# Patient Record
Sex: FEMALE | Race: WHITE | Employment: OTHER | URBAN - METROPOLITAN AREA
[De-identification: names, ages, dates, MRNs, and addresses within clinical notes are randomized per-mention and may not be internally consistent; named-entity substitution may affect disease eponyms.]

---

## 2017-01-23 ENCOUNTER — OFFICE VISIT (OUTPATIENT)
Dept: OTOLARYNGOLOGY | Facility: CLINIC | Age: 77
End: 2017-01-23

## 2017-01-23 VITALS
BODY MASS INDEX: 36.88 KG/M2 | SYSTOLIC BLOOD PRESSURE: 130 MMHG | TEMPERATURE: 98 F | WEIGHT: 235 LBS | HEIGHT: 67 IN | DIASTOLIC BLOOD PRESSURE: 80 MMHG

## 2017-01-23 DIAGNOSIS — K11.7 XEROSTOMIA: Primary | ICD-10-CM

## 2017-01-23 PROCEDURE — G0463 HOSPITAL OUTPT CLINIC VISIT: HCPCS | Performed by: OTOLARYNGOLOGY

## 2017-01-23 PROCEDURE — 99203 OFFICE O/P NEW LOW 30 MIN: CPT | Performed by: OTOLARYNGOLOGY

## 2017-01-23 RX ORDER — LEVOTHYROXINE SODIUM 88 UG/1
TABLET ORAL
Refills: 3 | COMMUNITY
Start: 2016-12-16 | End: 2017-04-03 | Stop reason: DRUGHIGH

## 2017-01-23 RX ORDER — HYDROCODONE BITARTRATE AND ACETAMINOPHEN 10; 325 MG/1; MG/1
TABLET ORAL
Refills: 0 | Status: ON HOLD | COMMUNITY
Start: 2017-01-17 | End: 2018-01-24

## 2017-01-24 NOTE — PROGRESS NOTES
Dawood Price is a 68year old female. Patient presents with:  Mouth/Lip Problem: c/o dry mouth for 3 months    HPI:   She's been experiencing problems with very dry mouth for the last several months.  She has no pain and reports no change in her medicat 0.0 oz/week       0 Standard drinks or equivalent per week       Comment: occ         REVIEW OF SYSTEMS:   GENERAL HEALTH: feels well otherwise  GENERAL : denies fever, chills, sweats, weight loss, weight gain  SKIN: denies any unusual skin lesions directly at this point.  - SJOGREN'S AB, ANTI-SS-A/-SS-B [7832] [Q]      The patient indicates understanding of these issues and agrees to the plan. Vitaly Sandhu MD  1/24/2017  8:15 AM

## 2017-02-03 ENCOUNTER — PRIOR ORIGINAL RECORDS (OUTPATIENT)
Dept: OTHER | Age: 77
End: 2017-02-03

## 2017-03-06 ENCOUNTER — PRIOR ORIGINAL RECORDS (OUTPATIENT)
Dept: OTHER | Age: 77
End: 2017-03-06

## 2017-03-06 ENCOUNTER — LAB ENCOUNTER (OUTPATIENT)
Dept: LAB | Age: 77
End: 2017-03-06
Attending: INTERNAL MEDICINE
Payer: MEDICARE

## 2017-03-06 DIAGNOSIS — F01.50 VASCULAR DEMENTIA WITH DEPRESSED MOOD (HCC): ICD-10-CM

## 2017-03-06 DIAGNOSIS — E83.52 HYPERCALCEMIA: ICD-10-CM

## 2017-03-06 DIAGNOSIS — E78.5 HYPERLIPEMIA: ICD-10-CM

## 2017-03-06 DIAGNOSIS — F32.A VASCULAR DEMENTIA WITH DEPRESSED MOOD (HCC): ICD-10-CM

## 2017-03-06 DIAGNOSIS — E78.00 PURE HYPERCHOLESTEROLEMIA: ICD-10-CM

## 2017-03-06 DIAGNOSIS — N18.30 CHRONIC KIDNEY DISEASE, STAGE III (MODERATE) (HCC): Primary | ICD-10-CM

## 2017-03-06 DIAGNOSIS — K11.7 DISTURBANCE OF SALIVARY SECRETION: ICD-10-CM

## 2017-03-06 LAB
ALBUMIN SERPL-MCNC: 3.7 G/DL (ref 3.5–4.8)
ALT SERPL-CCNC: 11 U/L (ref 14–54)
AST SERPL-CCNC: 13 U/L (ref 15–41)
BUN BLD-MCNC: 20 MG/DL (ref 8–20)
CALCIUM BLD-MCNC: 10.3 MG/DL (ref 8.3–10.3)
CHLORIDE: 109 MMOL/L (ref 101–111)
CHOLEST SMN-MCNC: 152 MG/DL (ref ?–200)
CO2: 27 MMOL/L (ref 22–32)
CREAT BLD-MCNC: 1.96 MG/DL (ref 0.55–1.02)
GLUCOSE BLD-MCNC: 83 MG/DL (ref 70–99)
HDLC SERPL-MCNC: 77 MG/DL (ref 45–?)
HDLC SERPL: 1.97 {RATIO} (ref ?–4.44)
LDLC SERPL CALC-MCNC: 52 MG/DL (ref ?–130)
NONHDLC SERPL-MCNC: 75 MG/DL (ref ?–130)
PHOSPHATE SERPL-MCNC: 3.6 MG/DL (ref 2.5–4.9)
POTASSIUM SERPL-SCNC: 5 MMOL/L (ref 3.6–5.1)
SODIUM SERPL-SCNC: 143 MMOL/L (ref 136–144)
TRIGLYCERIDES: 117 MG/DL (ref ?–150)
VLDL: 23 MG/DL (ref 5–40)

## 2017-03-06 PROCEDURE — 84450 TRANSFERASE (AST) (SGOT): CPT

## 2017-03-06 PROCEDURE — 86235 NUCLEAR ANTIGEN ANTIBODY: CPT

## 2017-03-06 PROCEDURE — 80069 RENAL FUNCTION PANEL: CPT

## 2017-03-06 PROCEDURE — 80061 LIPID PANEL: CPT

## 2017-03-06 PROCEDURE — 84460 ALANINE AMINO (ALT) (SGPT): CPT

## 2017-03-07 LAB
SSA AUTOAB: 216 AU/ML (ref ?–100)
SSB AUTOAB: <100 AU/ML (ref ?–100)

## 2017-04-11 ENCOUNTER — OFFICE VISIT (OUTPATIENT)
Dept: OTOLARYNGOLOGY | Facility: CLINIC | Age: 77
End: 2017-04-11

## 2017-04-11 VITALS — DIASTOLIC BLOOD PRESSURE: 78 MMHG | HEART RATE: 72 BPM | TEMPERATURE: 99 F | SYSTOLIC BLOOD PRESSURE: 138 MMHG

## 2017-04-11 DIAGNOSIS — K11.7 XEROSTOMIA: Primary | ICD-10-CM

## 2017-04-11 PROCEDURE — 99213 OFFICE O/P EST LOW 20 MIN: CPT | Performed by: OTOLARYNGOLOGY

## 2017-04-11 PROCEDURE — G0463 HOSPITAL OUTPT CLINIC VISIT: HCPCS | Performed by: OTOLARYNGOLOGY

## 2017-04-13 NOTE — PROGRESS NOTES
Theodore Cain is a 68year old female. Patient presents with:  Mouth/Lip Problem: f/u dry mouth,  per pt had labs completed at [address]     HPI:   She continues to have a very dry .  She had some serologic testing performed which demonstrates a pos 1/2013   • UTI (lower urinary tract infection)    • CKD (chronic kidney disease), stage 4 (severe) ([facility])      sees nephrology      Social History:    Smoking Status: Never Smoker                      Alcohol Use: Yes           0.0 oz/week       0 Standard indicates understanding of these issues and agrees to the plan. Vitaly Sanchez MD  4/13/2017  5:43 AM

## 2017-04-25 ENCOUNTER — LAB ENCOUNTER (OUTPATIENT)
Dept: LAB | Facility: HOSPITAL | Age: 77
End: 2017-04-25
Attending: Other
Payer: MEDICARE

## 2017-04-25 DIAGNOSIS — Z51.81 ENCOUNTER FOR THERAPEUTIC DRUG MONITORING: Primary | ICD-10-CM

## 2017-04-25 DIAGNOSIS — R79.89 DECREASED THYROID STIMULATING HORMONE (TSH) LEVEL: ICD-10-CM

## 2017-04-25 PROCEDURE — 84439 ASSAY OF FREE THYROXINE: CPT

## 2017-04-25 PROCEDURE — 80178 ASSAY OF LITHIUM: CPT

## 2017-04-25 PROCEDURE — 84443 ASSAY THYROID STIM HORMONE: CPT

## 2017-04-25 PROCEDURE — 84481 FREE ASSAY (FT-3): CPT

## 2017-05-12 LAB
CHOLESTEROL, TOTAL: 152 MG/DL
HDL CHOLESTEROL: 77 MG/DL
LDL CHOLESTEROL: 52 MG/DL
NON-HDL CHOLESTEROL: 75 MG/DL
TRIGLYCERIDES: 117 MG/DL

## 2017-06-04 ENCOUNTER — HOSPITAL (OUTPATIENT)
Dept: OTHER | Age: 77
End: 2017-06-04
Attending: EMERGENCY MEDICINE

## 2017-06-08 ENCOUNTER — LAB ENCOUNTER (OUTPATIENT)
Dept: LAB | Age: 77
End: 2017-06-08
Attending: INTERNAL MEDICINE
Payer: MEDICARE

## 2017-06-08 DIAGNOSIS — N18.30 CHRONIC KIDNEY DISEASE, STAGE III (MODERATE) (HCC): Primary | ICD-10-CM

## 2017-06-08 DIAGNOSIS — E78.5 HYPERLIPEMIA: ICD-10-CM

## 2017-06-08 DIAGNOSIS — E83.52 HYPERCALCEMIA: ICD-10-CM

## 2017-06-08 PROCEDURE — 80069 RENAL FUNCTION PANEL: CPT

## 2017-06-08 PROCEDURE — 85014 HEMATOCRIT: CPT

## 2017-06-08 PROCEDURE — 83970 ASSAY OF PARATHORMONE: CPT

## 2017-06-08 PROCEDURE — 85018 HEMOGLOBIN: CPT

## 2017-06-08 PROCEDURE — 84550 ASSAY OF BLOOD/URIC ACID: CPT

## 2017-06-13 ENCOUNTER — LAB ENCOUNTER (OUTPATIENT)
Dept: LAB | Age: 77
End: 2017-06-13
Attending: Other
Payer: MEDICARE

## 2017-06-13 DIAGNOSIS — Z51.81 ENCOUNTER FOR THERAPEUTIC DRUG MONITORING: Primary | ICD-10-CM

## 2017-06-13 PROCEDURE — 80178 ASSAY OF LITHIUM: CPT

## 2017-06-13 PROCEDURE — 84520 ASSAY OF UREA NITROGEN: CPT

## 2017-06-13 PROCEDURE — 82565 ASSAY OF CREATININE: CPT

## 2017-11-09 ENCOUNTER — OFFICE VISIT (OUTPATIENT)
Dept: OTOLARYNGOLOGY | Facility: CLINIC | Age: 77
End: 2017-11-09

## 2017-11-09 VITALS
BODY MASS INDEX: 36.1 KG/M2 | WEIGHT: 230 LBS | HEIGHT: 67 IN | DIASTOLIC BLOOD PRESSURE: 66 MMHG | TEMPERATURE: 99 F | SYSTOLIC BLOOD PRESSURE: 148 MMHG

## 2017-11-09 DIAGNOSIS — K11.7 XEROSTOMIA: Primary | ICD-10-CM

## 2017-11-09 PROCEDURE — 99213 OFFICE O/P EST LOW 20 MIN: CPT | Performed by: OTOLARYNGOLOGY

## 2017-11-09 PROCEDURE — G0463 HOSPITAL OUTPT CLINIC VISIT: HCPCS | Performed by: OTOLARYNGOLOGY

## 2017-11-09 RX ORDER — LITHIUM CARBONATE 300 MG/1
TABLET, FILM COATED, EXTENDED RELEASE ORAL
Status: ON HOLD | COMMUNITY
Start: 2017-10-19 | End: 2018-01-24

## 2017-11-10 NOTE — PROGRESS NOTES
Vicky Valderrama is a 68year old female. Patient presents with:  Mouth/Lip Problem: pt states she is producing large amounts of saliva for a few weeks     HPI:   She feels thick slime in her mouth all the time for the last few weeks. No pain.      Current bipolar   • OTHER DISEASES     renal insufficiency   • TIA (transient ischemic attack) 1/2013   • UTI (lower urinary tract infection)       Social History:  Smoking status: Never Smoker                                                              Smokeless her mouth and making her uncomfortable. We discussed things she can do to help. RTC prn      The patient indicates understanding of these issues and agrees to the plan. Vitaly Hooper MD  11/9/2017  8:12 PM

## 2017-11-29 ENCOUNTER — LAB ENCOUNTER (OUTPATIENT)
Dept: LAB | Age: 77
End: 2017-11-29
Attending: INTERNAL MEDICINE
Payer: MEDICARE

## 2017-11-29 DIAGNOSIS — F31.70 MIXED BIPOLAR I DISORDER IN REMISSION (HCC): ICD-10-CM

## 2017-11-29 DIAGNOSIS — D63.1 ANEMIA IN CHRONIC KIDNEY DISEASE: ICD-10-CM

## 2017-11-29 DIAGNOSIS — N18.9 ANEMIA IN CHRONIC KIDNEY DISEASE: ICD-10-CM

## 2017-11-29 DIAGNOSIS — N25.81 SECONDARY HYPERPARATHYROIDISM OF RENAL ORIGIN (HCC): ICD-10-CM

## 2017-11-29 DIAGNOSIS — Z51.81 ENCOUNTER FOR THERAPEUTIC DRUG MONITORING: ICD-10-CM

## 2017-11-29 DIAGNOSIS — N18.4 CHRONIC KIDNEY DISEASE, STAGE IV (SEVERE) (HCC): Primary | ICD-10-CM

## 2017-11-29 DIAGNOSIS — E83.52 HYPERCALCEMIA: ICD-10-CM

## 2017-11-29 DIAGNOSIS — F32.9 MAJOR DEPRESSIVE DISORDER WITH SINGLE EPISODE: ICD-10-CM

## 2017-11-29 DIAGNOSIS — I10 ESSENTIAL HYPERTENSION: ICD-10-CM

## 2017-11-29 PROCEDURE — 80335 ANTIDEPRESSANT TRICYCLIC 1/2: CPT

## 2017-11-29 PROCEDURE — 85014 HEMATOCRIT: CPT

## 2017-11-29 PROCEDURE — 83970 ASSAY OF PARATHORMONE: CPT

## 2017-11-29 PROCEDURE — 85018 HEMOGLOBIN: CPT

## 2017-11-29 PROCEDURE — 80069 RENAL FUNCTION PANEL: CPT

## 2017-12-29 PROBLEM — M17.11 PRIMARY OSTEOARTHRITIS OF RIGHT KNEE: Status: ACTIVE | Noted: 2017-12-29

## 2018-01-08 PROBLEM — M25.561 CHRONIC PAIN OF RIGHT KNEE: Status: ACTIVE | Noted: 2018-01-08

## 2018-01-08 PROBLEM — G89.29 CHRONIC PAIN OF RIGHT KNEE: Status: ACTIVE | Noted: 2018-01-08

## 2018-01-21 ENCOUNTER — HOSPITAL (OUTPATIENT)
Dept: OTHER | Age: 78
End: 2018-01-21
Attending: EMERGENCY MEDICINE

## 2018-01-21 LAB
ANALYZER ANC (IANC): ABNORMAL
ANION GAP SERPL CALC-SCNC: 10 MMOL/L (ref 10–20)
BASOPHILS # BLD: 0 THOUSAND/MCL (ref 0–0.3)
BASOPHILS NFR BLD: 0 %
BUN SERPL-MCNC: 20 MG/DL (ref 6–20)
BUN/CREAT SERPL: 11 (ref 7–25)
CALCIUM SERPL-MCNC: 9.2 MG/DL (ref 8.4–10.2)
CHLORIDE: 112 MMOL/L (ref 98–107)
CO2 SERPL-SCNC: 24 MMOL/L (ref 21–32)
CREAT SERPL-MCNC: 1.84 MG/DL (ref 0.51–0.95)
DIFFERENTIAL METHOD BLD: ABNORMAL
EOSINOPHIL # BLD: 0.6 THOUSAND/MCL (ref 0.1–0.5)
EOSINOPHIL NFR BLD: 9 %
ERYTHROCYTE [DISTWIDTH] IN BLOOD: 13 % (ref 11–15)
GLUCOSE SERPL-MCNC: 98 MG/DL (ref 65–99)
HEMATOCRIT: 34.6 % (ref 36–46.5)
HGB BLD-MCNC: 10.5 GM/DL (ref 12–15.5)
LITHIUM SERPL-SCNC: 0.8 MMOL/L (ref 0.6–1.2)
LYMPHOCYTES # BLD: 1.5 THOUSAND/MCL (ref 1–4)
LYMPHOCYTES NFR BLD: 23 %
MCH RBC QN AUTO: 31.3 PG (ref 26–34)
MCHC RBC AUTO-ENTMCNC: 30.3 GM/DL (ref 32–36.5)
MCV RBC AUTO: 103.3 FL (ref 78–100)
MONOCYTES # BLD: 0.4 THOUSAND/MCL (ref 0.3–0.9)
MONOCYTES NFR BLD: 6 %
NEUTROPHILS # BLD: 4 THOUSAND/MCL (ref 1.8–7.7)
NEUTROPHILS NFR BLD: 62 %
NEUTS SEG NFR BLD: ABNORMAL %
PERCENT NRBC: ABNORMAL
PLATELET # BLD: 211 THOUSAND/MCL (ref 140–450)
POTASSIUM SERPL-SCNC: 4.7 MMOL/L (ref 3.4–5.1)
RBC # BLD: 3.35 MILLION/MCL (ref 4–5.2)
SODIUM SERPL-SCNC: 141 MMOL/L (ref 135–145)
WBC # BLD: 6.5 THOUSAND/MCL (ref 4.2–11)

## 2018-01-22 PROBLEM — Z79.891 CHRONIC PRESCRIPTION OPIATE USE: Status: ACTIVE | Noted: 2018-01-22

## 2018-01-23 ENCOUNTER — OFFICE VISIT (OUTPATIENT)
Dept: OTOLARYNGOLOGY | Facility: CLINIC | Age: 78
End: 2018-01-23

## 2018-01-23 VITALS — DIASTOLIC BLOOD PRESSURE: 90 MMHG | SYSTOLIC BLOOD PRESSURE: 140 MMHG | TEMPERATURE: 99 F

## 2018-01-23 DIAGNOSIS — K11.7 XEROSTOMIA: Primary | ICD-10-CM

## 2018-01-23 PROCEDURE — G0463 HOSPITAL OUTPT CLINIC VISIT: HCPCS | Performed by: OTOLARYNGOLOGY

## 2018-01-23 PROCEDURE — 99213 OFFICE O/P EST LOW 20 MIN: CPT | Performed by: OTOLARYNGOLOGY

## 2018-01-23 RX ORDER — CEVIMELINE HYDROCHLORIDE 30 MG/1
30 CAPSULE ORAL 3 TIMES DAILY
Qty: 60 CAPSULE | Refills: 1 | Status: SHIPPED | OUTPATIENT
Start: 2018-01-23 | End: 2018-02-28

## 2018-01-23 NOTE — PROGRESS NOTES
Alison Santillan is a 68year old female. Patient presents with: Follow - Up: 2 month follow up- xerostomia- per pt no changes/improvement of ysmptoms    HPI:   She continues to have problems with thick saliva and accretions all the time.   She feels that • B12 deficiency 2013   • CKD (chronic kidney disease), stage 4 (severe)     sees nephrology   • HOSPITALIZATIONS 2006    lithium toxicity   • HOSPITALIZATIONS 2008    cardiac cath, stenting of OM   • HYPERLIPIDEMIA    • HYPERTENSION    • HYPOTHYROIDISM ASSESSMENT AND PLAN:   1. Xerostomia  No clinical abnormalities noted on examination. I do believe that her problem is still likely secondary to thick secretions and constant clearing of her throat.   I recommended that she increase her fluid intake ev

## 2018-01-24 ENCOUNTER — APPOINTMENT (OUTPATIENT)
Dept: GENERAL RADIOLOGY | Facility: HOSPITAL | Age: 78
DRG: 194 | End: 2018-01-24
Attending: EMERGENCY MEDICINE
Payer: MEDICARE

## 2018-01-24 ENCOUNTER — HOSPITAL ENCOUNTER (INPATIENT)
Facility: HOSPITAL | Age: 78
LOS: 5 days | Discharge: NURSING, REHAB OR LONG-TERM CARE | DRG: 194 | End: 2018-01-29
Attending: EMERGENCY MEDICINE | Admitting: HOSPITALIST
Payer: MEDICARE

## 2018-01-24 DIAGNOSIS — K92.2 GASTROINTESTINAL HEMORRHAGE, UNSPECIFIED GASTROINTESTINAL HEMORRHAGE TYPE: ICD-10-CM

## 2018-01-24 DIAGNOSIS — J18.9 PNEUMONIA OF RIGHT LUNG DUE TO INFECTIOUS ORGANISM, UNSPECIFIED PART OF LUNG: Primary | ICD-10-CM

## 2018-01-24 LAB
ADENOVIRUS PCR:: NEGATIVE
ANION GAP SERPL CALC-SCNC: 6 MMOL/L (ref 0–18)
B PERT DNA SPEC QL NAA+PROBE: NEGATIVE
BASOPHILS # BLD: 0 K/UL (ref 0–0.2)
BASOPHILS NFR BLD: 1 %
BILIRUB UR QL: NEGATIVE
BUN SERPL-MCNC: 39 MG/DL (ref 8–20)
BUN/CREAT SERPL: 18.1 (ref 10–20)
C PNEUM DNA SPEC QL NAA+PROBE: NEGATIVE
CALCIUM SERPL-MCNC: 9.4 MG/DL (ref 8.5–10.5)
CHLORIDE SERPL-SCNC: 113 MMOL/L (ref 95–110)
CLARITY UR: CLEAR
CO2 SERPL-SCNC: 22 MMOL/L (ref 22–32)
COLOR UR: YELLOW
CORONAVIRUS 229E PCR:: NEGATIVE
CORONAVIRUS HKU1 PCR:: NEGATIVE
CORONAVIRUS NL63 PCR:: NEGATIVE
CORONAVIRUS OC43 PCR:: NEGATIVE
CREAT SERPL-MCNC: 2.16 MG/DL (ref 0.5–1.5)
EOSINOPHIL # BLD: 0.5 K/UL (ref 0–0.7)
EOSINOPHIL NFR BLD: 8 %
ERYTHROCYTE [DISTWIDTH] IN BLOOD BY AUTOMATED COUNT: 13.8 % (ref 11–15)
FLUAV RNA SPEC QL NAA+PROBE: NEGATIVE
FLUBV RNA SPEC QL NAA+PROBE: NEGATIVE
GLUCOSE SERPL-MCNC: 111 MG/DL (ref 70–99)
GLUCOSE UR-MCNC: NEGATIVE MG/DL
HCT VFR BLD AUTO: 25.9 % (ref 35–48)
HCT VFR BLD AUTO: 28.5 % (ref 35–48)
HGB BLD-MCNC: 8.4 G/DL (ref 12–16)
HGB BLD-MCNC: 9.2 G/DL (ref 12–16)
HGB UR QL STRIP.AUTO: NEGATIVE
KETONES UR-MCNC: NEGATIVE MG/DL
LYMPHOCYTES # BLD: 2.1 K/UL (ref 1–4)
LYMPHOCYTES NFR BLD: 29 %
MCH RBC QN AUTO: 32.1 PG (ref 27–32)
MCHC RBC AUTO-ENTMCNC: 32.3 G/DL (ref 32–37)
MCV RBC AUTO: 99.4 FL (ref 80–100)
METAPNEUMOVIRUS PCR:: NEGATIVE
MONOCYTES # BLD: 0.4 K/UL (ref 0–1)
MONOCYTES NFR BLD: 5 %
MYCOPLASMA PNEUMONIA PCR:: NEGATIVE
NEUTROPHILS # BLD AUTO: 4.3 K/UL (ref 1.8–7.7)
NEUTROPHILS NFR BLD: 58 %
NITRITE UR QL STRIP.AUTO: NEGATIVE
OSMOLALITY UR CALC.SUM OF ELEC: 302 MOSM/KG (ref 275–295)
PARAINFLUENZA 1 PCR:: NEGATIVE
PARAINFLUENZA 2 PCR:: NEGATIVE
PARAINFLUENZA 3 PCR:: NEGATIVE
PARAINFLUENZA 4 PCR:: NEGATIVE
PH UR: 5 [PH] (ref 5–8)
PLATELET # BLD AUTO: 235 K/UL (ref 140–400)
PMV BLD AUTO: 9.6 FL (ref 7.4–10.3)
POTASSIUM SERPL-SCNC: 4.4 MMOL/L (ref 3.3–5.1)
PROT UR-MCNC: NEGATIVE MG/DL
RBC # BLD AUTO: 2.87 M/UL (ref 3.7–5.4)
RBC #/AREA URNS AUTO: 1 /HPF
RHINOVIRUS/ENTERO PCR:: NEGATIVE
RSV RNA SPEC QL NAA+PROBE: NEGATIVE
SODIUM SERPL-SCNC: 141 MMOL/L (ref 136–144)
SP GR UR STRIP: 1.01 (ref 1–1.03)
UROBILINOGEN UR STRIP-ACNC: <2
VIT C UR-MCNC: NEGATIVE MG/DL
WBC # BLD AUTO: 7.4 K/UL (ref 4–11)
WBC #/AREA URNS AUTO: 4 /HPF

## 2018-01-24 PROCEDURE — 71045 X-RAY EXAM CHEST 1 VIEW: CPT | Performed by: EMERGENCY MEDICINE

## 2018-01-24 PROCEDURE — 99222 1ST HOSP IP/OBS MODERATE 55: CPT | Performed by: INTERNAL MEDICINE

## 2018-01-24 RX ORDER — SODIUM CHLORIDE 9 MG/ML
INJECTION, SOLUTION INTRAVENOUS CONTINUOUS
Status: DISCONTINUED | OUTPATIENT
Start: 2018-01-24 | End: 2018-01-25

## 2018-01-24 RX ORDER — MOMETASONE 50 UG/1
1 SPRAY, METERED NASAL
Status: DISCONTINUED | OUTPATIENT
Start: 2018-01-24 | End: 2018-01-29

## 2018-01-24 RX ORDER — SODIUM CHLORIDE 0.9 % (FLUSH) 0.9 %
3 SYRINGE (ML) INJECTION AS NEEDED
Status: DISCONTINUED | OUTPATIENT
Start: 2018-01-24 | End: 2018-01-29

## 2018-01-24 RX ORDER — LITHIUM CARBONATE 300 MG/1
300 TABLET, FILM COATED, EXTENDED RELEASE ORAL DAILY
Status: DISCONTINUED | OUTPATIENT
Start: 2018-01-25 | End: 2018-01-29

## 2018-01-24 RX ORDER — AMITRIPTYLINE HYDROCHLORIDE 50 MG/1
50 TABLET, FILM COATED ORAL NIGHTLY
Status: DISCONTINUED | OUTPATIENT
Start: 2018-01-24 | End: 2018-01-29

## 2018-01-24 RX ORDER — LEVOTHYROXINE SODIUM 0.07 MG/1
75 TABLET ORAL
Status: DISCONTINUED | OUTPATIENT
Start: 2018-01-25 | End: 2018-01-29

## 2018-01-24 RX ORDER — ACETAMINOPHEN 325 MG/1
650 TABLET ORAL EVERY 6 HOURS PRN
Status: DISCONTINUED | OUTPATIENT
Start: 2018-01-24 | End: 2018-01-29

## 2018-01-24 RX ORDER — CETIRIZINE HYDROCHLORIDE 5 MG/1
5 TABLET ORAL DAILY
Status: DISCONTINUED | OUTPATIENT
Start: 2018-01-24 | End: 2018-01-29

## 2018-01-24 RX ORDER — CEVIMELINE HYDROCHLORIDE 30 MG/1
30 CAPSULE ORAL 3 TIMES DAILY
Status: DISCONTINUED | OUTPATIENT
Start: 2018-01-24 | End: 2018-01-26 | Stop reason: RX

## 2018-01-24 RX ORDER — ONDANSETRON 2 MG/ML
4 INJECTION INTRAMUSCULAR; INTRAVENOUS EVERY 6 HOURS PRN
Status: DISCONTINUED | OUTPATIENT
Start: 2018-01-24 | End: 2018-01-29

## 2018-01-24 RX ORDER — ATORVASTATIN CALCIUM 10 MG/1
10 TABLET, FILM COATED ORAL
Status: DISCONTINUED | OUTPATIENT
Start: 2018-01-24 | End: 2018-01-29

## 2018-01-24 RX ORDER — HYDROCODONE BITARTRATE AND ACETAMINOPHEN 10; 325 MG/1; MG/1
1 TABLET ORAL EVERY 4 HOURS PRN
Status: DISCONTINUED | OUTPATIENT
Start: 2018-01-24 | End: 2018-01-29

## 2018-01-24 RX ORDER — AZITHROMYCIN 250 MG/1
250 TABLET, FILM COATED ORAL DAILY
Status: COMPLETED | OUTPATIENT
Start: 2018-01-25 | End: 2018-01-28

## 2018-01-24 RX ORDER — ALPRAZOLAM 0.5 MG/1
0.5 TABLET ORAL NIGHTLY PRN
Status: DISCONTINUED | OUTPATIENT
Start: 2018-01-24 | End: 2018-01-29

## 2018-01-24 NOTE — ED NOTES
Pt to ER with brother with c/o bilateral leg weakness that started last night. Pt ambulates with walker at home. Pt denies fall or injury. Pt denies numbness or tingling to bilateral lower extremities. Pt is alert and oriented x4.  +bilateral pedal pulses p

## 2018-01-24 NOTE — ED INITIAL ASSESSMENT (HPI)
Pt brought in for increased confusion and weakness.  Pt's brother states she has hx of UTI's and has presented the same way in the past.

## 2018-01-24 NOTE — H&P
[facility] Hospitalist H&P       CC: Patient presents with:  Altered Mental Status (neurologic)       PCP: Alicia Richards MD    History of Present Illness: Patient is a 68year old female with PMH sig for Bipolar DO, HTN, HLD, Hypothyroidism, CKD stage 3, TIA, b1 Problem Relation Age of Onset   • Breast Cancer Mother 61   • Breast Cancer Maternal Aunt        Review of Systems  Comprehensive ROS reviewed and negative except for what's stated above.      OBJECTIVE:  /66   Pulse 66   Temp 98 °F (36.7 °C) (Tympa generalized weakness  - infiltrate noted on chest xray, + congestion, occasional cough, no fevers, normal WBC  - poss PNA, but hs vague  - started on cef/azithro will continue  - bc pending  - will check RVP  - PT/OT/SW eval  - gentle IVF    GI bleed  - re

## 2018-01-24 NOTE — ED PROVIDER NOTES
Patient Seen in: [facility] AND [facility] Emergency Department    History   Patient presents with:  Altered Mental Status (neurologic)    Stated Complaint:     HPI    51-year-old female with history of chronic kidney disease, hypertension, hyperlipidemia, hypot Smokeless tobacco: Never Used                      Alcohol use: Yes           0.0 oz/week     Comment: occ        Review of Systems    Positive for stated complaint:   Other systems are as noted in HPI.   Constitutional an Osmolality 302 (*)     GFR, Non- 22 (*)     GFR, -American 27 (*)     All other components within normal limits   CBC W/ DIFFERENTIAL - Abnormal; Notable for the following:     RBC 2.87 (*)     HGB 9.2 (*)     HCT 28.5 (*)     [facility] schedule follow up care with a primary care provider within the next three months to obtain basic health screening including reassessment of your blood pressure.     Medications Prescribed:  Current Discharge Medication List        Present on Admission  Eddy

## 2018-01-25 LAB
ALBUMIN SERPL BCP-MCNC: 3 G/DL (ref 3.5–4.8)
ALBUMIN/GLOB SERPL: 1.3 {RATIO} (ref 1–2)
ALP SERPL-CCNC: 66 U/L (ref 32–100)
ALT SERPL-CCNC: 9 U/L (ref 14–54)
ANION GAP SERPL CALC-SCNC: 7 MMOL/L (ref 0–18)
AST SERPL-CCNC: 17 U/L (ref 15–41)
BASOPHILS # BLD: 0 K/UL (ref 0–0.2)
BASOPHILS NFR BLD: 0 %
BILIRUB SERPL-MCNC: 0.5 MG/DL (ref 0.3–1.2)
BUN SERPL-MCNC: 27 MG/DL (ref 8–20)
BUN/CREAT SERPL: 14.5 (ref 10–20)
CALCIUM SERPL-MCNC: 9 MG/DL (ref 8.5–10.5)
CHLORIDE SERPL-SCNC: 115 MMOL/L (ref 95–110)
CO2 SERPL-SCNC: 20 MMOL/L (ref 22–32)
CREAT SERPL-MCNC: 1.86 MG/DL (ref 0.5–1.5)
EOSINOPHIL # BLD: 0.5 K/UL (ref 0–0.7)
EOSINOPHIL NFR BLD: 6 %
ERYTHROCYTE [DISTWIDTH] IN BLOOD BY AUTOMATED COUNT: 14.2 % (ref 11–15)
FERRITIN SERPL IA-MCNC: 24 NG/ML (ref 11–307)
FOLATE SERPL-MCNC: 10.2 NG/ML
GLOBULIN PLAS-MCNC: 2.3 G/DL (ref 2.5–3.7)
GLUCOSE SERPL-MCNC: 95 MG/DL (ref 70–99)
HCT VFR BLD AUTO: 27.1 % (ref 35–48)
HGB BLD-MCNC: 8.6 G/DL (ref 12–16)
IRON SATN MFR SERPL: 18 % (ref 15–50)
IRON SERPL-MCNC: 48 MCG/DL (ref 28–170)
LYMPHOCYTES # BLD: 2.3 K/UL (ref 1–4)
LYMPHOCYTES NFR BLD: 30 %
MAGNESIUM SERPL-MCNC: 2.3 MG/DL (ref 1.8–2.5)
MCH RBC QN AUTO: 32 PG (ref 27–32)
MCHC RBC AUTO-ENTMCNC: 31.6 G/DL (ref 32–37)
MCV RBC AUTO: 101.1 FL (ref 80–100)
MONOCYTES # BLD: 0.4 K/UL (ref 0–1)
MONOCYTES NFR BLD: 5 %
NEUTROPHILS # BLD AUTO: 4.5 K/UL (ref 1.8–7.7)
NEUTROPHILS NFR BLD: 58 %
OSMOLALITY UR CALC.SUM OF ELEC: 299 MOSM/KG (ref 275–295)
PLATELET # BLD AUTO: 197 K/UL (ref 140–400)
PMV BLD AUTO: 9.2 FL (ref 7.4–10.3)
POTASSIUM SERPL-SCNC: 4.2 MMOL/L (ref 3.3–5.1)
PROCALCITONIN SERPL-MCNC: 0.09 NG/ML (ref ?–0.11)
PROT SERPL-MCNC: 5.3 G/DL (ref 5.9–8.4)
RBC # BLD AUTO: 2.68 M/UL (ref 3.7–5.4)
SODIUM SERPL-SCNC: 142 MMOL/L (ref 136–144)
TIBC SERPL-MCNC: 271 MCG/DL (ref 228–428)
TRANSFERRIN SERPL-MCNC: 205 MG/DL (ref 192–382)
VIT B12 SERPL-MCNC: 131 PG/ML (ref 181–914)
WBC # BLD AUTO: 7.7 K/UL (ref 4–11)

## 2018-01-25 PROCEDURE — 99231 SBSQ HOSP IP/OBS SF/LOW 25: CPT | Performed by: INTERNAL MEDICINE

## 2018-01-25 RX ORDER — CYANOCOBALAMIN 1000 UG/ML
1000 INJECTION INTRAMUSCULAR; SUBCUTANEOUS
Status: DISCONTINUED | OUTPATIENT
Start: 2018-01-25 | End: 2018-01-29

## 2018-01-25 RX ORDER — MELATONIN
325
Status: DISCONTINUED | OUTPATIENT
Start: 2018-01-25 | End: 2018-01-29

## 2018-01-25 RX ORDER — DOCUSATE SODIUM 100 MG/1
100 CAPSULE, LIQUID FILLED ORAL 2 TIMES DAILY
Status: DISCONTINUED | OUTPATIENT
Start: 2018-01-25 | End: 2018-01-29

## 2018-01-25 RX ORDER — POLYETHYLENE GLYCOL 3350 17 G/17G
17 POWDER, FOR SOLUTION ORAL DAILY PRN
Status: DISCONTINUED | OUTPATIENT
Start: 2018-01-25 | End: 2018-01-29

## 2018-01-25 NOTE — PLAN OF CARE
Problem: Patient/Family Goals  Goal: Patient/Family Long Term Goal  Patient's Long Term Goal: Go home     Interventions:  - antibiotics  - administer medications as ordered  - monitor labs/vitals   - See additional Care Plan goals for specific intervention appropriate  Outcome: Progressing      Problem: SAFETY ADULT - FALL  Goal: Free from fall injury  INTERVENTIONS:  - Assess pt frequently for physical needs  - Identify cognitive and physical deficits and behaviors that affect risk of falls.   - [place] fa

## 2018-01-25 NOTE — PROGRESS NOTES
[facility] Hospitalist Progress Note     CC: Hospital Follow up    PCP: Malina Lane MD       Assessment/Plan:     Principal Problem:    Pneumonia of right lung due to infectious organism, unspecified part of lung  Active Problems:    Pneumonia of right lung due course    Questions/concerns were discussed with patient and/or family by bedside.       Thank Juancarlos Perrin MD    [address] [place] [address] Hospitalist     Subjective:     Feeling slight better, no fevers, breathing stable, no bleeding     OBJECTIVE:    Blood pressure 133/66, ALT  9*   AST  17   ALB  3.0*         Imaging:  Xr Chest Ap Portable  (cpt=71045)    Result Date: 1/24/2018  CONCLUSION: Right hilar and right lower lobe airspace opacities are suggestive of pneumonia and new since 8/31/14.  Short-term followup suggested

## 2018-01-25 NOTE — PROGRESS NOTES
[facility]     Gastroenterology Progress Note    Abbie Manzano Patient Status:  Inpatient    1940 MRN [id number]   Location [facility] 5SW/SE Attending German Kenney MD   Hosp Day # 1 PCP Alicia Richards MD       Subject Interpretation  -------------------------- Sinus Rhythm WITHIN NORMAL LIMITS When compared with ECG of 08/24/2015 11:24:15 No significant changes have occurred Electronically signed on 01/24/2018 at 15:43 by Nena Enriquez MD        Assessment and Plan:   Mar

## 2018-01-25 NOTE — CM/SW NOTE
SW met w/ pt and pt's brother/Kolby to discuss discharge planning. Pt lives at home alone in a senior independent apartment. Pt reported having a walker, rollator, wheelchair, and shower chair.  Pt reported that she is independent w/ ADL's, but has not bee

## 2018-01-25 NOTE — CONSULTS
[facility]   Gastroenterology Consultation Note     Tess Jha  Patient Status:    Inpatient  Date of Admission:         1/24/2018, Hospital day #0  Attending:   Velasquez Matamoros MD  PCP:     Acacia Snider MD    Reason for Consultation: Onset   • Breast Cancer Mother 61   • Breast Cancer Maternal Aunt       reports that she has never smoked. She has never used smokeless tobacco. She reports that she drinks alcohol. She reports that she does not use drugs.     Allergies:    Allegra-D non-distended  Ext: no lower extremity swelling  Neuro: Alert, Oriented X 3  Skin: no rashes, bruises  Psych: normal affect      Laboratory Data:    Lab Results  Component Value Date   WBC 7.4 01/24/2018   HGB 9.2 01/24/2018   HCT 28.5 01/24/2018    Gastroenterology  1/24/2018  6:31 PM

## 2018-01-25 NOTE — PHYSICAL THERAPY NOTE
PHYSICAL THERAPY EVALUATION - INPATIENT     Room Number: 554/554-A  Evaluation Date: 1/25/2018  Type of Evaluation: Initial  Physician Order: PT Eval and Treat    Presenting Problem:  (Pneumonia Rt lung)  Reason for Therapy: Mobility Dysfunction and Alphonso Crest tolerance. Pt understands well.     Patient's current functional deficits include Rt knee chronic pain, decrease RLE strength, anxiety and nervousness, shakyness at times, decreased generalized strength, decreased endurance/activity tolerance, impaired tato Problems:    Pneumonia of right lung due to infectious organism    Gastrointestinal hemorrhage, unspecified gastrointestinal hemorrhage type    Anemia      Past Medical History  Past Medical History:   Diagnosis Date   • B12 deficiency 2013   • CKD (chroni Status:  WFL - within functional limits    RANGE OF MOTION AND STRENGTH ASSESSMENT  Upper extremity ROM and strength are within functional limits     Lower extremity ROM is within functional limits except Rt knee limited for end range of motion    Lower ex chair;Needs met;Call light within reach;RN aware of session/findings; All patient questions and concerns addressed; Alarm set    CURRENT GOALS    Goals to be met by: 2/2/2018  Patient Goal Patient's self-stated goal is: to be able to walk   Goal #1 Patient i

## 2018-01-25 NOTE — OCCUPATIONAL THERAPY NOTE
OCCUPATIONAL THERAPY EVALUATION - INPATIENT     Room Number: 554/554-A  Evaluation Date: 1/25/2018  Type of Evaluation: Initial  Presenting Problem:  (PNA, GI bleed)    Physician Order: IP Consult to Occupational Therapy  Reason for Therapy: ADL/IADL Dysfu disease), stage 4 (severe)     sees nephrology   • HOSPITALIZATIONS 2006    lithium toxicity   • HOSPITALIZATIONS 2008    cardiac cath, stenting of OM   • HYPERLIPIDEMIA    • HYPERTENSION    • HYPOTHYROIDISM    • OSTEOPENIA    • OTHER DISEASES     bipolar session    RANGE OF MOTION   Upper extremity ROM is within functional limits    STRENGTH ASSESSMENT  Upper extremity strength is within functional limits       ACTIVITIES OF DAILY LIVING ASSESSMENT  AM-PAC ‘6-Clicks’ Inpatient Daily Activity Short Form  Ho

## 2018-01-26 LAB
ANION GAP SERPL CALC-SCNC: 6 MMOL/L (ref 0–18)
BASOPHILS # BLD: 0 K/UL (ref 0–0.2)
BASOPHILS NFR BLD: 0 %
BUN SERPL-MCNC: 16 MG/DL (ref 8–20)
BUN/CREAT SERPL: 8.8 (ref 10–20)
CALCIUM SERPL-MCNC: 9.1 MG/DL (ref 8.5–10.5)
CHLORIDE SERPL-SCNC: 118 MMOL/L (ref 95–110)
CO2 SERPL-SCNC: 21 MMOL/L (ref 22–32)
CREAT SERPL-MCNC: 1.81 MG/DL (ref 0.5–1.5)
EOSINOPHIL # BLD: 0.5 K/UL (ref 0–0.7)
EOSINOPHIL NFR BLD: 8 %
ERYTHROCYTE [DISTWIDTH] IN BLOOD BY AUTOMATED COUNT: 13.8 % (ref 11–15)
GLUCOSE SERPL-MCNC: 101 MG/DL (ref 70–99)
HCT VFR BLD AUTO: 24.9 % (ref 35–48)
HGB BLD-MCNC: 8 G/DL (ref 12–16)
LYMPHOCYTES # BLD: 2.3 K/UL (ref 1–4)
LYMPHOCYTES NFR BLD: 35 %
MAGNESIUM SERPL-MCNC: 2.3 MG/DL (ref 1.8–2.5)
MCH RBC QN AUTO: 32.3 PG (ref 27–32)
MCHC RBC AUTO-ENTMCNC: 32.1 G/DL (ref 32–37)
MCV RBC AUTO: 100.4 FL (ref 80–100)
MONOCYTES # BLD: 0.3 K/UL (ref 0–1)
MONOCYTES NFR BLD: 5 %
NEUTROPHILS # BLD AUTO: 3.3 K/UL (ref 1.8–7.7)
NEUTROPHILS NFR BLD: 52 %
OSMOLALITY UR CALC.SUM OF ELEC: 301 MOSM/KG (ref 275–295)
PLATELET # BLD AUTO: 189 K/UL (ref 140–400)
PMV BLD AUTO: 9 FL (ref 7.4–10.3)
POTASSIUM SERPL-SCNC: 4 MMOL/L (ref 3.3–5.1)
RBC # BLD AUTO: 2.48 M/UL (ref 3.7–5.4)
SODIUM SERPL-SCNC: 145 MMOL/L (ref 136–144)
WBC # BLD AUTO: 6.4 K/UL (ref 4–11)

## 2018-01-26 PROCEDURE — 90792 PSYCH DIAG EVAL W/MED SRVCS: CPT | Performed by: OTHER

## 2018-01-26 NOTE — PROGRESS NOTES
[facility] Hospitalist Progress Note     CC: Hospital Follow up    PCP: Cyril Donis MD       Assessment/Plan:     Principal Problem:    Pneumonia of right lung due to infectious organism, unspecified part of lung  Active Problems:    Pneumonia of right lung due course    Questions/concerns were discussed with patient and/or family by bedside.       Thank Fiorella Dorantes MD    [facility] Hospitalist     Subjective:     Feeling a lot better, no fevers, breathing stable, no bleeding     OBJECTIVE:    Blood pressure 117/63, CA  9.4  9.0  9.1   NA  141  142  145*   K  4.4  4.2  4.0   CL  113*  115*  118*   CO2  22  20*  21*       Recent Labs   Lab  01/25/18   0639   ALT  9*   AST  17   ALB  3.0*         Imaging:  Xr Chest Ap Portable  (cpt=71045)    Result Date: 1/24/2018  C

## 2018-01-26 NOTE — PLAN OF CARE
Problem: Patient/Family Goals  Goal: Patient/Family Long Term Goal  Patient's Long Term Goal: Go home     Interventions:  - antibiotics  - administer medications as ordered  - monitor labs/vitals   - See additional Care Plan goals for specific intervention Outcome: Progressing      Problem: SAFETY ADULT - FALL  Goal: Free from fall injury  INTERVENTIONS:  - Assess pt frequently for physical needs  - Identify cognitive and physical deficits and behaviors that affect risk of falls.   - [place] fall precauti

## 2018-01-26 NOTE — PLAN OF CARE
Problem: Patient/Family Goals  Goal: Patient/Family Long Term Goal  Patient's Long Term Goal: Go home     Interventions:  - antibiotics  - administer medications as ordered  - monitor labs/vitals   - See additional Care Plan goals for specific intervention interventions unsuccessful or patient reports new pain  - Anticipate increased pain with activity and pre-medicate as appropriate   Outcome: Progressing  Right leg pain. PRN Norco given as ordered with observed effectiveness.     Problem: SAFETY ADULT - FAL

## 2018-01-26 NOTE — CM/SW NOTE
SW was notified that pt has hx of Bipolar Disorder, which would require PAS screen from [facility] Dept. SW requested psych consult to initiate PAS screen.     Alondra Catalan, [address]

## 2018-01-26 NOTE — PLAN OF CARE
Problem: Patient/Family Goals  Goal: Patient/Family Long Term Goal  Patient's Long Term Goal: Go home     Interventions:  - antibiotics  - administer medications as ordered  - monitor labs/vitals   - See additional Care Plan goals for specific intervention Outcome: Progressing      Problem: SAFETY ADULT - FALL  Goal: Free from fall injury  INTERVENTIONS:  - Assess pt frequently for physical needs  - Identify cognitive and physical deficits and behaviors that affect risk of falls.   - [place] fall precauti

## 2018-01-26 NOTE — PHYSICAL THERAPY NOTE
PHYSICAL THERAPY TREATMENT NOTE - INPATIENT    Room Number: 554/554-A       Presenting Problem:  (Pneumonia Rt lung)    Problem List  Principal Problem:    Pneumonia of right lung due to infectious organism, unspecified part of lung  Active Problems:    P baseline and will continue to benefit from skilled PT while in hospital so pt can maximize functional potential and achieve higher level of function.   Recommend continuation of skilled PT in a rehab setting so pt can maximize functional potential and achie Standardized Score (AM-PAC Scale): 42.13   CMS Modifier (G-Code): CK    FUNCTIONAL ABILITY STATUS  Gait Assessment   Gait Assistance:  (Min Ax1 and SBAx1 since pt is anxious and for chair follow)  Distance (ft): 15ft, 20ft  Assistive Device: Rolling walk

## 2018-01-26 NOTE — OCCUPATIONAL THERAPY NOTE
OCCUPATIONAL THERAPY TREATMENT NOTE - INPATIENT     Room Number: 554/554-A          Presenting Problem: pneumonia    Problem List  Principal Problem:    Pneumonia of right lung due to infectious organism, unspecified part of lung  Active Problems:    Pneum meds)     ACTIVITY TOLERANCE  O2 Saturation: 100%  O2 Saturation with activity 94%  Room air    ACTIVITIES OF DAILY LIVING ASSESSMENT  AM-PAC ‘6-Clicks’ Inpatient Daily Activity Short Form  How much help from another person does the patient currently need…       Goals  on: 18  Frequency: 3-5x/week

## 2018-01-27 LAB
ANION GAP SERPL CALC-SCNC: 6 MMOL/L (ref 0–18)
BASOPHILS # BLD: 0 K/UL (ref 0–0.2)
BASOPHILS NFR BLD: 0 %
BUN SERPL-MCNC: 12 MG/DL (ref 8–20)
BUN/CREAT SERPL: 7.3 (ref 10–20)
CALCIUM SERPL-MCNC: 9.5 MG/DL (ref 8.5–10.5)
CHLORIDE SERPL-SCNC: 117 MMOL/L (ref 95–110)
CO2 SERPL-SCNC: 21 MMOL/L (ref 22–32)
CREAT SERPL-MCNC: 1.65 MG/DL (ref 0.5–1.5)
EOSINOPHIL # BLD: 0.6 K/UL (ref 0–0.7)
EOSINOPHIL NFR BLD: 9 %
ERYTHROCYTE [DISTWIDTH] IN BLOOD BY AUTOMATED COUNT: 14.3 % (ref 11–15)
GLUCOSE SERPL-MCNC: 97 MG/DL (ref 70–99)
HCT VFR BLD AUTO: 30.3 % (ref 35–48)
HGB BLD-MCNC: 9.6 G/DL (ref 12–16)
LYMPHOCYTES # BLD: 1.9 K/UL (ref 1–4)
LYMPHOCYTES NFR BLD: 30 %
MAGNESIUM SERPL-MCNC: 2.4 MG/DL (ref 1.8–2.5)
MCH RBC QN AUTO: 31.8 PG (ref 27–32)
MCHC RBC AUTO-ENTMCNC: 31.8 G/DL (ref 32–37)
MCV RBC AUTO: 100.2 FL (ref 80–100)
MONOCYTES # BLD: 0.4 K/UL (ref 0–1)
MONOCYTES NFR BLD: 6 %
NEUTROPHILS # BLD AUTO: 3.7 K/UL (ref 1.8–7.7)
NEUTROPHILS NFR BLD: 56 %
OSMOLALITY UR CALC.SUM OF ELEC: 298 MOSM/KG (ref 275–295)
PLATELET # BLD AUTO: 215 K/UL (ref 140–400)
PMV BLD AUTO: 9.1 FL (ref 7.4–10.3)
POTASSIUM SERPL-SCNC: 4.3 MMOL/L (ref 3.3–5.1)
RBC # BLD AUTO: 3.03 M/UL (ref 3.7–5.4)
SODIUM SERPL-SCNC: 144 MMOL/L (ref 136–144)
WBC # BLD AUTO: 6.6 K/UL (ref 4–11)

## 2018-01-27 NOTE — CM/SW NOTE
Psych consult in. MARBELLA faxed PAS packet to [facility]. Dr. Denise Ward told MARBELLA that pt is cleared to dc once PAS screen is completed. MARBELLA left voicemail for [facility] regarding fax.      [facility] phone:[phone]  Fax: [phone]    **PAS screeners have

## 2018-01-27 NOTE — PLAN OF CARE
Problem: Patient/Family Goals  Goal: Patient/Family Long Term Goal  Patient's Long Term Goal: Go home     Interventions:  - antibiotics  - administer medications as ordered  - monitor labs/vitals   - See additional Care Plan goals for specific intervention Outcome: Progressing      Problem: SAFETY ADULT - FALL  Goal: Free from fall injury  INTERVENTIONS:  - Assess pt frequently for physical needs  - Identify cognitive and physical deficits and behaviors that affect risk of falls.   - [place] fall precauti

## 2018-01-27 NOTE — PROGRESS NOTES
[facility] Hospitalist Progress Note     CC: Hospital Follow up    PCP: Bridget Mai MD       Assessment/Plan:     Principal Problem:    Pneumonia of right lung due to infectious organism, unspecified part of lung  Active Problems:    Pneumonia of right lung due PIV     Dispo: pending clinical course    Questions/concerns were discussed with patient and/or family by bedside.       Thank Michael Martin MD    [facility] Hospitalist     Subjective:     Feeling a lot better, no fevers, breathing stable, no bleeding     OBJEC 4.0  4.3   CL  115*  118*  117*   CO2  20*  21*  21*       Recent Labs   Lab  01/25/18   0639   ALT  9*   AST  17   ALB  3.0*         Imaging:          Meds:     • cyanocobalamin  1,000 mcg Intramuscular Q30 Days   • ferrous sulfate  325 mg Oral Daily wit

## 2018-01-27 NOTE — CONSULTS
[facility]    Report of Consultation    Nate Gaona Patient Status:  Inpatient    1940 MRN [id number]   Location [facility] 5SW/SE Attending Tyree Duneas MD   [facility] Day [facility] PCP Kushal Mendez MD     Date of Admission:   (chronic kidney disease), stage 4 (severe)      Comment: sees nephrology  2006: HOSPITALIZATIONS      Comment: lithium toxicity  2008: HOSPITALIZATIONS      Comment: cardiac cath, stenting of OM  No date: HYPERLIPIDEMIA  No date: HYPERTENSION  No date: HYP (ZYRTEC) 5 MG tab 5 mg 5 mg Oral Daily   ALPRAZolam (XANAX) tab 0.5 mg 0.5 mg Oral Nightly PRN   atorvastatin (LIPITOR) tab 10 mg 10 mg Oral Daily   HYDROcodone-acetaminophen (NORCO)  MG per tab 1 tablet 1 tablet Oral Q4H PRN   Levothyroxine Sodium ( 01/26/2018    (H) 01/26/2018   CO2 21 (L) 01/26/2018    (H) 01/26/2018   CA 9.1 01/26/2018   ALB 3.0 (L) 01/25/2018   ALKPHO 66 01/25/2018   TP 5.3 (L) 01/25/2018   AST 17 01/25/2018   ALT 9 (L) 01/25/2018   T4F 0.9 04/25/2017   TSH 1.728 11/2 Platelet      Procalcitonin      Hemoglobin & Hematocrit      Iron And Tibc      Ferritin      Transferrin      Vitamin T24      Folic Acid Serum (Folate)      Basic Metabolic Panel (8)      CBC With Differential With Platelet      Magnesium      Basic Met

## 2018-01-28 LAB
ANION GAP SERPL CALC-SCNC: 4 MMOL/L (ref 0–18)
BASOPHILS # BLD: 0 K/UL (ref 0–0.2)
BASOPHILS NFR BLD: 0 %
BUN SERPL-MCNC: 10 MG/DL (ref 8–20)
BUN/CREAT SERPL: 5.9 (ref 10–20)
CALCIUM SERPL-MCNC: 9.2 MG/DL (ref 8.5–10.5)
CHLORIDE SERPL-SCNC: 118 MMOL/L (ref 95–110)
CO2 SERPL-SCNC: 22 MMOL/L (ref 22–32)
CREAT SERPL-MCNC: 1.7 MG/DL (ref 0.5–1.5)
EOSINOPHIL # BLD: 0.6 K/UL (ref 0–0.7)
EOSINOPHIL NFR BLD: 8 %
ERYTHROCYTE [DISTWIDTH] IN BLOOD BY AUTOMATED COUNT: 14.3 % (ref 11–15)
GLUCOSE SERPL-MCNC: 93 MG/DL (ref 70–99)
HCT VFR BLD AUTO: 28.1 % (ref 35–48)
HGB BLD-MCNC: 8.9 G/DL (ref 12–16)
LYMPHOCYTES # BLD: 2.1 K/UL (ref 1–4)
LYMPHOCYTES NFR BLD: 31 %
MAGNESIUM SERPL-MCNC: 2.3 MG/DL (ref 1.8–2.5)
MCH RBC QN AUTO: 31.8 PG (ref 27–32)
MCHC RBC AUTO-ENTMCNC: 31.9 G/DL (ref 32–37)
MCV RBC AUTO: 99.9 FL (ref 80–100)
MONOCYTES # BLD: 0.4 K/UL (ref 0–1)
MONOCYTES NFR BLD: 6 %
NEUTROPHILS # BLD AUTO: 3.7 K/UL (ref 1.8–7.7)
NEUTROPHILS NFR BLD: 54 %
OSMOLALITY UR CALC.SUM OF ELEC: 297 MOSM/KG (ref 275–295)
PLATELET # BLD AUTO: 223 K/UL (ref 140–400)
PMV BLD AUTO: 8.6 FL (ref 7.4–10.3)
POTASSIUM SERPL-SCNC: 4.1 MMOL/L (ref 3.3–5.1)
RBC # BLD AUTO: 2.81 M/UL (ref 3.7–5.4)
SODIUM SERPL-SCNC: 144 MMOL/L (ref 136–144)
WBC # BLD AUTO: 6.8 K/UL (ref 4–11)

## 2018-01-28 RX ORDER — HEPARIN SODIUM 5000 [USP'U]/ML
5000 INJECTION, SOLUTION INTRAVENOUS; SUBCUTANEOUS EVERY 8 HOURS
Status: DISCONTINUED | OUTPATIENT
Start: 2018-01-28 | End: 2018-01-29

## 2018-01-28 NOTE — PROGRESS NOTES
[facility] Hospitalist Progress Note     CC: Hospital Follow up    PCP: Severiano Villegas MD       Assessment/Plan:     Principal Problem:    Pneumonia of right lung due to infectious organism, unspecified part of lung  Active Problems:    Pneumonia of right lung due nasonex as outaptient  - continue nasonex, add zyrtec  - RVP negative  - patient has LARGE anxiety component with this, gave reassurance and some stress reducing techniques      HTN  - continue home meds     Hypothyroidism  - continue home meds     CKD  - 01/28/18   0717   RBC  2.48*  3.03*  2.81*   HGB  8.0*  9.6*  8.9*   HCT  24.9*  30.3*  28.1*   MCV  100.4*  100.2*  99.9   MCH  32.3*  31.8  31.8   MCHC  32.1  31.8*  31.9*   RDW  13.8  14.3  14.3   WBC  6.4  6.6  6.8   PLT  189  215  223         Recent L

## 2018-01-28 NOTE — PLAN OF CARE
Problem: Patient/Family Goals  Goal: Patient/Family Long Term Goal  Patient's Long Term Goal: Go home     Interventions:  - antibiotics  - administer medications as ordered  - monitor labs/vitals   - See additional Care Plan goals for specific intervention Outcome: Progressing      Problem: SAFETY ADULT - FALL  Goal: Free from fall injury  INTERVENTIONS:  - Assess pt frequently for physical needs  - Identify cognitive and physical deficits and behaviors that affect risk of falls.   - [place] fall precauti

## 2018-01-29 VITALS
DIASTOLIC BLOOD PRESSURE: 57 MMHG | BODY MASS INDEX: 32.67 KG/M2 | RESPIRATION RATE: 18 BRPM | OXYGEN SATURATION: 98 % | TEMPERATURE: 98 F | WEIGHT: 208.19 LBS | SYSTOLIC BLOOD PRESSURE: 131 MMHG | HEIGHT: 67 IN | HEART RATE: 73 BPM

## 2018-01-29 LAB
ANION GAP SERPL CALC-SCNC: 4 MMOL/L (ref 0–18)
BASOPHILS # BLD: 0 K/UL (ref 0–0.2)
BASOPHILS NFR BLD: 0 %
BUN SERPL-MCNC: 13 MG/DL (ref 8–20)
BUN/CREAT SERPL: 6.7 (ref 10–20)
CALCIUM SERPL-MCNC: 9.3 MG/DL (ref 8.5–10.5)
CHLORIDE SERPL-SCNC: 116 MMOL/L (ref 95–110)
CO2 SERPL-SCNC: 24 MMOL/L (ref 22–32)
CREAT SERPL-MCNC: 1.93 MG/DL (ref 0.5–1.5)
EOSINOPHIL # BLD: 0.6 K/UL (ref 0–0.7)
EOSINOPHIL NFR BLD: 9 %
ERYTHROCYTE [DISTWIDTH] IN BLOOD BY AUTOMATED COUNT: 14.8 % (ref 11–15)
GLUCOSE SERPL-MCNC: 98 MG/DL (ref 70–99)
HCT VFR BLD AUTO: 29.3 % (ref 35–48)
HGB BLD-MCNC: 9.3 G/DL (ref 12–16)
LYMPHOCYTES # BLD: 2.3 K/UL (ref 1–4)
LYMPHOCYTES NFR BLD: 34 %
MAGNESIUM SERPL-MCNC: 2.4 MG/DL (ref 1.8–2.5)
MCH RBC QN AUTO: 32.2 PG (ref 27–32)
MCHC RBC AUTO-ENTMCNC: 31.9 G/DL (ref 32–37)
MCV RBC AUTO: 100.9 FL (ref 80–100)
MONOCYTES # BLD: 0.4 K/UL (ref 0–1)
MONOCYTES NFR BLD: 7 %
NEUTROPHILS # BLD AUTO: 3.4 K/UL (ref 1.8–7.7)
NEUTROPHILS NFR BLD: 50 %
OSMOLALITY UR CALC.SUM OF ELEC: 298 MOSM/KG (ref 275–295)
PLATELET # BLD AUTO: 225 K/UL (ref 140–400)
PMV BLD AUTO: 8.8 FL (ref 7.4–10.3)
POTASSIUM SERPL-SCNC: 4.3 MMOL/L (ref 3.3–5.1)
RBC # BLD AUTO: 2.9 M/UL (ref 3.7–5.4)
SODIUM SERPL-SCNC: 144 MMOL/L (ref 136–144)
WBC # BLD AUTO: 6.7 K/UL (ref 4–11)

## 2018-01-29 RX ORDER — ACETAMINOPHEN 325 MG/1
650 TABLET ORAL EVERY 6 HOURS PRN
Qty: 30 TABLET | Refills: 0 | Status: SHIPPED | OUTPATIENT
Start: 2018-01-29 | End: 2018-04-10 | Stop reason: ALTCHOICE

## 2018-01-29 RX ORDER — AMITRIPTYLINE HYDROCHLORIDE 50 MG/1
50 TABLET, FILM COATED ORAL NIGHTLY
Qty: 30 TABLET | Refills: 0 | Status: SHIPPED | OUTPATIENT
Start: 2018-01-29

## 2018-01-29 RX ORDER — PSEUDOEPHEDRINE HCL 30 MG
100 TABLET ORAL 2 TIMES DAILY PRN
Qty: 60 CAPSULE | Refills: 0 | Status: SHIPPED | OUTPATIENT
Start: 2018-01-29

## 2018-01-29 RX ORDER — CETIRIZINE HYDROCHLORIDE 5 MG/1
5 TABLET ORAL DAILY
Qty: 30 TABLET | Refills: 0 | Status: SHIPPED | OUTPATIENT
Start: 2018-01-30 | End: 2018-04-10 | Stop reason: ALTCHOICE

## 2018-01-29 RX ORDER — ALPRAZOLAM 0.5 MG/1
0.5 TABLET ORAL NIGHTLY PRN
Qty: 12 TABLET | Refills: 0 | Status: ON HOLD | OUTPATIENT
Start: 2018-01-29 | End: 2021-02-14

## 2018-01-29 RX ORDER — CHOLECALCIFEROL (VITAMIN D3) 25 MCG
1 TABLET,CHEWABLE ORAL DAILY
Qty: 30 CAPSULE | Refills: 0 | Status: SHIPPED | OUTPATIENT
Start: 2018-01-29 | End: 2018-04-10 | Stop reason: ALTCHOICE

## 2018-01-29 RX ORDER — LITHIUM CARBONATE 300 MG/1
300 TABLET, FILM COATED, EXTENDED RELEASE ORAL DAILY
Qty: 30 TABLET | Refills: 0 | Status: ON HOLD | OUTPATIENT
Start: 2018-01-30 | End: 2021-02-14

## 2018-01-29 RX ORDER — CEFUROXIME AXETIL 500 MG/1
500 TABLET ORAL DAILY
Qty: 2 TABLET | Refills: 0 | Status: SHIPPED | OUTPATIENT
Start: 2018-01-29 | End: 2018-01-31

## 2018-01-29 RX ORDER — MELATONIN
325
Qty: 30 TABLET | Refills: 0 | Status: SHIPPED | OUTPATIENT
Start: 2018-01-30 | End: 2018-04-10 | Stop reason: ALTCHOICE

## 2018-01-29 RX ORDER — HYDROCODONE BITARTRATE AND ACETAMINOPHEN 10; 325 MG/1; MG/1
TABLET ORAL
Qty: 12 TABLET | Refills: 0 | Status: ON HOLD | OUTPATIENT
Start: 2018-01-29 | End: 2021-02-14

## 2018-01-29 NOTE — OCCUPATIONAL THERAPY NOTE
OCCUPATIONAL THERAPY TREATMENT NOTE - INPATIENT     Room Number: 554/554-A          Presenting Problem: pneumonia    Problem List  Principal Problem:    Pneumonia of right lung due to infectious organism, unspecified part of lung  Active Problems:    Pneum training;Neuromuscluar reeducation; Compensatory technique education      SUBJECTIVE  \"I did my best yesterday \"     OBJECTIVE  Precautions:  (anxious )    WEIGHT BEARING RESTRICTION  Weight Bearing Restriction: None                PAIN ASSESSMENT  Rating in sitting - standing CGA     Patient will tolerate standing for 5-7 minutes in prep for adls with SBA  Comment: progressing, stood 3  w/o shakiness    Patient will complete item retrieval with CGA  Comment: NT               Goals  on: 18  Wendy Blunt

## 2018-01-29 NOTE — PHYSICAL THERAPY NOTE
PHYSICAL THERAPY TREATMENT NOTE - INPATIENT    Room Number: 554/554-A       Presenting Problem:  (Pneumonia Rt lung)    Problem List  Principal Problem:    Pneumonia of right lung due to infectious organism, unspecified part of lung  Active Problems:    P in hospital so pt can maximize functional potential and achieve higher level of function. Recommend continuation of skilled PT in a rehab setting so pt can maximize functional potential and achieve PLF.       DISCHARGE RECOMMENDATIONS  PT Discharge Recomme 43.63   CMS Modifier (G-Code): CK    FUNCTIONAL ABILITY STATUS  Gait Assessment   Gait Assistance:  (CGAx1)  Distance (ft): 15ft, 25ft  Assistive Device: Rolling walker  Pattern:  (Rt knock knee, smaller step length)  Stoop/Curb Assistance: Not tested

## 2018-01-29 NOTE — PROGRESS NOTES
[facility] Hospitalist Progress Note     CC: Hospital Follow up    PCP: Malina Lane MD       Assessment/Plan:   Patient is a 68year old female with PMH sig for Bipolar DO, HTN, HLD, Hypothyroidism, CKD stage 3, TIA, b12 def, right knee OA, who presents with gen techniques      HTN  - continue home meds     Hypothyroidism  - continue home meds     CKD  - cre 2.16 prior 1.9- 2.0  - cre improved stop fluids  - monitor closely     Bipolar DO  - continue home meds     FN:  - IVF: stop IVF  - Diet: adv diet     DVT Pro 31.9*   RDW  14.3  14.3  14.8   WBC  6.6  6.8  6.7   PLT  215  223  225         Recent Labs   Lab  01/27/18   0618  01/28/18   0717  01/29/18   0543   GLU  97  93  98   BUN  12  10  13   CREATSERUM  1.65*  1.70*  1.93*   GFRAA  37*  35*  30*   GFRNAA  30*

## 2018-01-29 NOTE — PLAN OF CARE
Problem: Patient/Family Goals  Goal: Patient/Family Long Term Goal  Patient's Long Term Goal: Go home     Interventions:  - antibiotics  - administer medications as ordered  - monitor labs/vitals   - See additional Care Plan goals for specific intervention

## 2018-01-29 NOTE — DISCHARGE SUMMARY
[facility] Hospitalist Discharge Summary   Patient ID:  Emily Fitzgerald  [id number]  78 year old  2/16/1940    Admit date: 1/24/2018  Discharge date: 1/29/2018  Primary Care Physician: Sapphire Velarde MD   Attending Physician: Coreen Khoury DO   Consults:   Fito Ray oral supplementations  - no evidence of acute GI bleeding  - GI consulted, recommended outpatient fu  - adv diet without difficulty     Acute blood loss anemia  - hgb 9.2, prior 11  - no evidence of JOSE, low normal iron  - B12 level low  - IM B12 ordered Miconazole Nitrate 2 % Powd  Bid to abdominal skin fold        CHANGE how you take these medications    ALPRAZolam 0.5 MG Tabs  Commonly known as:  XANAX  Take 1 tablet (0.5 mg total) by mouth nightly as needed.   What changed:  how to take this     HYDRO Caps  · Cefuroxime Axetil 500 MG Tabs  · Cetirizine HCl 5 MG Tabs  · docusate sodium 100 MG Caps  · HYDROcodone-acetaminophen  MG Tabs  · Lithium Carbonate  MG Tbcr         Important follow up:   Follow-up Information     Pravin Chand MD In 1

## 2018-01-29 NOTE — CM/SW NOTE
SW contacted [facility] Dept; PAS screen has been completed as desk review.     MARBELLA notified [facility]- [facility]; agreeable w/ 5p d/c time  RN agreeable w/ 5p d/c time  MARBELLA met w/ pt/brother; agreeable w/ d/c time and Vikash Boo 82 contacted [facility]

## 2018-02-05 ENCOUNTER — OFFICE VISIT (OUTPATIENT)
Dept: CARDIOLOGY CLINIC | Facility: HOSPITAL | Age: 78
End: 2018-02-05
Attending: INTERNAL MEDICINE
Payer: MEDICARE

## 2018-02-05 VITALS — DIASTOLIC BLOOD PRESSURE: 72 MMHG | OXYGEN SATURATION: 100 % | SYSTOLIC BLOOD PRESSURE: 153 MMHG | HEART RATE: 72 BPM

## 2018-02-05 DIAGNOSIS — J18.9 PNEUMONIA OF RIGHT LUNG DUE TO INFECTIOUS ORGANISM, UNSPECIFIED PART OF LUNG: ICD-10-CM

## 2018-02-05 DIAGNOSIS — N18.4 STAGE 4 CHRONIC KIDNEY DISEASE (HCC): ICD-10-CM

## 2018-02-05 DIAGNOSIS — I10 ESSENTIAL HYPERTENSION, BENIGN: ICD-10-CM

## 2018-02-05 DIAGNOSIS — J18.9 COMMUNITY ACQUIRED PNEUMONIA OF RIGHT LOWER LOBE OF LUNG: Primary | ICD-10-CM

## 2018-02-05 DIAGNOSIS — R60.0 BILATERAL LEG EDEMA: ICD-10-CM

## 2018-02-05 PROCEDURE — 99214 OFFICE O/P EST MOD 30 MIN: CPT | Performed by: NURSE PRACTITIONER

## 2018-02-05 PROCEDURE — 99212 OFFICE O/P EST SF 10 MIN: CPT | Performed by: NURSE PRACTITIONER

## 2018-02-05 NOTE — PATIENT INSTRUCTIONS
Use incentive spirometer 4 sets of 10 breaths daily and the accapella blowing device 4 sets of 10 breaths daily     Continue all your same medications    Call if having any dizziness, lightheadedness, heart racing, palpitations, chest pain, shortness of br

## 2018-02-05 NOTE — PROGRESS NOTES
[address] [facility] Patient Status:  Outpatient    1940 MRN [id number]   Location [facility] Cyril Donis MD         Rosana Daly is a 68year old female who presents to clinic for hospital fol 17 01/25/2018 06:39 AM   ALT 9 (L) 01/25/2018 06:39 AM   T4F 0.9 04/25/2017 12:00 PM   TSH 1.728 11/29/2017 03:50 PM   DDIMER 0.67 (H) 07/25/2017 04:46 PM   MG 2.4 01/29/2018 05:43 AM   PHOS 4.1 11/29/2017 04:00 PM   B12 131 (L) 01/25/2018 06:39 AM    Cbc Pneumonia resolving. Denies cough, sob, wheezing. Increased activity tolerance walking 100-200 feet. Denies any sign of bleeding. Wbc normal 2-2-18, hgb down to 7.9 from 9.3. Renal function is stable, creatinine 1.81.  No JVD, lungs clear, diminished JAYASHREE , www.healthyeating. nhlbi.nih.gov      Exercise daily as tolerated, with goal of doing moderate aerobic exercise like walking for about 30 minutes 5 days per week. Start by walking at a slow to moderate pace for 5-10 minutes 2-3 times a day.  Pace your ac

## 2018-02-12 ENCOUNTER — LAB REQUISITION (OUTPATIENT)
Dept: LAB | Facility: HOSPITAL | Age: 78
End: 2018-02-12
Payer: MEDICARE

## 2018-02-12 DIAGNOSIS — N39.0 URINARY TRACT INFECTION: ICD-10-CM

## 2018-02-12 DIAGNOSIS — J18.1 LOBAR PNEUMONIA (HCC): ICD-10-CM

## 2018-02-12 DIAGNOSIS — I12.9 HYPERTENSIVE CHRONIC KIDNEY DISEASE WITH STAGE 1 THROUGH STAGE 4 CHRONIC KIDNEY DISEASE, OR UNSPECIFIED CHRONIC KIDNEY DISEASE: ICD-10-CM

## 2018-02-12 LAB
ALBUMIN SERPL BCP-MCNC: 3.3 G/DL (ref 3.5–4.8)
ALBUMIN/GLOB SERPL: 1.3 {RATIO} (ref 1–2)
ALP SERPL-CCNC: 77 U/L (ref 32–100)
ALT SERPL-CCNC: 12 U/L (ref 14–54)
ANION GAP SERPL CALC-SCNC: 4 MMOL/L (ref 0–18)
AST SERPL-CCNC: 22 U/L (ref 15–41)
BASOPHILS # BLD: 0 K/UL (ref 0–0.2)
BASOPHILS NFR BLD: 0 %
BILIRUB SERPL-MCNC: 0.5 MG/DL (ref 0.3–1.2)
BUN SERPL-MCNC: 10 MG/DL (ref 8–20)
BUN/CREAT SERPL: 5.5 (ref 10–20)
CALCIUM SERPL-MCNC: 9.2 MG/DL (ref 8.5–10.5)
CHLORIDE SERPL-SCNC: 112 MMOL/L (ref 95–110)
CO2 SERPL-SCNC: 22 MMOL/L (ref 22–32)
CREAT SERPL-MCNC: 1.81 MG/DL (ref 0.5–1.5)
EOSINOPHIL # BLD: 0.5 K/UL (ref 0–0.7)
EOSINOPHIL NFR BLD: 9 %
ERYTHROCYTE [DISTWIDTH] IN BLOOD BY AUTOMATED COUNT: 14.4 % (ref 11–15)
GLOBULIN PLAS-MCNC: 2.6 G/DL (ref 2.5–3.7)
GLUCOSE SERPL-MCNC: 103 MG/DL (ref 70–99)
HCT VFR BLD AUTO: 28.5 % (ref 35–48)
HGB BLD-MCNC: 8.9 G/DL (ref 12–16)
LYMPHOCYTES # BLD: 1.5 K/UL (ref 1–4)
LYMPHOCYTES NFR BLD: 29 %
MCH RBC QN AUTO: 31.7 PG (ref 27–32)
MCHC RBC AUTO-ENTMCNC: 31.3 G/DL (ref 32–37)
MCV RBC AUTO: 101.2 FL (ref 80–100)
MONOCYTES # BLD: 0.3 K/UL (ref 0–1)
MONOCYTES NFR BLD: 6 %
NEUTROPHILS # BLD AUTO: 2.9 K/UL (ref 1.8–7.7)
NEUTROPHILS NFR BLD: 56 %
OSMOLALITY UR CALC.SUM OF ELEC: 285 MOSM/KG (ref 275–295)
PLATELET # BLD AUTO: 230 K/UL (ref 140–400)
PMV BLD AUTO: 9.4 FL (ref 7.4–10.3)
POTASSIUM SERPL-SCNC: 4.5 MMOL/L (ref 3.3–5.1)
PROT SERPL-MCNC: 5.9 G/DL (ref 5.9–8.4)
RBC # BLD AUTO: 2.81 M/UL (ref 3.7–5.4)
SODIUM SERPL-SCNC: 138 MMOL/L (ref 136–144)
WBC # BLD AUTO: 5.3 K/UL (ref 4–11)

## 2018-02-12 PROCEDURE — 80053 COMPREHEN METABOLIC PANEL: CPT | Performed by: FAMILY MEDICINE

## 2018-02-12 PROCEDURE — 85025 COMPLETE CBC W/AUTO DIFF WBC: CPT | Performed by: FAMILY MEDICINE

## 2018-02-12 NOTE — PROGRESS NOTES
Pt is anemic and B12 deficient. Cont taking iron daily and Vit B12 otc 1000mcg daily. Needs cbc, B12 and folate levels in 3mos.

## 2018-02-14 ENCOUNTER — HOSPITAL ENCOUNTER (OUTPATIENT)
Dept: GENERAL RADIOLOGY | Facility: HOSPITAL | Age: 78
Discharge: HOME | End: 2018-02-14
Attending: NURSE PRACTITIONER
Payer: MEDICARE

## 2018-02-14 DIAGNOSIS — J18.9 COMMUNITY ACQUIRED PNEUMONIA OF RIGHT LOWER LOBE OF LUNG: ICD-10-CM

## 2018-02-14 PROCEDURE — 71046 X-RAY EXAM CHEST 2 VIEWS: CPT | Performed by: NURSE PRACTITIONER

## 2018-02-20 PROBLEM — J18.9 PNEUMONIA OF RIGHT LUNG DUE TO INFECTIOUS ORGANISM: Status: RESOLVED | Noted: 2018-01-24 | Resolved: 2018-02-20

## 2018-03-08 ENCOUNTER — HOSPITAL ENCOUNTER (OUTPATIENT)
Dept: ULTRASOUND IMAGING | Facility: HOSPITAL | Age: 78
Discharge: HOME | End: 2018-03-08
Attending: ANESTHESIOLOGY
Payer: MEDICARE

## 2018-03-08 ENCOUNTER — HOSPITAL ENCOUNTER (OUTPATIENT)
Dept: GENERAL RADIOLOGY | Facility: HOSPITAL | Age: 78
Discharge: HOME | End: 2018-03-08
Attending: ANESTHESIOLOGY
Payer: MEDICARE

## 2018-03-08 DIAGNOSIS — I83.813 VARICOSE VEINS OF LOWER EXTREMITY WITH PAIN, BILATERAL: ICD-10-CM

## 2018-03-08 DIAGNOSIS — R52 PAIN: ICD-10-CM

## 2018-03-08 PROCEDURE — 73560 X-RAY EXAM OF KNEE 1 OR 2: CPT | Performed by: ANESTHESIOLOGY

## 2018-03-10 ENCOUNTER — PRIOR ORIGINAL RECORDS (OUTPATIENT)
Dept: OTHER | Age: 78
End: 2018-03-10

## 2018-03-10 DIAGNOSIS — I10 ESSENTIAL HYPERTENSION, MALIGNANT: ICD-10-CM

## 2018-03-10 DIAGNOSIS — E78.00 PURE HYPERCHOLESTEROLEMIA: ICD-10-CM

## 2018-03-10 DIAGNOSIS — R74.8 ELEVATED VITAMIN B12 LEVEL: Primary | ICD-10-CM

## 2018-03-10 LAB
ALANINE AMINOTRANSFERASE: 15 U/L (ref 14–54)
ASPARTATE AMINOTRANSFERASE: 16 U/L (ref 15–41)
BLOOD UREA NITROGEN: 14 MG/DL (ref 8–20)
CALCIUM: 9.1 MG/DL (ref 8.3–10.3)
CARBON DIOXIDE: 23.7 MMOL/L (ref 22–32)
CHLORIDE: 110 MMOL/L (ref 101–111)
CHOLESTEROL: 157 MG/DL (ref ?–200)
CREATININE: 1.91 MG/DL (ref 0.55–1.02)
DIRECT HDL: 62 MG/DL (ref 45–?)
GLUCOSE BLD-MCNC: 93 MG/DL (ref 70–99)
LDLC SERPL CALC-MCNC: 78 MG/DL (ref ?–130)
Lab: 2.5 (ref ?–5)
POTASSIUM: 5 MMOL/L (ref 3.6–5.1)
SODIUM: 142 MMOL/L (ref 136–144)
TRIGLYCERIDES: 85 MG/DL (ref ?–150)

## 2018-03-14 ENCOUNTER — TELEPHONE (OUTPATIENT)
Dept: OTOLARYNGOLOGY | Facility: CLINIC | Age: 78
End: 2018-03-14

## 2018-03-14 NOTE — TELEPHONE ENCOUNTER
RX Cevimeline HCL 30 mg capsule    Prior Authorization received from [employer]    Please specify why the covered formulary alternative is inappropriate for your patient: Pilocarpine Tablet    Please fax [phone] or call [phone] to advise

## 2018-03-15 ENCOUNTER — PRIOR ORIGINAL RECORDS (OUTPATIENT)
Dept: OTHER | Age: 78
End: 2018-03-15

## 2018-03-15 LAB
ALT (SGPT): 15 U/L
AST (SGOT): 16 U/L
CHOLESTEROL, TOTAL: 157 MG/DL
HDL CHOLESTEROL: 62 MG/DL
LDL CHOLESTEROL: 78 MG/DL
TRIGLYCERIDES: 85 MG/DL

## 2018-03-21 ENCOUNTER — HOSPITAL ENCOUNTER (OUTPATIENT)
Dept: ULTRASOUND IMAGING | Facility: HOSPITAL | Age: 78
Discharge: HOME | End: 2018-03-21
Attending: ANESTHESIOLOGY
Payer: MEDICARE

## 2018-03-21 DIAGNOSIS — I83.813 VARICOSE VEINS OF BOTH LOWER EXTREMITIES WITH PAIN: ICD-10-CM

## 2018-03-21 DIAGNOSIS — I83.93 VARICOSE VEINS OF BOTH LOWER EXTREMITIES: ICD-10-CM

## 2018-03-21 PROCEDURE — 93970 EXTREMITY STUDY: CPT | Performed by: ANESTHESIOLOGY

## 2018-03-22 ENCOUNTER — TELEPHONE (OUTPATIENT)
Dept: OTOLARYNGOLOGY | Facility: CLINIC | Age: 78
End: 2018-03-22

## 2018-03-22 ENCOUNTER — LAB REQUISITION (OUTPATIENT)
Dept: LAB | Age: 78
End: 2018-03-22
Payer: MEDICARE

## 2018-03-22 DIAGNOSIS — N18.30 CHRONIC KIDNEY DISEASE, STAGE III (MODERATE) (HCC): ICD-10-CM

## 2018-03-22 DIAGNOSIS — I12.9 HYPERTENSIVE CHRONIC KIDNEY DISEASE WITH STAGE 1 THROUGH STAGE 4 CHRONIC KIDNEY DISEASE, OR UNSPECIFIED CHRONIC KIDNEY DISEASE: ICD-10-CM

## 2018-03-22 LAB
BASOPHILS # BLD AUTO: 0.03 X10(3) UL (ref 0–0.1)
BASOPHILS NFR BLD AUTO: 0.4 %
DEPRECATED HBV CORE AB SER IA-ACNC: 42 NG/ML (ref 10–291)
EOSINOPHIL # BLD AUTO: 0.61 X10(3) UL (ref 0–0.3)
EOSINOPHIL NFR BLD AUTO: 8.7 %
ERYTHROCYTE [DISTWIDTH] IN BLOOD BY AUTOMATED COUNT: 12.1 % (ref 11.5–16)
FOLATE (FOLIC ACID), SERUM: 3.1 NG/ML (ref 8.7–24)
HAV AB SERPL IA-ACNC: >2000 PG/ML (ref 193–986)
HCT VFR BLD AUTO: 36.7 % (ref 34–50)
HGB BLD-MCNC: 11 G/DL (ref 12–16)
IMMATURE GRANULOCYTE COUNT: 0.01 X10(3) UL (ref 0–1)
IMMATURE GRANULOCYTE RATIO %: 0.1 %
LYMPHOCYTES # BLD AUTO: 2.03 X10(3) UL (ref 0.9–4)
LYMPHOCYTES NFR BLD AUTO: 28.8 %
MCH RBC QN AUTO: 30.8 PG (ref 27–33.2)
MCHC RBC AUTO-ENTMCNC: 30 G/DL (ref 31–37)
MCV RBC AUTO: 102.8 FL (ref 81–100)
MONOCYTES # BLD AUTO: 0.41 X10(3) UL (ref 0.1–1)
MONOCYTES NFR BLD AUTO: 5.8 %
NEUTROPHIL ABS PRELIM: 3.95 X10 (3) UL (ref 1.3–6.7)
NEUTROPHILS # BLD AUTO: 3.95 X10(3) UL (ref 1.3–6.7)
NEUTROPHILS NFR BLD AUTO: 56.2 %
PLATELET # BLD AUTO: 249 10(3)UL (ref 150–450)
RBC # BLD AUTO: 3.57 X10(6)UL (ref 3.8–5.1)
RED CELL DISTRIBUTION WIDTH-SD: 45.9 FL (ref 35.1–46.3)
WBC # BLD AUTO: 7 X10(3) UL (ref 4–13)

## 2018-03-22 PROCEDURE — 82607 VITAMIN B-12: CPT | Performed by: FAMILY MEDICINE

## 2018-03-22 PROCEDURE — 82746 ASSAY OF FOLIC ACID SERUM: CPT | Performed by: FAMILY MEDICINE

## 2018-03-22 PROCEDURE — 85025 COMPLETE CBC W/AUTO DIFF WBC: CPT | Performed by: FAMILY MEDICINE

## 2018-03-22 PROCEDURE — 82728 ASSAY OF FERRITIN: CPT | Performed by: FAMILY MEDICINE

## 2018-03-22 NOTE — TELEPHONE ENCOUNTER
, per pharmacy. Dose and strength  for pilocarpine for dry mouth  should be pilocarpine 5 mg  ( 1 tablet up to 4 times daily) . Ok send order? since cevimeline 30 mg is not covered, also please advise on refills.

## 2018-03-22 NOTE — TELEPHONE ENCOUNTER
I would recommend that we try only 1 tablet daily but she can work her way up to 4 daily over a month if it is not helping

## 2018-03-23 RX ORDER — PILOCARPINE HYDROCHLORIDE 5 MG/1
5 TABLET, FILM COATED ORAL 4 TIMES DAILY
Qty: 120 TABLET | Refills: 0 | Status: SHIPPED | OUTPATIENT
Start: 2018-03-23 | End: 2018-05-01

## 2018-03-23 NOTE — TELEPHONE ENCOUNTER
Rx for pilocarpine 5 mg, 1 tab 4 times daily sent to pharmacy with note to pharmacy per , recommend pt try 1 tablet daily but may work her way up to 4 tabs daily over one month if not helping.

## 2018-03-23 NOTE — PROGRESS NOTES
Hb better now. B12 too high. Can stop taking otc B12. Recheck B12 and folate in 3mos. Folic acid a little low. Will recheck in 3mos.

## 2018-03-28 ENCOUNTER — HOSPITAL ENCOUNTER (OUTPATIENT)
Dept: ULTRASOUND IMAGING | Facility: HOSPITAL | Age: 78
Discharge: HOME | End: 2018-03-28
Attending: ANESTHESIOLOGY
Payer: MEDICARE

## 2018-03-28 DIAGNOSIS — I73.9 PVD (PERIPHERAL VASCULAR DISEASE) (HCC): ICD-10-CM

## 2018-03-28 DIAGNOSIS — I77.9 ARTERIAL DISEASE (HCC): ICD-10-CM

## 2018-03-28 PROCEDURE — 93923 UPR/LXTR ART STDY 3+ LVLS: CPT | Performed by: ANESTHESIOLOGY

## 2018-04-10 PROCEDURE — 86235 NUCLEAR ANTIGEN ANTIBODY: CPT | Performed by: INTERNAL MEDICINE

## 2018-04-10 PROCEDURE — 86038 ANTINUCLEAR ANTIBODIES: CPT | Performed by: INTERNAL MEDICINE

## 2018-04-12 ENCOUNTER — HOSPITAL ENCOUNTER (OUTPATIENT)
Dept: GENERAL RADIOLOGY | Facility: HOSPITAL | Age: 78
Discharge: HOME | End: 2018-04-12
Attending: ANESTHESIOLOGY
Payer: MEDICARE

## 2018-04-12 DIAGNOSIS — R52 PAIN: ICD-10-CM

## 2018-04-12 PROCEDURE — 73560 X-RAY EXAM OF KNEE 1 OR 2: CPT | Performed by: ANESTHESIOLOGY

## 2018-04-20 ENCOUNTER — MYAURORA ACCOUNT LINK (OUTPATIENT)
Dept: OTHER | Age: 78
End: 2018-04-20

## 2018-04-23 ENCOUNTER — PRIOR ORIGINAL RECORDS (OUTPATIENT)
Dept: OTHER | Age: 78
End: 2018-04-23

## 2018-05-16 ENCOUNTER — LAB REQUISITION (OUTPATIENT)
Dept: LAB | Facility: HOSPITAL | Age: 78
End: 2018-05-16
Payer: MEDICARE

## 2018-05-16 DIAGNOSIS — D63.1 ANEMIA IN CHRONIC KIDNEY DISEASE (CODE): ICD-10-CM

## 2018-05-16 DIAGNOSIS — Z12.9 ENCOUNTER FOR SCREENING FOR MALIGNANT NEOPLASM: ICD-10-CM

## 2018-05-16 DIAGNOSIS — Z87.440 PERSONAL HISTORY OF URINARY INFECTION: ICD-10-CM

## 2018-05-16 PROCEDURE — 87088 URINE BACTERIA CULTURE: CPT | Performed by: FAMILY MEDICINE

## 2018-05-16 PROCEDURE — 81001 URINALYSIS AUTO W/SCOPE: CPT | Performed by: FAMILY MEDICINE

## 2018-05-16 PROCEDURE — 87186 SC STD MICRODIL/AGAR DIL: CPT | Performed by: FAMILY MEDICINE

## 2018-05-16 PROCEDURE — 87086 URINE CULTURE/COLONY COUNT: CPT | Performed by: FAMILY MEDICINE

## 2018-06-13 PROCEDURE — 82947 ASSAY GLUCOSE BLOOD QUANT: CPT | Performed by: INTERNAL MEDICINE

## 2018-06-13 PROCEDURE — 82607 VITAMIN B-12: CPT | Performed by: FAMILY MEDICINE

## 2018-06-13 PROCEDURE — 82746 ASSAY OF FOLIC ACID SERUM: CPT | Performed by: FAMILY MEDICINE

## 2018-06-13 PROCEDURE — 83970 ASSAY OF PARATHORMONE: CPT | Performed by: INTERNAL MEDICINE

## 2018-06-15 ENCOUNTER — HOSPITAL ENCOUNTER (EMERGENCY)
Facility: HOSPITAL | Age: 78
Discharge: HOME | End: 2018-06-15
Attending: EMERGENCY MEDICINE
Payer: MEDICARE

## 2018-06-15 ENCOUNTER — APPOINTMENT (OUTPATIENT)
Dept: ULTRASOUND IMAGING | Facility: HOSPITAL | Age: 78
End: 2018-06-15
Attending: EMERGENCY MEDICINE
Payer: MEDICARE

## 2018-06-15 VITALS
DIASTOLIC BLOOD PRESSURE: 73 MMHG | WEIGHT: 211 LBS | BODY MASS INDEX: 33 KG/M2 | TEMPERATURE: 99 F | RESPIRATION RATE: 16 BRPM | SYSTOLIC BLOOD PRESSURE: 149 MMHG | OXYGEN SATURATION: 94 % | HEART RATE: 72 BPM

## 2018-06-15 DIAGNOSIS — L03.115 CELLULITIS OF RIGHT LOWER EXTREMITY: Primary | ICD-10-CM

## 2018-06-15 PROCEDURE — 93971 EXTREMITY STUDY: CPT | Performed by: EMERGENCY MEDICINE

## 2018-06-15 PROCEDURE — 99284 EMERGENCY DEPT VISIT MOD MDM: CPT

## 2018-06-15 RX ORDER — CEFADROXIL 500 MG/1
500 CAPSULE ORAL 2 TIMES DAILY
Qty: 14 CAPSULE | Refills: 0 | Status: SHIPPED | OUTPATIENT
Start: 2018-06-15 | End: 2018-06-22

## 2018-06-15 NOTE — ED PROVIDER NOTES
Patient Seen in: [facility] AND [facility] Emergency Department    History   Patient presents with:  Deep Vein Thrombosis (cardiovascular)    Stated Complaint: rule out DVT    HPI    Pt is 65 yo F with ? Sjogrens who p/w right leg swelling, pain and redness x 1 w n/a  SpO2: 94 %  O2 Device: n/a    Current:/73   Pulse 72   Temp 99.1 °F (37.3 °C)   Resp 16   Wt 95.7 kg   SpO2 94%   BMI 33.05 kg/m²         Physical Exam    GENERAL: No acute distress, awake and alert  HEENT: MMM, EOMI, PERRL  Neck: supple, non te

## 2018-06-22 ENCOUNTER — HOSPITAL (OUTPATIENT)
Dept: OTHER | Age: 78
End: 2018-06-22
Attending: EMERGENCY MEDICINE

## 2018-06-22 LAB
ANALYZER ANC (IANC): ABNORMAL
ANION GAP SERPL CALC-SCNC: 13 MMOL/L (ref 10–20)
BASOPHILS # BLD: 0 THOUSAND/MCL (ref 0–0.3)
BASOPHILS NFR BLD: 0 %
BUN SERPL-MCNC: 23 MG/DL (ref 6–20)
BUN/CREAT SERPL: 11 (ref 7–25)
CALCIUM SERPL-MCNC: 9.6 MG/DL (ref 8.4–10.2)
CHLORIDE: 107 MMOL/L (ref 98–107)
CO2 SERPL-SCNC: 23 MMOL/L (ref 21–32)
CREAT SERPL-MCNC: 2.08 MG/DL (ref 0.51–0.95)
DIFFERENTIAL METHOD BLD: ABNORMAL
EOSINOPHIL # BLD: 0.7 THOUSAND/MCL (ref 0.1–0.5)
EOSINOPHIL NFR BLD: 8 %
ERYTHROCYTE [DISTWIDTH] IN BLOOD: 12.7 % (ref 11–15)
GLUCOSE SERPL-MCNC: 100 MG/DL (ref 65–99)
HEMATOCRIT: 37.4 % (ref 36–46.5)
HGB BLD-MCNC: 11.6 GM/DL (ref 12–15.5)
LITHIUM SERPL-SCNC: 0.7 MMOL/L (ref 0.6–1.2)
LYMPHOCYTES # BLD: 1.9 THOUSAND/MCL (ref 1–4)
LYMPHOCYTES NFR BLD: 25 %
MCH RBC QN AUTO: 31.6 PG (ref 26–34)
MCHC RBC AUTO-ENTMCNC: 31 GM/DL (ref 32–36.5)
MCV RBC AUTO: 101.9 FL (ref 78–100)
MONOCYTES # BLD: 0.4 THOUSAND/MCL (ref 0.3–0.9)
MONOCYTES NFR BLD: 5 %
NEUTROPHILS # BLD: 4.9 THOUSAND/MCL (ref 1.8–7.7)
NEUTROPHILS NFR BLD: 62 %
NEUTS SEG NFR BLD: ABNORMAL %
NRBC (NRBCRE): ABNORMAL
PLATELET # BLD: 250 THOUSAND/MCL (ref 140–450)
POTASSIUM SERPL-SCNC: 4.7 MMOL/L (ref 3.4–5.1)
RBC # BLD: 3.67 MILLION/MCL (ref 4–5.2)
SODIUM SERPL-SCNC: 138 MMOL/L (ref 135–145)
WBC # BLD: 7.8 THOUSAND/MCL (ref 4.2–11)

## 2018-07-09 ENCOUNTER — PRIOR ORIGINAL RECORDS (OUTPATIENT)
Dept: OTHER | Age: 78
End: 2018-07-09

## 2018-09-30 ENCOUNTER — HOSPITAL ENCOUNTER (EMERGENCY)
Facility: HOSPITAL | Age: 78
Discharge: HOME | End: 2018-09-30
Attending: EMERGENCY MEDICINE
Payer: MEDICARE

## 2018-09-30 VITALS
WEIGHT: 211 LBS | RESPIRATION RATE: 19 BRPM | DIASTOLIC BLOOD PRESSURE: 81 MMHG | SYSTOLIC BLOOD PRESSURE: 157 MMHG | HEIGHT: 67 IN | OXYGEN SATURATION: 97 % | HEART RATE: 73 BPM | TEMPERATURE: 98 F | BODY MASS INDEX: 33.12 KG/M2

## 2018-09-30 DIAGNOSIS — F41.9 ANXIETY: ICD-10-CM

## 2018-09-30 DIAGNOSIS — K11.7 HYPERSALIVATION: Primary | ICD-10-CM

## 2018-09-30 PROCEDURE — 99283 EMERGENCY DEPT VISIT LOW MDM: CPT

## 2018-09-30 RX ORDER — HYDROXYZINE HYDROCHLORIDE 25 MG/1
50 TABLET, FILM COATED ORAL EVERY 6 HOURS PRN
Qty: 20 TABLET | Refills: 0 | Status: SHIPPED | OUTPATIENT
Start: 2018-09-30 | End: 2018-10-08

## 2018-09-30 NOTE — ED PROVIDER NOTES
Patient Seen in: [facility] Emergency Department    History   Patient presents with:  Swallowing Problem (gastrointestinal)    Stated Complaint: \"excessive saliva production\"    HPI    66year old female with chronic hypersalivation, status post r Oral Cap,  Take 100 mg by mouth 2 (two) times daily as needed for constipation. ALPRAZolam 0.5 MG Oral Tab,  Take 1 tablet (0.5 mg total) by mouth nightly as needed. Amitriptyline HCl 50 MG Oral Tab,  Take 1 tablet (50 mg total) by mouth nightly.    HYD appearance. She does not appear ill. No distress. HENT:   Head: Normocephalic and atraumatic. Head is without raccoon's eyes, without right periorbital erythema and without left periorbital erythema.    Nose: Nose normal.   Mouth/Throat: Mucous membranes anticholinergic medications, patient understands, willing to try a new one, will try hydroxyzine given patient also has anxiety. Supportive care. Any diagnostic tests performed here were reviewed and questions answered.  Diagnosis, care plan, treatment opt

## 2018-10-08 PROBLEM — G24.01 TARDIVE DYSKINESIA: Status: ACTIVE | Noted: 2018-10-08

## 2018-10-09 PROBLEM — E55.9 VITAMIN D DEFICIENCY: Status: ACTIVE | Noted: 2018-10-09

## 2018-12-20 ENCOUNTER — HOSPITAL ENCOUNTER (EMERGENCY)
Facility: HOSPITAL | Age: 78
Discharge: HOME | End: 2018-12-20
Attending: EMERGENCY MEDICINE
Payer: MEDICARE

## 2018-12-20 VITALS
DIASTOLIC BLOOD PRESSURE: 91 MMHG | BODY MASS INDEX: 33.12 KG/M2 | HEART RATE: 69 BPM | HEIGHT: 67 IN | SYSTOLIC BLOOD PRESSURE: 181 MMHG | RESPIRATION RATE: 18 BRPM | WEIGHT: 211 LBS | TEMPERATURE: 99 F | OXYGEN SATURATION: 91 %

## 2018-12-20 DIAGNOSIS — R68.2 DRY MOUTH: Primary | ICD-10-CM

## 2018-12-20 DIAGNOSIS — T50.905A MEDICATION SIDE EFFECT, INITIAL ENCOUNTER: ICD-10-CM

## 2018-12-20 PROCEDURE — 99282 EMERGENCY DEPT VISIT SF MDM: CPT

## 2018-12-20 NOTE — ED PROVIDER NOTES
Patient Seen in: [facility] AND [facility] Emergency Department    History   Patient presents with: Allergic Rxn Allergies (immune)    Stated Complaint: Face/ lips/ tounge swelling after botox injection (yesterday).      HPI    75-year-old female presenting to t Drug use: No      Review of Systems    Positive for stated complaint: Face/ lips/ tounge swelling after botox injection (yesterday). Other systems are as noted in HPI. Constitutional and vital signs reviewed.       All other systems reviewed and negative discussed with patient and she understands that this is a normal outcome from Botox. She will follow-up with her neurologist.  I have recommended that she use sour candy as well as keeping a water bottle to wet her mouth as needed.       MDM   Differential

## 2019-02-28 VITALS
OXYGEN SATURATION: 96 % | SYSTOLIC BLOOD PRESSURE: 128 MMHG | DIASTOLIC BLOOD PRESSURE: 72 MMHG | HEART RATE: 74 BPM | BODY MASS INDEX: 34.82 KG/M2 | HEIGHT: 65 IN | WEIGHT: 209 LBS

## 2019-03-10 RX ORDER — SULFAMETHOXAZOLE AND TRIMETHOPRIM 400; 80 MG/1; MG/1
TABLET ORAL
COMMUNITY
Start: 2016-03-28

## 2019-03-10 RX ORDER — ATORVASTATIN CALCIUM 10 MG/1
1 TABLET, FILM COATED ORAL AT BEDTIME
COMMUNITY
Start: 2018-05-16 | End: 2019-04-26 | Stop reason: SDUPTHER

## 2019-03-10 RX ORDER — LEVOTHYROXINE SODIUM 0.1 MG/1
TABLET ORAL
COMMUNITY
Start: 2016-03-28

## 2019-03-10 RX ORDER — ALPRAZOLAM 0.5 MG/1
TABLET ORAL
COMMUNITY
Start: 2016-03-28

## 2019-03-10 RX ORDER — HYDROCODONE BITARTRATE AND ACETAMINOPHEN 5; 325 MG/1; MG/1
TABLET ORAL
COMMUNITY
Start: 2014-06-02

## 2019-03-10 RX ORDER — AMITRIPTYLINE HYDROCHLORIDE 50 MG/1
TABLET, FILM COATED ORAL
COMMUNITY
Start: 2016-03-28

## 2019-03-10 RX ORDER — LITHIUM CARBONATE 450 MG
TABLET, EXTENDED RELEASE ORAL
COMMUNITY
Start: 2012-03-05

## 2019-03-10 RX ORDER — MOMETASONE FUROATE 50 UG/1
SPRAY, METERED NASAL
COMMUNITY
Start: 2011-05-02

## 2019-03-13 RX ORDER — PNV NO.95/FERROUS FUM/FOLIC AC 28MG-0.8MG
TABLET ORAL
COMMUNITY

## 2019-03-13 RX ORDER — LANOLIN ALCOHOL/MO/W.PET/CERES
1000 CREAM (GRAM) TOPICAL
COMMUNITY

## 2019-03-13 RX ORDER — ERGOCALCIFEROL 1.25 MG/1
50000 CAPSULE ORAL
COMMUNITY
Start: 2018-10-09

## 2019-03-13 RX ORDER — PSEUDOEPHEDRINE HCL 30 MG
100 TABLET ORAL
COMMUNITY
Start: 2018-01-29

## 2019-03-13 RX ORDER — SCOLOPAMINE TRANSDERMAL SYSTEM 1 MG/1
1 PATCH, EXTENDED RELEASE TRANSDERMAL
COMMUNITY
Start: 2018-05-24

## 2019-03-18 ENCOUNTER — APPOINTMENT (OUTPATIENT)
Dept: CARDIOLOGY | Age: 79
End: 2019-03-18

## 2019-03-22 ENCOUNTER — HOSPITAL (OUTPATIENT)
Dept: OTHER | Age: 79
End: 2019-03-22
Attending: EMERGENCY MEDICINE

## 2019-03-22 ENCOUNTER — DIAGNOSTIC TRANS (OUTPATIENT)
Dept: OTHER | Age: 79
End: 2019-03-22

## 2019-03-22 LAB
ANALYZER ANC (IANC): ABNORMAL
ANION GAP SERPL CALC-SCNC: 14 MMOL/L (ref 10–20)
BASOPHILS # BLD: 0 THOUSAND/MCL (ref 0–0.3)
BASOPHILS NFR BLD: 0 %
BUN SERPL-MCNC: 16 MG/DL (ref 6–20)
BUN/CREAT SERPL: 8 (ref 7–25)
CALCIUM SERPL-MCNC: 9.3 MG/DL (ref 8.4–10.2)
CHLORIDE: 110 MMOL/L (ref 98–107)
CO2 SERPL-SCNC: 22 MMOL/L (ref 21–32)
CREAT SERPL-MCNC: 2.02 MG/DL (ref 0.51–0.95)
DIFFERENTIAL METHOD BLD: ABNORMAL
EOSINOPHIL # BLD: 0.7 THOUSAND/MCL (ref 0.1–0.5)
EOSINOPHIL NFR BLD: 9 %
ERYTHROCYTE [DISTWIDTH] IN BLOOD: 12.3 % (ref 11–15)
GLUCOSE SERPL-MCNC: 108 MG/DL (ref 65–99)
HEMATOCRIT: 34.7 % (ref 36–46.5)
HGB BLD-MCNC: 10.6 GM/DL (ref 12–15.5)
IMM GRANULOCYTES # BLD AUTO: 0 THOUSAND/MCL (ref 0–0.2)
IMM GRANULOCYTES NFR BLD: 0 %
LYMPHOCYTES # BLD: 2.4 THOUSAND/MCL (ref 1–4)
LYMPHOCYTES NFR BLD: 33 %
MCH RBC QN AUTO: 31 PG (ref 26–34)
MCHC RBC AUTO-ENTMCNC: 30.5 GM/DL (ref 32–36.5)
MCV RBC AUTO: 101.5 FL (ref 78–100)
MONOCYTES # BLD: 0.6 THOUSAND/MCL (ref 0.3–0.9)
MONOCYTES NFR BLD: 8 %
NEUTROPHILS # BLD: 3.6 THOUSAND/MCL (ref 1.8–7.7)
NEUTROPHILS NFR BLD: 50 %
NEUTS SEG NFR BLD: ABNORMAL %
NRBC (NRBCRE): 0 /100 WBC
PLATELET # BLD: 239 THOUSAND/MCL (ref 140–450)
POTASSIUM SERPL-SCNC: 4.4 MMOL/L (ref 3.4–5.1)
RBC # BLD: 3.42 MILLION/MCL (ref 4–5.2)
SODIUM SERPL-SCNC: 142 MMOL/L (ref 135–145)
TROPONIN I SERPL HS-MCNC: <0.02 NG/ML
WBC # BLD: 7.3 THOUSAND/MCL (ref 4.2–11)

## 2019-03-26 ENCOUNTER — HOSPITAL ENCOUNTER (INPATIENT)
Facility: HOSPITAL | Age: 79
LOS: 5 days | Discharge: NURSING, REHAB OR LONG-TERM CARE | DRG: 641 | End: 2019-04-02
Attending: EMERGENCY MEDICINE | Admitting: HOSPITALIST
Payer: MEDICARE

## 2019-03-26 DIAGNOSIS — R53.83 OTHER FATIGUE: Primary | ICD-10-CM

## 2019-03-26 DIAGNOSIS — K63.5 POLYP OF COLON, UNSPECIFIED PART OF COLON, UNSPECIFIED TYPE: ICD-10-CM

## 2019-03-26 DIAGNOSIS — K64.8 INTERNAL HEMORRHOIDS: ICD-10-CM

## 2019-03-26 DIAGNOSIS — K57.90 DIVERTICULOSIS: ICD-10-CM

## 2019-03-26 RX ORDER — LEVOTHYROXINE SODIUM 0.07 MG/1
75 TABLET ORAL
Status: DISCONTINUED | OUTPATIENT
Start: 2019-03-27 | End: 2019-04-02

## 2019-03-26 RX ORDER — AMITRIPTYLINE HYDROCHLORIDE 25 MG/1
50 TABLET, FILM COATED ORAL NIGHTLY
Status: DISCONTINUED | OUTPATIENT
Start: 2019-03-26 | End: 2019-04-02

## 2019-03-26 RX ORDER — ACETAMINOPHEN 325 MG/1
650 TABLET ORAL EVERY 6 HOURS PRN
Status: DISCONTINUED | OUTPATIENT
Start: 2019-03-26 | End: 2019-04-02

## 2019-03-26 RX ORDER — ECHINACEA PURPUREA EXTRACT 125 MG
1 TABLET ORAL AS NEEDED
Status: DISCONTINUED | OUTPATIENT
Start: 2019-03-26 | End: 2019-04-02

## 2019-03-26 RX ORDER — SODIUM CHLORIDE 450 MG/100ML
INJECTION, SOLUTION INTRAVENOUS CONTINUOUS
Status: DISCONTINUED | OUTPATIENT
Start: 2019-03-26 | End: 2019-03-27

## 2019-03-26 RX ORDER — ENOXAPARIN SODIUM 100 MG/ML
40 INJECTION SUBCUTANEOUS DAILY
Status: DISCONTINUED | OUTPATIENT
Start: 2019-03-26 | End: 2019-03-26

## 2019-03-26 RX ORDER — SODIUM CHLORIDE 9 MG/ML
INJECTION, SOLUTION INTRAVENOUS CONTINUOUS
Status: DISCONTINUED | OUTPATIENT
Start: 2019-03-26 | End: 2019-03-26

## 2019-03-26 RX ORDER — MELATONIN
325 DAILY
Status: DISCONTINUED | OUTPATIENT
Start: 2019-03-27 | End: 2019-03-31

## 2019-03-26 RX ORDER — HEPARIN SODIUM 5000 [USP'U]/ML
5000 INJECTION, SOLUTION INTRAVENOUS; SUBCUTANEOUS EVERY 12 HOURS SCHEDULED
Status: DISCONTINUED | OUTPATIENT
Start: 2019-03-26 | End: 2019-03-31

## 2019-03-26 RX ORDER — LITHIUM CARBONATE 300 MG/1
300 TABLET, FILM COATED, EXTENDED RELEASE ORAL DAILY
Status: DISCONTINUED | OUTPATIENT
Start: 2019-03-27 | End: 2019-04-02

## 2019-03-26 RX ORDER — ATORVASTATIN CALCIUM 10 MG/1
10 TABLET, FILM COATED ORAL NIGHTLY
Status: DISCONTINUED | OUTPATIENT
Start: 2019-03-26 | End: 2019-04-02

## 2019-03-26 RX ORDER — METOCLOPRAMIDE HYDROCHLORIDE 5 MG/ML
5 INJECTION INTRAMUSCULAR; INTRAVENOUS EVERY 8 HOURS PRN
Status: DISCONTINUED | OUTPATIENT
Start: 2019-03-26 | End: 2019-04-02

## 2019-03-26 RX ORDER — FLUTICASONE PROPIONATE 50 MCG
1 SPRAY, SUSPENSION (ML) NASAL DAILY
COMMUNITY
End: 2019-03-26 | Stop reason: CLARIF

## 2019-03-26 RX ORDER — ONDANSETRON 2 MG/ML
4 INJECTION INTRAMUSCULAR; INTRAVENOUS EVERY 6 HOURS PRN
Status: DISCONTINUED | OUTPATIENT
Start: 2019-03-26 | End: 2019-04-02

## 2019-03-26 RX ORDER — BISACODYL 10 MG
10 SUPPOSITORY, RECTAL RECTAL
Status: DISCONTINUED | OUTPATIENT
Start: 2019-03-26 | End: 2019-04-02

## 2019-03-26 RX ORDER — ALPRAZOLAM 0.5 MG/1
0.5 TABLET ORAL NIGHTLY PRN
Status: DISCONTINUED | OUTPATIENT
Start: 2019-03-26 | End: 2019-04-02

## 2019-03-26 RX ORDER — DOCUSATE SODIUM 100 MG/1
100 CAPSULE, LIQUID FILLED ORAL 2 TIMES DAILY
Status: DISCONTINUED | OUTPATIENT
Start: 2019-03-26 | End: 2019-04-02

## 2019-03-26 RX ORDER — ECHINACEA PURPUREA EXTRACT 125 MG
1 TABLET ORAL AS NEEDED
Status: ON HOLD | COMMUNITY
End: 2020-12-13

## 2019-03-26 RX ORDER — SULFAMETHOXAZOLE AND TRIMETHOPRIM 400; 80 MG/1; MG/1
1 TABLET ORAL EVERY EVENING
Status: DISCONTINUED | OUTPATIENT
Start: 2019-03-26 | End: 2019-04-02

## 2019-03-26 RX ORDER — FLUTICASONE PROPIONATE 50 MCG
2 SPRAY, SUSPENSION (ML) NASAL DAILY
Status: DISCONTINUED | OUTPATIENT
Start: 2019-03-27 | End: 2019-04-02

## 2019-03-26 RX ORDER — SODIUM CHLORIDE 0.9 % (FLUSH) 0.9 %
3 SYRINGE (ML) INJECTION AS NEEDED
Status: DISCONTINUED | OUTPATIENT
Start: 2019-03-26 | End: 2019-04-02

## 2019-03-26 RX ORDER — POLYETHYLENE GLYCOL 3350 17 G/17G
17 POWDER, FOR SOLUTION ORAL
Status: DISCONTINUED | OUTPATIENT
Start: 2019-03-26 | End: 2019-04-02

## 2019-03-26 NOTE — PROGRESS NOTES
[facility] Pharmacy Note:  Renal Dose Adjustment for Metoclopramide (REGLAN)    Jessica Aleman has been prescribed Metoclopramide (REGLAN) 10 mg every 8 hours as needed for nausea,vomiting. Estimated Creatinine Clearance: 20.3 mL/min (A) (based on SCr of 2. 1

## 2019-03-26 NOTE — H&P
[address]    History & Physical (or consult)    Mavis Valadez Patient Status:  Emergency    1940 MRN [id number]   Location [address] Attending Rachell Kraft MD   [address] Day # 0 Smoker      Smokeless tobacco: Never Used    Alcohol use: Yes      Comment: occ      Drug use: No    Allergies/Medications:    Allergies:   Allegra-D               ANXIETY  Benadryl [Banophen]     ANXIETY  Thorazine [Chlorpro*    HIVES    (Not in a hospital 10.8*   HCT  36.2   MCV  105.2*   MCH  31.4   MCHC  29.8*   RDW  12.6   NEPRELIM  4.40   WBC  7.6   PLT  244.0       Recent Labs   Lab  03/26/19   1241   GLU  96   BUN  17   CREATSERUM  2.18*   GFRAA  24*   GFRNAA  21*   CA  9.4   NA  144   K  4.3   CL  11

## 2019-03-26 NOTE — ED NOTES
Orders for admission, patient is aware of plan and ready to go upstairs.  Any questions, please call ED VIOLETA Celaya  at extension [phone]

## 2019-03-26 NOTE — ED PROVIDER NOTES
Patient Seen in: [address] [place] [address] Emergency Department    History   Patient presents with:  Fatigue (constitutional, neurologic)    Stated Complaint: weakness, poss uti    HPI    22-year-old female with history of CKD, hypertension, hyperlipidemia, hypo Constitutional: Positive for fatigue. Negative for appetite change and fever. HENT: Negative for congestion, ear pain and sore throat. Eyes: Negative for pain, discharge and redness.    Respiratory: Negative for cough, shortness of breath and wheezin to person, place, and time. 5/5 strength in b/l UEs and LEs, normal sensation in all extremities, no facial asymmetry, normal speech     Skin: Skin is warm and dry. No rash noted. She is not diaphoretic. Psychiatric: She has a normal mood and affect. Result Value Ref Range    Hold Lt Green Auto Resulted    RAINBOW DRAW GOLD    Collection Time: 03/26/19 12:41 PM   Result Value Ref Range    Hold Gold Auto Resulted    CBC W/ DIFFERENTIAL    Collection Time: 03/26/19 12:41 PM   Result Value Ref Range already has has Bipolar disorder, unspecified ([facility] [address] Mixed hyperlipidemia; Hypothyroidism; Essential hypertension, benign; Chronic kidney disease, stage III (moderate) ([facility] [address] B12 deficiency; TIA (transient ischemic attack); Incomplete bladder emptying;  Rec

## 2019-03-26 NOTE — ED INITIAL ASSESSMENT (HPI)
C/o weakness/fatigue + increased cognitive impairment since Friday. Pts family member states she has been increasingly forgetful since Friday in addition to weakness. Pt denies HA, n/v/d or abdominal pain, denies urinary symptoms, cp or sob.  Pt states thes

## 2019-03-27 PROCEDURE — 99222 1ST HOSP IP/OBS MODERATE 55: CPT | Performed by: OTHER

## 2019-03-27 RX ORDER — HYDROCODONE BITARTRATE AND ACETAMINOPHEN 10; 325 MG/1; MG/1
1 TABLET ORAL 2 TIMES DAILY PRN
Status: DISCONTINUED | OUTPATIENT
Start: 2019-03-27 | End: 2019-04-02

## 2019-03-27 RX ORDER — XYLITOL/YERBA SANTA
AEROSOL, SPRAY WITH PUMP (ML) MUCOUS MEMBRANE AS NEEDED
Status: DISCONTINUED | OUTPATIENT
Start: 2019-03-27 | End: 2019-04-02

## 2019-03-27 NOTE — CONSULTS
[facility]    Report of Consultation    Axel Salgado Patient Status:  Observation    1940 MRN [id number]   Location [facility] 5SW/SE Attending Dorian Gibbons MD   Hosp Day # 0 PCP Javier Fletcher MD     Date of Admis History  Past Surgical History:   Procedure Laterality Date   • BENIGN BIOPSY LEFT      over 10 years ago   • CHOLECYSTECTOMY  2001   • COLONOSCOPY  2006    repeat 2011   • GENICULAR NERVE BLOCK Right 1/8/2018    Performed by Marcia Foley MD at [place] [address] Oral Daily   Heparin Sodium (Porcine) 5000 UNIT/ML injection 5,000 Units 5,000 Units Subcutaneous 2 times per day       Medications Prior to Admission:  Saline Nasal Spray 0.65 % Nasal Solution 1 spray by Nasal route as needed for congestion.    Polyethylen Axillary, resp. rate 18, height 67\", weight 211 lb (95.7 kg), SpO2 95 %, not currently breastfeeding.     Intake/Output Summary (Last 24 hours) at 3/27/2019 1338  Last data filed at 3/27/2019 1200  Gross per 24 hour   Intake 1217.5 ml   Output 2220 ml   Ne

## 2019-03-27 NOTE — PLAN OF CARE
Pt complains of pain in right leg/knee. Takes Norco 10 2 tabs at home BID, one tab ordered in hospital. PT/OT seen pt. Up to chair with 2 assist and walker, pt is weak. Nephrology seen pt IV fluids adjusted.  Up to chair , returned with 2 Max assist.     Pelon Jha

## 2019-03-27 NOTE — PROGRESS NOTES
[address]    Progress Note    Gulshan Vanessa Patient Status:  Observation    1940 MRN [id number]   Location [facility] 5SW/SE Attending Leonel Pham MD   Hosp Day # 0 PCP Stefan Foster MD Nae Corbett is a(n) 78year old female up in chair, feeling better, but not back to baseline.   Still some trouble ambulating    Her mouth feels really dry    Objective:   Blood pressure 155/73, pulse 59, temperature 98.8 °F (37.1 °C), temperature source Axillar Heparin Sodium (Porcine) 5000 UNIT/ML injection 5,000 Units 5,000 Units Subcutaneous 2 times per day       Lab Data:  Reviewed in [address] [place] [address]   Lab  03/26/19   1241  03/27/19   0653   RBC  3.44*  3.03*   HGB  10.8*  9.6*   HCT  36.2  32.4*   MCV  1

## 2019-03-27 NOTE — CM/SW NOTE
SW received phone call from brother/JOSIASA/Kolby in regards to pt. Ettie Pruden stated concern of pt being Observation vs inpt. SW explained. Brother concerned if pt needs rehab, insurance will not pay.  SW discussed that PT has been consulted and SW will follow up

## 2019-03-27 NOTE — PLAN OF CARE
Problem: Patient Centered Care  Goal: Patient preferences are identified and integrated in the patient's plan of care  Interventions:  - What would you like us to know as we care for you? Pt lives at home alone.   - Provide timely, complete, and accurate in needed  - Ensure adequate protection for wounds/incisions during mobilization  - Obtain PT/OT consults as needed  - Advance activity as appropriate  - Communicate ordered activity level and limitations with patient/family  Outcome: Progressing      Problem coordinating discharge planning if the patient needs post-hospital services based on physician/LIP order or complex needs related to functional status, cognitive ability or social support system  Outcome: Progressing  SW to assist with d/c planning (pt alec

## 2019-03-27 NOTE — OCCUPATIONAL THERAPY NOTE
OCCUPATIONAL THERAPY EVALUATION - INPATIENT     Room Number: 559/559-A  Evaluation Date: 3/27/2019  Type of Evaluation: Initial       Physician Order: IP Consult to Occupational Therapy  Reason for Therapy: ADL/IADL Dysfunction and Discharge Planning    OC education;Patient/Family training;Equipment eval/education(goals  4-10)       OCCUPATIONAL THERAPY MEDICAL/SOCIAL HISTORY     Problem List  Principal Problem:    Other fatigue      Past Medical History  Past Medical History:   Diagnosis Date   • Anxi rate)          ACTIVITY TOLERANCE                         O2 SATURATIONS  SPO2 on Room Air at Rest: 99             COGNITION  Overall Cognitive Status:  WFL - within functional limits    VISION  wears glasses    Communication: Pt was able to answer questio session/findings; All patient questions and concerns addressed; Family present    OT Goals  Patients self stated goal is: return to her apt     Patient will complete functional transfer with mod I  Comment:     Patient will complete toileting with mod I  Com

## 2019-03-27 NOTE — CONSULTS
Neurology Inpatient Consult Note    Dorene Spencer : 1940   Referring Physician: Dr. Nicolas Steiner  HPI:     Dorene Spencer is a 78year old female who is being seen in neurologic evaluation.     Patient being seen in evaluation for generalized w Social History:  Social History    Socioeconomic History      Marital status:        Spouse name: Not on file      Number of children: Not on file      Years of education: Not on file      Highest education level: Not on file    Tobacco Use      Sm Age-appropriate parenchymal volume loss with mild chronic microvascular     ischemic disease. There is also large vessel atherosclerosis.       Folic acid normal, lithium level 0.9, vitamin B12 normal, TSH normal    ASSESSMENT AND PLAN:   Weakness  Sialorrh

## 2019-03-28 NOTE — PLAN OF CARE
Problem: Patient Centered Care  Goal: Patient preferences are identified and integrated in the patient's plan of care  Interventions:  - What would you like us to know as we care for you? Pt lives at home alone.   - Provide timely, complete, and accurate in Communicate ordered activity level and limitations with patient/family  Outcome: Progressing  Up in chair.  Stand pivot 1-2 assist    Problem: Impaired Functional Mobility  Goal: Achieve highest/safest level of mobility/gait  Interventions:  - Assess patien post-hospital services based on physician/LIP order or complex needs related to functional status, cognitive ability or social support system  Outcome: Progressing      Problem: METABOLIC/FLUID AND ELECTROLYTES - ADULT  Goal: Electrolytes maintained within

## 2019-03-28 NOTE — PLAN OF CARE
Spoke with pharmacist to verify pt's current iv infusion (dextrose 5% with Nacl 0.2%)  and she said it's ok.

## 2019-03-28 NOTE — PHYSICAL THERAPY NOTE
PHYSICAL THERAPY TREATMENT NOTE - INPATIENT     Room Number: 559/559-A       Presenting Problem: Fatigue, weakness poor balance    Problem List  Principal Problem:    Other fatigue      PHYSICAL THERAPY ASSESSMENT   Pt is received in the chair and was agre -  Dynamic Standing: Poor +    ACTIVITY TOLERANCE                         O2 WALK                  AM-PAC '6-Clicks' INPATIENT SHORT FORM - BASIC MOBILITY  How much difficulty does the patient currently have. ..  -   Turning over in bed (including adjusting Patient will negotiate 0 stairs/one curb w/ assistive device and supervision   Goal #4   Current Status N/A   Goal #5 Patient to demonstrate independence with home activity/exercise instructions provided to patient in preparation for discharge.    Goal #5

## 2019-03-28 NOTE — PROGRESS NOTES
[address]    Progress Note    Elina Primus Patient Status:  Observation    1940 MRN [id number]   Location [facility] 5SW/SE Attending Reina De La Paz MD   Hosp Day # 0 PCP Susi Gonzalez MD contact me or the on call [facility] hospitalist at [phone] with any questions/concerns. Marifer Serna MD         Subjective:   Cc: follow up hospitalization    Meri Jones is a(n) 78year old female up in chair, feeling better, but not back to baseline. acetaminophen (TYLENOL) tab 650 mg 650 mg Oral Q6H PRN   ondansetron HCl (ZOFRAN) injection 4 mg 4 mg Intravenous Q6H PRN   Metoclopramide HCl (REGLAN) injection 5 mg 5 mg Intravenous Q8H PRN   docusate sodium (COLACE) cap 100 mg 100 mg Oral BID   bisaco

## 2019-03-28 NOTE — PLAN OF CARE
Problem: Patient Centered Care  Goal: Patient preferences are identified and integrated in the patient's plan of care  Interventions:  - What would you like us to know as we care for you? Pt lives at home alone.   - Provide timely, complete, and accurate in handling equipment as needed  - Ensure adequate protection for wounds/incisions during mobilization  - Obtain PT/OT consults as needed  - Advance activity as appropriate  - Communicate ordered activity level and limitations with patient/family  Outcome: Pr coordinating discharge planning if the patient needs post-hospital services based on physician/LIP order or complex needs related to functional status, cognitive ability or social support system  Outcome: Progressing  Comments: Xanax given for sleep, melissa

## 2019-03-28 NOTE — CM/SW NOTE
Recommendations are for SNF vs HHC/24hr supervision. SW contacted brother/Kolby to notify of recommendations. Rachel Oliverchristina stated that the hospital has put them in a \"very tough situation\" and that going to SNF under private pay cannot be done.  SW discussed h

## 2019-03-29 PROCEDURE — 90792 PSYCH DIAG EVAL W/MED SRVCS: CPT | Performed by: OTHER

## 2019-03-29 RX ORDER — POLYETHYLENE GLYCOL 3350 17 G/17G
17 POWDER, FOR SOLUTION ORAL ONCE
Status: DISCONTINUED | OUTPATIENT
Start: 2019-03-29 | End: 2019-04-02

## 2019-03-29 RX ORDER — CLONIDINE 0.2 MG/24H
1 PATCH, EXTENDED RELEASE TRANSDERMAL WEEKLY
Status: DISCONTINUED | OUTPATIENT
Start: 2019-03-29 | End: 2019-03-30

## 2019-03-29 RX ORDER — DEXTROSE MONOHYDRATE 50 MG/ML
INJECTION, SOLUTION INTRAVENOUS CONTINUOUS
Status: DISCONTINUED | OUTPATIENT
Start: 2019-03-29 | End: 2019-03-31

## 2019-03-29 RX ORDER — PILOCARPINE HYDROCHLORIDE 5 MG/1
5 TABLET, FILM COATED ORAL 3 TIMES DAILY
Status: DISCONTINUED | OUTPATIENT
Start: 2019-03-29 | End: 2019-04-02

## 2019-03-29 NOTE — PLAN OF CARE
Problem: Patient Centered Care  Goal: Patient preferences are identified and integrated in the patient's plan of care  Interventions:  - What would you like us to know as we care for you? Pt lives at home alone.   - Provide timely, complete, and accurate in ambulation using safe patient handling equipment as needed  - Ensure adequate protection for wounds/incisions during mobilization  - Obtain PT/OT consults as needed  - Advance activity as appropriate  - Communicate ordered activity level and limitations wi the patient needs post-hospital services based on physician/LIP order or complex needs related to functional status, cognitive ability or social support system  Outcome: Progressing      Problem: METABOLIC/FLUID AND ELECTROLYTES - ADULT  Goal: Electrolytes

## 2019-03-29 NOTE — PHYSICAL THERAPY NOTE
Attempted treatment, pt just got up to the chair with the RN and asked to return later. Will follow up if schedule permits.

## 2019-03-29 NOTE — CONSULTS
[facility]    Report of Consultation    Jeremi Matias Patient Status:  Inpatient    1940 MRN [id number]   Location [facility] 5SW/SE Attending Samantha Reyes MD   Hosp Day # 1 PCP Armando Brown MD     Date of Admission:  3/26 had in her mouth and nose. Patient reported that it is very bothersome and sometimes she will wake up in the night with the saliva around her cheek and on the pillow.   Patient reported that she is overwhelmed with these difficulty causing her to not sleep 1/2013   • UTI (lower urinary tract infection)    • Visual impairment        Past Surgical History  Past Surgical History:   Procedure Laterality Date   • BENIGN BIOPSY LEFT      over 10 years ago   • CHOLECYSTECTOMY  2001   • COLONOSCOPY  2006    repeat 2 injection 4 mg 4 mg Intravenous Q6H PRN   Metoclopramide HCl (REGLAN) injection 5 mg 5 mg Intravenous Q8H PRN   docusate sodium (COLACE) cap 100 mg 100 mg Oral BID   bisacodyl (DULCOLAX) rectal suppository 10 mg 10 mg Rectal Daily PRN   Lithium Carbonate E DAY       Allergies    Allegra-D               ANXIETY  Benadryl [Banophen]     ANXIETY  Thorazine [Chlorpro*    HIVES      Review of Systems:     As by Admitting/Attending    Results:     Laboratory Data:  Lab Results   Component Value Date    WBC 5.8 03/ numbers otherwise appropriate. Judgment insight are appropriate    Impression:   Impression:  Bipolar disorder type I recent episode unspecified. Drug-induced sialorrhea        Discussed risk and benefit, acknowledging the current symptom and severity.

## 2019-03-29 NOTE — CM/SW NOTE
Inpt order was placed - unsure at this time if pt will stay as inpt or revert back to OBS. SW contacted brother/Kolby to notify of [facility] private pay cost. Brother stated he would like to see if pt remains inpt before making more decisions.      Pt will

## 2019-03-29 NOTE — PROGRESS NOTES
[facility]    Progress Note    Encino Yudelka Patient Status:  Inpatient    1940 MRN [id number]   Location [facility] 5SW/SE Attending Velasquez Matamoros MD   Hosp Day # 1 PCP Acacia Snider MD       Subjective:   Tess Jha is 1,847 (H) 03/26/2019                   Wade Arias MD  3/29/2019

## 2019-03-29 NOTE — PHYSICAL THERAPY NOTE
PHYSICAL THERAPY TREATMENT NOTE - INPATIENT     Room Number: 559/559-A       Presenting Problem: Fatigue, weakness poor balance    Problem List  Principal Problem:    Other fatigue      PHYSICAL THERAPY ASSESSMENT   Pt is received in the chair and was agre Static Standing: Poor +  Dynamic Standing: Poor +    ACTIVITY TOLERANCE                         O2 WALK                  AM-PAC '6-Clicks' INPATIENT SHORT FORM - BASIC MOBILITY  How much difficulty does the patient currently have. ..  -   Turning over in and chair follow for safety.     Goal #4 Patient will negotiate 0 stairs/one curb w/ assistive device and supervision   Goal #4   Current Status N/A   Goal #5 Patient to demonstrate independence with home activity/exercise instructions provided to patient i

## 2019-03-29 NOTE — PROGRESS NOTES
[address]    Progress Note    Emily Favors Patient Status:  Observation    1940 MRN [id number]   Location [facility] 5SW/SE Attending Mariela Viramontes MD   Hosp Day # 1 PCP Sapphire Velarde MD contact me or the on call [facility] hospitalist at [phone] with any questions/concerns. Mamta Rueda MD         Subjective:   Cc: follow up hospitalization    Peng Friend is a(n) 78year old female up in chair, feeling better, but not back to baseline. [facility]) injection 4 mg 4 mg Intravenous Q6H PRN   Metoclopramide HCl (REGLAN) injection 5 mg 5 mg Intravenous Q8H PRN   docusate sodium (COLACE) cap 100 mg 100 mg Oral BID   bisacodyl (DULCOLAX) rectal suppository 10 mg 10 mg Rectal Daily PRN   Lithium Ca

## 2019-03-30 PROCEDURE — 99233 SBSQ HOSP IP/OBS HIGH 50: CPT | Performed by: OTHER

## 2019-03-30 NOTE — OCCUPATIONAL THERAPY NOTE
Attempted to see for OT session - pt had just returned back to bed after sitting up in b/s chair all am, using bathroom and going to eat lunch - will continue to follow and attempt to see at later time as schedule permits

## 2019-03-30 NOTE — PROGRESS NOTES
[address]    Progress Note    Kaela Hatch Patient Status:  Observation    1940 MRN [id number]   Location [facility] 5SW/SE Attending Benoit Ventura MD   Hosp Day # 2 PCP Cherise Burroughs MD DO     Subjective:   Cc: follow up hospitalization    Meri Jones is a(n) 78year old female up in chair, feeling better, but not back to baseline.   Still generally weak with ambulation, but worked well with PT yesterday,       Objective:   Blood pres tab 325 mg 325 mg Oral Daily   Normal Saline Flush 0.9 % injection 3 mL 3 mL Intravenous PRN   acetaminophen (TYLENOL) tab 650 mg 650 mg Oral Q6H PRN   ondansetron HCl (ZOFRAN) injection 4 mg 4 mg Intravenous Q6H PRN   Metoclopramide HCl (REGLAN) injection

## 2019-03-30 NOTE — OCCUPATIONAL THERAPY NOTE
OCCUPATIONAL THERAPY TREATMENT NOTE - INPATIENT        Room Number: 559/559-A                Problem List  Principal Problem:    Other fatigue      OCCUPATIONAL THERAPY ASSESSMENT   VIOLETA Brady approved OT session - pt needed some encouragement to participat technique education    SUBJECTIVE  \" I am never going to walk again\"    OBJECTIVE  Precautions: Bed/chair alarm    WEIGHT BEARING RESTRICTION  Weight Bearing Restriction: None                PAIN ASSESSMENT  Rating: (c/o pain R knee, did not rate) to standing tolerance, balance     Patient will tolerate standing for ~4 minutes in prep for adls with mod I   Comment: 1 min with min a     Patient will complete LE dressing mod I, AE prn  Comment: NT            Goals  on: 4-10-19  Frequency: 3-5x/w

## 2019-03-30 NOTE — PLAN OF CARE
Problem: SKIN/TISSUE INTEGRITY - ADULT  Goal: Skin integrity remains intact  INTERVENTIONS  - Assess and document risk factors for pressure ulcer development  - Assess and document skin integrity  - Monitor for areas of redness and/or skin breakdown  - Ini imbalances  - Administer electrolyte replacement as ordered  - Monitor response to electrolyte replacements, including rhythm and repeat lab results as appropriate  - Fluid restriction as ordered  - Instruct patient on fluid and nutrition restrictions as a

## 2019-03-30 NOTE — PLAN OF CARE
Problem: Patient Centered Care  Goal: Patient preferences are identified and integrated in the patient's plan of care  Interventions:  - What would you like us to know as we care for you? Pt lives at home alone.   - Provide timely, complete, and accurate in safe patient handling equipment as needed  - Ensure adequate protection for wounds/incisions during mobilization  - Obtain PT/OT consults as needed  - Advance activity as appropriate  - Communicate ordered activity level and limitations with patient/family post-hospital services based on physician/LIP order or complex needs related to functional status, cognitive ability or social support system  Outcome: Progressing      Problem: METABOLIC/FLUID AND ELECTROLYTES - ADULT  Goal: Electrolytes maintained within

## 2019-03-30 NOTE — PROGRESS NOTES
[facility]    [facility] Note    Xiomy Michele Patient Status:  Inpatient    1940 MRN [id number]   [facility] 5SW/SE Attending Asa Shepard DO   Hosp Day # 2 PCP Bridget Mai MD       Subjective:   Xiomy Michele 9.4  9.5  8.9   NA  147*  147*  143   K  4.7  4.7  5.0   CL  122*  121*  117*   CO2  22.0  21.0  21.0                    Aishwarya Cabezas MD  3/30/2019

## 2019-03-31 PROCEDURE — 99222 1ST HOSP IP/OBS MODERATE 55: CPT | Performed by: INTERNAL MEDICINE

## 2019-03-31 RX ORDER — CALCIUM CARBONATE 200(500)MG
500 TABLET,CHEWABLE ORAL 4 TIMES DAILY PRN
Status: DISCONTINUED | OUTPATIENT
Start: 2019-03-31 | End: 2019-04-02

## 2019-03-31 NOTE — PROGRESS NOTES
[facility]    Progress Note    Jessica Aleman Patient Status:  Inpatient    1940 MRN [id number]   [facility] 5SW/SE Attending Fela Torres,    Hosp Day # 3 PCP Yaakov Armstrong MD       Subjective:   Jessica Aleman 22.0                    Aishwarya Cabezas MD  3/31/2019

## 2019-03-31 NOTE — PROGRESS NOTES
[facility]    [facility]    Jeremi Matias Patient Status:  Observation    1940 MRN [id number]   Location [facility] 5SW/SE Attending Talon Benson MD   Hosp Day # 3 PCP Armando Brown MD Discharge Date: SNF in 1-2 days pending EGD/Colon and bmp    >35 minutes spent in total patient care  >50% of time in face to face d/w patient    Will continue to follow while hospitalized.   Please contact me or the on call [facility] hospitalist at [phone] QPM   Saline Nasal Spray (SALINE MIST) 1 spray 1 spray Nasal PRN   PEG 3350 (MIRALAX) powder packet 17 g 17 g Oral Daily PRN   Levothyroxine Sodium (SYNTHROID, LEVOTHROID) tab 75 mcg 75 mcg Oral QAM AC   Fluticasone Propionate (FLONASE) 50 MCG/ACT nasal sp 03/26/2019 3.380 mIU/mL   10/08/2018 2.479 mIU/L   09/22/2015 1.140 uIU/mL   06/04/2008 0.191 uIU/mL (L)     No results for input(s): TROP, CK in the last 168 hours.

## 2019-03-31 NOTE — PLAN OF CARE
Problem: SKIN/TISSUE INTEGRITY - ADULT  Goal: Skin integrity remains intact  INTERVENTIONS  - Assess and document risk factors for pressure ulcer development  - Assess and document skin integrity  - Monitor for areas of redness and/or skin breakdown  - Ini aware of procedure consent sign in chart,

## 2019-03-31 NOTE — PHYSICAL THERAPY NOTE
PHYSICAL THERAPY TREATMENT NOTE - INPATIENT     Room Number: 559/559-A       Presenting Problem: Fatigue, weakness poor balance    Problem List  Principal Problem:    Other fatigue      PHYSICAL THERAPY ASSESSMENT   Pt is received in the chair and was agre TOLERANCE                         O2 WALK                  AM-PAC '6-Clicks' INPATIENT SHORT FORM - BASIC MOBILITY  How much difficulty does the patient currently have. ..  -   Turning over in bed (including adjusting bedclothes, sheets and blankets)?: YULIYA Schmidt independence with home activity/exercise instructions provided to patient in preparation for discharge.    Goal #5   Current Status Educated and performed   Goal #6    Goal #6  Current Status

## 2019-03-31 NOTE — PLAN OF CARE
Problem: Patient Centered Care  Goal: Patient preferences are identified and integrated in the patient's plan of care  Interventions:  - What would you like us to know as we care for you? Pt lives at home alone.   - Provide timely, complete, and accurate in Communicate ordered activity level and limitations with patient/family  Outcome: Progressing      Problem: Impaired Functional Mobility  Goal: Achieve highest/safest level of mobility/gait  Interventions:  - Assess patient's functional ability and stabilit cognitive ability or social support system  Outcome: Progressing      Problem: METABOLIC/FLUID AND ELECTROLYTES - ADULT  Goal: Electrolytes maintained within normal limits  INTERVENTIONS:  - Monitor labs and rhythm and assess patient for signs and symptoms

## 2019-03-31 NOTE — CONSULTS
Gastroenterology consultation note    Reason for consultation:  Chronic macrocytic anemia, Hemoccult positive stool      History of present illness: The patient is a 78year old female who is seen at the bedside today along with her brother.   The patient cholesterol    • HOSPITALIZATIONS 2006    lithium toxicity   • HOSPITALIZATIONS 2008    cardiac cath, stenting of OM   • HYPERLIPIDEMIA    • HYPERTENSION    • HYPOTHYROIDISM    • Muscle weakness    • Osteoarthritis    • OSTEOPENIA    • OTHER DISEASES     b rectal suppository 10 mg 10 mg Rectal Daily PRN   [place] Carbonate ER (LITHOBID) CR tab 300 mg 300 mg Oral Daily   Heparin Sodium (Porcine) 5000 UNIT/ML injection 5,000 Units 5,000 Units Subcutaneous 2 times per day       Outpatient Medications Marked as (300 mg total) by mouth daily. Disp: 30 tablet Rfl: 0   Atorvastatin Calcium 10 MG Oral Tab TAKE ONE TABLET BY MOUTH EVERY DAY Disp: 30 Tab Rfl: 5       Social History    Socioeconomic History      Marital status:        Spouse name: Not on file Self-Exams: Not Asked    Social History Narrative      Not on file        Family History   Problem Relation Age of Onset   • Breast Cancer Mother 61   • Breast Cancer Maternal Aunt        A comprehensive 10 point review of systems was completed.   Pert Hematocrit      35.0 - 48.0 % 34.3 (L) 34.7 (L) 36.3    Platelet Count      438.2 - 450.0 10(3)uL 210.0 221.0 208.0    MCV      80.0 - 100.0 fL 106.5 (H) 108.1 (H) 107.4 (H)    MCH      26.0 - 34.0 pg 31.1 31.5 30.8    MCHC      31.0 - 37.0 g/dL 29.2 (L) 0.39   Eosinophils Absolute      0.00 - 0.70 x10(3) uL 0.49 0.60  0.52 (H)   Basophils Absolute      0.00 - 0.20 x10(3) uL 0.01 0.03  0.01   Immature Granulocyte Absolute      0.00 - 1.00 x10(3) uL 0.01 0.03     Neutrophils %      % 55.4 58.1  48.7   Lymph (L) 2.48 (L) 2.68 (L)    Hemoglobin      12.0 - 16.0 g/dL 9.6 (L) 8.0 (L) 8.6 (L) 8.4 (L)   Hematocrit      35.0 - 48.0 % 30.3 (L) 24.9 (L) 27.1 (L) 25.9 (L)   Platelet Count      290.1 - 450.0 10(3)uL 215 189 197    MCV      80.0 - 100.0 fL 100.2 (H) 100. Immature Granulocyte Absolute      0.00 - 1.00 x10(3) uL       Neutrophils %      % 58      Lymphocytes %      % 29      Monocytes %      % 5      Eosinophils %      % 8      Basophils %      % 1      Immature Granulocyte %      %         Component Occult Blood Negative Positive Abnormal     Resulting Agency  [facility]         Specimen Collected: 03/29/19  8:18 PM Last Resulted: 03/29/19 10:38 PM                Assessment and Plan:    Impression:  Macrocytic anemia, Hemoccult positive stool  In

## 2019-03-31 NOTE — PHYSICAL THERAPY NOTE
Attempted treatment, pt is eating and asked to return in the afternoon. Will follow up later if schedule permits.

## 2019-03-31 NOTE — H&P (VIEW-ONLY)
Gastroenterology consultation note    Reason for consultation:  Chronic macrocytic anemia, Hemoccult positive stool      History of present illness: The patient is a 78year old female who is seen at the bedside today along with her brother.   The patient cholesterol    • HOSPITALIZATIONS 2006    lithium toxicity   • HOSPITALIZATIONS 2008    cardiac cath, stenting of OM   • HYPERLIPIDEMIA    • HYPERTENSION    • HYPOTHYROIDISM    • Muscle weakness    • Osteoarthritis    • OSTEOPENIA    • OTHER DISEASES     b rectal suppository 10 mg 10 mg Rectal Daily PRN   [place] Carbonate ER (LITHOBID) CR tab 300 mg 300 mg Oral Daily   Heparin Sodium (Porcine) 5000 UNIT/ML injection 5,000 Units 5,000 Units Subcutaneous 2 times per day       Outpatient Medications Marked as (300 mg total) by mouth daily. Disp: 30 tablet Rfl: 0   Atorvastatin Calcium 10 MG Oral Tab TAKE ONE TABLET BY MOUTH EVERY DAY Disp: 30 Tab Rfl: 5       Social History    Socioeconomic History      Marital status:        Spouse name: Not on file Self-Exams: Not Asked    Social History Narrative      Not on file        Family History   Problem Relation Age of Onset   • Breast Cancer Mother 61   • Breast Cancer Maternal Aunt        A comprehensive 10 point review of systems was completed.   Pert Hematocrit      35.0 - 48.0 % 34.3 (L) 34.7 (L) 36.3    Platelet Count      757.0 - 450.0 10(3)uL 210.0 221.0 208.0    MCV      80.0 - 100.0 fL 106.5 (H) 108.1 (H) 107.4 (H)    MCH      26.0 - 34.0 pg 31.1 31.5 30.8    MCHC      31.0 - 37.0 g/dL 29.2 (L) 0.39   Eosinophils Absolute      0.00 - 0.70 x10(3) uL 0.49 0.60  0.52 (H)   Basophils Absolute      0.00 - 0.20 x10(3) uL 0.01 0.03  0.01   Immature Granulocyte Absolute      0.00 - 1.00 x10(3) uL 0.01 0.03     Neutrophils %      % 55.4 58.1  48.7   Lymph (L) 2.48 (L) 2.68 (L)    Hemoglobin      12.0 - 16.0 g/dL 9.6 (L) 8.0 (L) 8.6 (L) 8.4 (L)   Hematocrit      35.0 - 48.0 % 30.3 (L) 24.9 (L) 27.1 (L) 25.9 (L)   Platelet Count      016.8 - 450.0 10(3)uL 215 189 197    MCV      80.0 - 100.0 fL 100.2 (H) 100. Immature Granulocyte Absolute      0.00 - 1.00 x10(3) uL       Neutrophils %      % 58      Lymphocytes %      % 29      Monocytes %      % 5      Eosinophils %      % 8      Basophils %      % 1      Immature Granulocyte %      %         Component Occult Blood Negative Positive Abnormal     Resulting Agency  [facility]         Specimen Collected: 03/29/19  8:18 PM Last Resulted: 03/29/19 10:38 PM                Assessment and Plan:    Impression:  Macrocytic anemia, Hemoccult positive stool  In

## 2019-03-31 NOTE — PROGRESS NOTES
Emily Fitzgerald is a 78year old   female living in independent living with history of bipolar disorder, hypothyroidism and kidney disease who presented to the hospital for fatigue.   The patient seen today for over 35-minute, follow-up alec DISEASES     bipolar   • OTHER DISEASES     renal insufficiency   • Renal disorder    • TIA (transient ischemic attack) 1/2013   • UTI (lower urinary tract infection)    • Visual impairment       Past Surgical History:   Procedure Laterality Date   • RISSA spray Nasal Daily   ferrous sulfate EC tab 325 mg 325 mg Oral Daily   Normal Saline Flush 0.9 % injection 3 mL 3 mL Intravenous PRN   acetaminophen (TYLENOL) tab 650 mg 650 mg Oral Q6H PRN   ondansetron HCl (ZOFRAN) injection 4 mg 4 mg Intravenous Q6H PRN details, naming and numbers otherwise appropriate. Judgment insight are appropriate       ASSESSMENT/PLAN:       Bipolar disorder type I recent episode unspecified. Drug-induced sialorrhea     1. Focus on education and support.   2. Psychotherapy focusing

## 2019-04-01 ENCOUNTER — ANESTHESIA EVENT (OUTPATIENT)
Dept: ENDOSCOPY | Facility: HOSPITAL | Age: 79
DRG: 641 | End: 2019-04-01
Payer: MEDICARE

## 2019-04-01 ENCOUNTER — ANESTHESIA (OUTPATIENT)
Dept: ENDOSCOPY | Facility: HOSPITAL | Age: 79
DRG: 641 | End: 2019-04-01
Payer: MEDICARE

## 2019-04-01 PROCEDURE — 0DBM8ZX EXCISION OF DESCENDING COLON, VIA NATURAL OR ARTIFICIAL OPENING ENDOSCOPIC, DIAGNOSTIC: ICD-10-PCS | Performed by: INTERNAL MEDICINE

## 2019-04-01 PROCEDURE — 45385 COLONOSCOPY W/LESION REMOVAL: CPT | Performed by: INTERNAL MEDICINE

## 2019-04-01 PROCEDURE — 45381 COLONOSCOPY SUBMUCOUS NJX: CPT | Performed by: INTERNAL MEDICINE

## 2019-04-01 PROCEDURE — 0DBL8ZX EXCISION OF TRANSVERSE COLON, VIA NATURAL OR ARTIFICIAL OPENING ENDOSCOPIC, DIAGNOSTIC: ICD-10-PCS | Performed by: INTERNAL MEDICINE

## 2019-04-01 RX ORDER — LIDOCAINE HYDROCHLORIDE 10 MG/ML
INJECTION, SOLUTION EPIDURAL; INFILTRATION; INTRACAUDAL; PERINEURAL AS NEEDED
Status: DISCONTINUED | OUTPATIENT
Start: 2019-04-01 | End: 2019-04-01 | Stop reason: SURG

## 2019-04-01 RX ORDER — MAGNESIUM HYDROXIDE/ALUMINUM HYDROXICE/SIMETHICONE 120; 1200; 1200 MG/30ML; MG/30ML; MG/30ML
30 SUSPENSION ORAL 4 TIMES DAILY PRN
Status: DISCONTINUED | OUTPATIENT
Start: 2019-04-01 | End: 2019-04-02

## 2019-04-01 RX ORDER — GLYCOPYRROLATE 0.2 MG/ML
INJECTION, SOLUTION INTRAMUSCULAR; INTRAVENOUS AS NEEDED
Status: DISCONTINUED | OUTPATIENT
Start: 2019-04-01 | End: 2019-04-01 | Stop reason: SURG

## 2019-04-01 RX ORDER — SODIUM CHLORIDE, SODIUM LACTATE, POTASSIUM CHLORIDE, CALCIUM CHLORIDE 600; 310; 30; 20 MG/100ML; MG/100ML; MG/100ML; MG/100ML
INJECTION, SOLUTION INTRAVENOUS CONTINUOUS
Status: DISCONTINUED | OUTPATIENT
Start: 2019-04-01 | End: 2019-04-02

## 2019-04-01 RX ORDER — DEXTROSE MONOHYDRATE 25 G/50ML
50 INJECTION, SOLUTION INTRAVENOUS
Status: DISCONTINUED | OUTPATIENT
Start: 2019-04-01 | End: 2019-04-01 | Stop reason: HOSPADM

## 2019-04-01 RX ORDER — SODIUM CHLORIDE 9 MG/ML
INJECTION, SOLUTION INTRAVENOUS CONTINUOUS PRN
Status: DISCONTINUED | OUTPATIENT
Start: 2019-04-01 | End: 2019-04-01 | Stop reason: SURG

## 2019-04-01 RX ORDER — HEPARIN SODIUM 5000 [USP'U]/ML
5000 INJECTION, SOLUTION INTRAVENOUS; SUBCUTANEOUS EVERY 12 HOURS SCHEDULED
Status: DISCONTINUED | OUTPATIENT
Start: 2019-04-02 | End: 2019-04-02

## 2019-04-01 RX ORDER — NALOXONE HYDROCHLORIDE 0.4 MG/ML
80 INJECTION, SOLUTION INTRAMUSCULAR; INTRAVENOUS; SUBCUTANEOUS AS NEEDED
Status: DISCONTINUED | OUTPATIENT
Start: 2019-04-01 | End: 2019-04-01 | Stop reason: HOSPADM

## 2019-04-01 RX ADMIN — GLYCOPYRROLATE 0.2 MG: 0.2 INJECTION, SOLUTION INTRAMUSCULAR; INTRAVENOUS at 16:03:00

## 2019-04-01 RX ADMIN — SODIUM CHLORIDE: 9 INJECTION, SOLUTION INTRAVENOUS at 15:57:00

## 2019-04-01 RX ADMIN — LIDOCAINE HYDROCHLORIDE 40 MG: 10 INJECTION, SOLUTION EPIDURAL; INFILTRATION; INTRACAUDAL; PERINEURAL at 15:59:00

## 2019-04-01 NOTE — PLAN OF CARE
Problem: Patient/Family Goals  Goal: Patient/Family Long Term Goal  Patient's Long Term Goal: To return home    Interventions:  - monitor vs, labs  -administer meds  -follow md orders  -inform pt about the plan of care    - See additional Care Plan goals f and behaviors that affect risk of falls.   - [place] fall precautions as indicated by assessment.  - Educate pt/family on patient safety including physical limitations  - Instruct pt to call for assistance with activity based on assessment  - Modify envir golytely for EGD/colonoscopy in the AM. Patient drank half of the dose, will drink other half in the morning from 6911-3662. Patient complained of nausea from drinking the golytely received zofran which resolved the nausea.  Patient also complaining of hear

## 2019-04-01 NOTE — PHYSICAL THERAPY NOTE
Attempted to see patient for physical therapy session. Patient leaving to go to colonoscopy. Will attempt to see patient tomorrow for physical therapy session. RN aware and agreeable.

## 2019-04-01 NOTE — OPERATIVE REPORT
[facility] Endoscopy Report      Date of Procedure:  04/01/19      Preoperative Diagnosis:  1. Chronic anemia  2. Hemoccult positive stool  3. Remote history of adenomatous colon polyps      Postoperative Diagnosis:  1.   Multiple colon p fluid present throughout. Extensive irrigation and suctioning were performed, however, the clumps of stool could not be removed. A brief glimpse of the terminal ileum was normal.  The ileocecal valve was well preserved.  The visualized colonic mucosa from months would be advised, however, it is unlikely that the patient would be willing/able to comply. 4.  If upper tract symptoms or progressive anemia are noted in the future, the issue of an upper GI endoscopy can be revisited.           Jonathan Abel

## 2019-04-01 NOTE — ANESTHESIA POSTPROCEDURE EVALUATION
Patient: Fredy Torres    Procedure Summary     Date:  04/01/19 Room / Location:  [facility] ENDOSCOPY 01 / [facility] ENDOSCOPY    Anesthesia Start:  2533 Anesthesia Stop:  3026    Procedure:  COLONOSCOPY (N/A ) Diagnosis:  (polyps; diverticulosis; hemorrhoids)    S

## 2019-04-01 NOTE — OCCUPATIONAL THERAPY NOTE
Chart reviewed. Attempted OT treatment, however patient is transferring off of floor for EGD. Will follow up.

## 2019-04-01 NOTE — PLAN OF CARE
DISCHARGE PLANNING    • Discharge to home or other facility with appropriate resources Progressing        Impaired Functional Mobility    • Achieve highest/safest level of mobility/gait Progressing        METABOLIC/FLUID AND ELECTROLYTES - ADULT    • Elect

## 2019-04-01 NOTE — INTERVAL H&P NOTE
Pre-op Diagnosis: A    The above referenced H&P was reviewed by Yokasta Bates MD on 4/1/2019, the patient was examined and no significant changes have occurred in the patient's condition since the H&P was performed.   I discussed with the patient and

## 2019-04-01 NOTE — ANESTHESIA PREPROCEDURE EVALUATION
Anesthesia PreOp Note    HPI:     Theodore Cain is a 78year old female who presents for preoperative consultation requested by: Alcon Durán MD    Date of Surgery: 3/26/2019 - 4/1/2019    Procedure(s):  COLONOSCOPY  ESOPHAGOGASTRODUODENOSCOPY 11/28/2007      Essential hypertension, benign         Date Noted: 11/28/2007        Past Medical History:   Diagnosis Date   • Anxiety state    • B12 deficiency 2013   • Back problem    • Bipolar affective ([facility] [address]    • CKD (chronic kidney disease), stage 4 Disp:  Rfl:  3/25/2019 at AM   Ferrous Sulfate (IRON) 325 (65 Fe) MG Oral Tab Take 1 tablet by mouth daily. Disp:  Rfl:  3/25/2019 at AM   aspirin 81 MG Oral Tab Take 81 mg by mouth daily.  Disp:  Rfl:  3/25/2019 at AM   docusate sodium 100 MG Oral Cap Ta  MG per tab 1 tablet 1 tablet Oral BID PRN Andre Mortensen MD 1 tablet at 03/31/19 4143   artificial saliva substitute (MOUTH KOTE) solution SOLN  Mouth/Throat PRN MD Prerna Regan [facility] tab 0.5 mg 0.5 mg Oral Nightly PA mg total) by mouth 3 (three) times daily.  Disp: 90 tablet Rfl: 2         Allegra-D               ANXIETY  Benadryl [Banophen]     ANXIETY  Thorazine [Chlorpro*    HIVES    Family History   Problem Relation Age of Onset   • Breast Cancer Mother 61   • [place] Diet: Not Asked        Back Care: Not Asked        Exercise: Not Asked        Bike Helmet: Not Asked        Seat Belt: Not Asked        Self-Exams: Not Asked    Social History Narrative      Not on file      Available pre-op labs reviewed.   Lab Results   C there is no history of TIAs    GI/Hepatic/Renal    (+) chronic renal disease CRI,     Endo/Other    (+) hypothyroidism,     Comments: Pt and pt POA HC both state she is not diabetic     Excessive saliva   Abdominal   (+) obese,              Anesthesia Plan

## 2019-04-01 NOTE — PROGRESS NOTES
[facility]    Progress Note    Xiomy Michele Patient Status:  Inpatient    1940 MRN [id number]   Location [facility] 5SW/SE Attending Judy Rangel MD   [facility] Day # 4 PCP Bridget Mai MD       Subjective:   Peyton Leung 22.0  26.0   --                     Julissa Coyle MD  4/1/2019

## 2019-04-01 NOTE — PROGRESS NOTES
[address]    Progress Note    Jay Ordonez Patient Status:  Observation    1940 MRN [id number]   Location [facility] 5SW/SE Attending Lakeisha Franco MD   Hosp Day # 4 PCP Julia English MD Dispo/Anticipated Discharge Date: SNF in 1-2 days pending EGD/Colon and bmp  -lives by self in independent senior community     >35 minutes spent in total patient care  >50% of time in face to face d/w patient    D/w pt/brother.  Reviewed chart including Nasal PRN   PEG 3350 (MIRALAX) powder packet 17 g 17 g Oral Daily PRN   Levothyroxine Sodium (SYNTHROID, LEVOTHROID) tab 75 mcg 75 mcg Oral QAM AC   Fluticasone Propionate (FLONASE) 50 MCG/ACT nasal spray 2 spray 2 spray Nasal Daily   Normal Saline Flush 0 CA  9.4  9.5  8.9  9.2  9.9   --    ALB  2.7*   --    --   2.8*  3.0*   --    NA  147*  147*  143  143  140   --    K  4.7  4.7  5.0  4.6   --   5.1   CL  122*  121*  117*  117*  110*   --    CO2  22.0  21.0  21.0  22.0  26.0   --        TSH   Date Value

## 2019-04-01 NOTE — PROGRESS NOTES
Patient agreed to get up in rolling chair to toilet. Patient had one small BM soft unformed. Patient continued drinking golytely this AM, no BM since. Patient tolerating golytely better than last night.

## 2019-04-02 ENCOUNTER — MED REC SCAN ONLY (OUTPATIENT)
Dept: GASTROENTEROLOGY | Facility: CLINIC | Age: 79
End: 2019-04-02

## 2019-04-02 VITALS
TEMPERATURE: 98 F | WEIGHT: 211 LBS | HEART RATE: 69 BPM | RESPIRATION RATE: 16 BRPM | BODY MASS INDEX: 33.12 KG/M2 | SYSTOLIC BLOOD PRESSURE: 130 MMHG | DIASTOLIC BLOOD PRESSURE: 65 MMHG | HEIGHT: 67 IN | OXYGEN SATURATION: 93 %

## 2019-04-02 PROCEDURE — 99232 SBSQ HOSP IP/OBS MODERATE 35: CPT | Performed by: INTERNAL MEDICINE

## 2019-04-02 RX ORDER — ONDANSETRON 4 MG/1
4 TABLET, ORALLY DISINTEGRATING ORAL EVERY 6 HOURS PRN
Status: DISCONTINUED | OUTPATIENT
Start: 2019-04-02 | End: 2019-04-02

## 2019-04-02 RX ORDER — XYLITOL/YERBA SANTA
1 AEROSOL, SPRAY WITH PUMP (ML) MUCOUS MEMBRANE AS NEEDED
Qty: 1 BOTTLE | Refills: 0 | Status: SHIPPED | OUTPATIENT
Start: 2019-04-02 | End: 2019-05-02

## 2019-04-02 NOTE — PHYSICAL THERAPY NOTE
PHYSICAL THERAPY TREATMENT NOTE - INPATIENT     Room Number: 559/559-A       Presenting Problem: Fatigue, weakness poor balance    Problem List  Principal Problem:    Other fatigue      PHYSICAL THERAPY ASSESSMENT     Patient in bed agree this time son pre Standing: Poor +    ACTIVITY TOLERANCE                         O2 WALK                  AM-PAC '6-Clicks' INPATIENT SHORT FORM - BASIC MOBILITY  How much difficulty does the patient currently have. ..  -   Turning over in bed (including adjusting bedclothes Goal #4 Patient will negotiate 0 stairs/one curb w/ assistive device and supervision   Goal #4   Current Status N/A   Goal #5 Patient to demonstrate independence with home activity/exercise instructions provided to patient in preparation for discharge.

## 2019-04-02 NOTE — CM/SW NOTE
Pt has been inpt since 3/28. PAS screen completed 4/1. [facility] able to accept pt today at 530p d/c time  RN aware of d/c time  Pt & brother/Kolby aware of d/c time & ambulance   Sw contacted [facility] for ambulance (max assist, unable to pivot).  PCS form o

## 2019-04-02 NOTE — DISCHARGE SUMMARY
[facility] Internal Medicine Discharge Summary   Patient ID:  Alison Santillan  [id number]  48 year old  2/16/1940    Admit date: 3/26/2019    Discharge date and time: 4/2/2019     Attending Physician: Gunnar Cho MD     Primary Care Physician: Samir Celaya no reports of blood loss / bleeding  - hgb  down trended to 8, now back up to 10, has been stable 10-11 the past few days  - FOBT ++  - seen , apprec recs colonoscope  1,  Multiple colon polyps  2. Diverticulosis left colon  3.   Internal he XANAX  Take 1 tablet (0.5 mg total) by mouth nightly as needed. What changed:  additional instructions     HYDROcodone-acetaminophen  MG Tabs  Commonly known as:  NORCO  Take 1-2 tablets po q 8 hrs prn for pain. Max 6/day.  This is a 15 day supply  W than 30 minutes    Patient had opportunity to ask questions and state understand and agree with therapeutic plan as outlined      MD Savage Leung  [phone]  4/2/2019  3:35 PM

## 2019-04-02 NOTE — PLAN OF CARE
Problem: Patient Centered Care  Goal: Patient preferences are identified and integrated in the patient's plan of care  Interventions:  - What would you like us to know as we care for you? Pt lives at home alone.   - Provide timely, complete, and accurate in Communicate ordered activity level and limitations with patient/family  Outcome: Progressing      Problem: Impaired Functional Mobility  Goal: Achieve highest/safest level of mobility/gait  Interventions:  - Assess patient's functional ability and stabilit METABOLIC/FLUID AND ELECTROLYTES - ADULT  Goal: Electrolytes maintained within normal limits  INTERVENTIONS:  - Monitor labs and rhythm and assess patient for signs and symptoms of electrolyte imbalances  - Administer electrolyte replacement as ordered  -

## 2019-04-02 NOTE — PHYSICAL THERAPY NOTE
Patient in bed complains been weak and having sick in her stomach now. Will try to see patient later.

## 2019-04-02 NOTE — PROGRESS NOTES
[address]     Gastroenterology Progress Note    Elina Primus Patient Status:  Inpatient    1940 MRN [id number]   Location [facility] 5SW/SE Attending Fatmata Sanchez MD   Hosp Day # 5 PCP Susi Gonzalez MD bilaterally  Abdomen-Non-distended. Obese. Bowel sounds are present. There is no tenderness to palpation. There are no masses appreciated. Liver and spleen are not palpable. Skin- No jaundice. Warm and dry.   Ext: No cyanosis, clubbing or edema is ev

## 2019-04-02 NOTE — PROGRESS NOTES
[facility]    Progress Note    Xiomy Michele Patient Status:  Inpatient    1940 MRN [id number]   Location [facility] 5SW/SE Attending Judy Rangel MD   [address] Day # 5 PCP Bridget Mai MD       Subjective:   Peyton Leung 110*   --   114*   CO2  22.0  26.0   --   28.0                    Amy Black MD  4/2/2019

## 2019-04-27 ENCOUNTER — LAB REQUISITION (OUTPATIENT)
Dept: LAB | Facility: HOSPITAL | Age: 79
End: 2019-04-27
Payer: MEDICARE

## 2019-04-27 DIAGNOSIS — N18.4 CHRONIC KIDNEY DISEASE, STAGE IV (SEVERE) (HCC): ICD-10-CM

## 2019-04-27 PROCEDURE — 81003 URINALYSIS AUTO W/O SCOPE: CPT | Performed by: FAMILY MEDICINE

## 2019-04-30 RX ORDER — ATORVASTATIN CALCIUM 10 MG/1
TABLET, FILM COATED ORAL
Qty: 30 TABLET | Refills: 10 | Status: SHIPPED | OUTPATIENT
Start: 2019-04-30

## 2019-05-11 ENCOUNTER — APPOINTMENT (OUTPATIENT)
Dept: GENERAL RADIOLOGY | Facility: HOSPITAL | Age: 79
End: 2019-05-11
Attending: EMERGENCY MEDICINE
Payer: MEDICARE

## 2019-05-11 ENCOUNTER — HOSPITAL ENCOUNTER (EMERGENCY)
Facility: HOSPITAL | Age: 79
Discharge: HOME | End: 2019-05-11
Attending: EMERGENCY MEDICINE
Payer: MEDICARE

## 2019-05-11 ENCOUNTER — APPOINTMENT (OUTPATIENT)
Dept: CT IMAGING | Facility: HOSPITAL | Age: 79
End: 2019-05-11
Attending: EMERGENCY MEDICINE
Payer: MEDICARE

## 2019-05-11 ENCOUNTER — APPOINTMENT (OUTPATIENT)
Dept: ULTRASOUND IMAGING | Facility: HOSPITAL | Age: 79
End: 2019-05-11
Attending: EMERGENCY MEDICINE
Payer: MEDICARE

## 2019-05-11 VITALS
HEART RATE: 70 BPM | DIASTOLIC BLOOD PRESSURE: 74 MMHG | SYSTOLIC BLOOD PRESSURE: 161 MMHG | OXYGEN SATURATION: 96 % | BODY MASS INDEX: 33.12 KG/M2 | TEMPERATURE: 99 F | RESPIRATION RATE: 18 BRPM | HEIGHT: 67 IN | WEIGHT: 211 LBS

## 2019-05-11 DIAGNOSIS — R53.1 WEAKNESS GENERALIZED: Primary | ICD-10-CM

## 2019-05-11 PROCEDURE — 93005 ELECTROCARDIOGRAM TRACING: CPT

## 2019-05-11 PROCEDURE — 71045 X-RAY EXAM CHEST 1 VIEW: CPT | Performed by: EMERGENCY MEDICINE

## 2019-05-11 PROCEDURE — 83880 ASSAY OF NATRIURETIC PEPTIDE: CPT | Performed by: EMERGENCY MEDICINE

## 2019-05-11 PROCEDURE — 80048 BASIC METABOLIC PNL TOTAL CA: CPT | Performed by: EMERGENCY MEDICINE

## 2019-05-11 PROCEDURE — 36415 COLL VENOUS BLD VENIPUNCTURE: CPT

## 2019-05-11 PROCEDURE — 93971 EXTREMITY STUDY: CPT | Performed by: EMERGENCY MEDICINE

## 2019-05-11 PROCEDURE — 70450 CT HEAD/BRAIN W/O DYE: CPT | Performed by: EMERGENCY MEDICINE

## 2019-05-11 PROCEDURE — 80178 ASSAY OF LITHIUM: CPT | Performed by: EMERGENCY MEDICINE

## 2019-05-11 PROCEDURE — 93010 ELECTROCARDIOGRAM REPORT: CPT | Performed by: EMERGENCY MEDICINE

## 2019-05-11 PROCEDURE — 81001 URINALYSIS AUTO W/SCOPE: CPT | Performed by: EMERGENCY MEDICINE

## 2019-05-11 PROCEDURE — 99285 EMERGENCY DEPT VISIT HI MDM: CPT

## 2019-05-11 PROCEDURE — 84484 ASSAY OF TROPONIN QUANT: CPT | Performed by: EMERGENCY MEDICINE

## 2019-05-11 PROCEDURE — 82962 GLUCOSE BLOOD TEST: CPT

## 2019-05-11 PROCEDURE — 85025 COMPLETE CBC W/AUTO DIFF WBC: CPT | Performed by: EMERGENCY MEDICINE

## 2019-05-11 NOTE — ED NOTES
Spoke to supervisor at [facility], they will call us back if they have any bed availability.  Janeen KERNS updated

## 2019-05-11 NOTE — ED INITIAL ASSESSMENT (HPI)
Pt reports generalized weakness. Pt reports difficulty ambulating. Denies fevers. Denies CP/SOB. Denies n/v/d. Denies Urinary symptoms. Recent admission for dehydration and anemia.

## 2019-05-11 NOTE — CM/SW NOTE
Per Chapin Collado at [facility] 12 chart still in review, nurse/Josephine and Dr. [place] updated now.

## 2019-05-11 NOTE — ED PROVIDER NOTES
Patient Seen in: [facility] AND [facility] Emergency Department    History   Patient presents with:  Weakness    Stated Complaint: weakness    HPI    71-year-old female with history of hypertension, hypercholesterolemia, diabetes, chronic kidney disease, hypothy impairment        Past Surgical History:   Procedure Laterality Date   • BENIGN BIOPSY LEFT      over 10 years ago   • CHOLECYSTECTOMY  2001   • COLONOSCOPY  2006    repeat 2011   • COLONOSCOPY     • COLONOSCOPY N/A 4/1/2019    Performed by Sahil Gonzalez oriented X 3, there is no agitation    ED Course     Labs Reviewed   BASIC METABOLIC PANEL (8) - Abnormal; Notable for the following components:       Result Value    Chloride 114 (*)     Creatinine 2.07 (*)     BUN/CREA Ratio 7.2 (*)     GFR, Non- assessed the patient for possibility of transferring her back to her rehabilitation facility where she recently was admitted. Patient was accepted for transfer back to her rehab facility.             Disposition and Plan     Clinical Impression:  Sabra

## 2019-05-11 NOTE — CM/SW NOTE
Unknown of bed availability however, requested to fax referral to [phone] confirmation received. Acceptance of placement pending at this time.

## 2019-05-12 NOTE — ED NOTES
Report given to [facility] [place] [place] -  C[facility]US at [facility] [place].

## 2019-05-12 NOTE — CM/SW NOTE
Received call back from [place] who provided acceptance to a semi private room at [facility]. Room assigned is 401 and nurse ph# to call report is [phone]. Pt agreed with semi private room, nurse and MD informed of above.

## 2019-05-15 NOTE — PROGRESS NOTES
Spoke w/ pts brother, ok per HIPAA  Aware of lithium level from ER visit and will contact psych for f/u  Pt currently inpt at [facility]

## 2019-07-18 ENCOUNTER — LAB ENCOUNTER (OUTPATIENT)
Dept: LAB | Age: 79
End: 2019-07-18
Attending: INTERNAL MEDICINE
Payer: MEDICARE

## 2019-07-18 DIAGNOSIS — N25.81 SECONDARY HYPERPARATHYROIDISM OF RENAL ORIGIN (HCC): ICD-10-CM

## 2019-07-18 DIAGNOSIS — E83.52 HYPERCALCEMIA: ICD-10-CM

## 2019-07-18 DIAGNOSIS — I10 ESSENTIAL HYPERTENSION, MALIGNANT: ICD-10-CM

## 2019-07-18 DIAGNOSIS — D63.1 ANEMIA OF CHRONIC RENAL FAILURE: ICD-10-CM

## 2019-07-18 DIAGNOSIS — N18.4 CHRONIC KIDNEY DISEASE, STAGE IV (SEVERE) (HCC): Primary | ICD-10-CM

## 2019-07-18 DIAGNOSIS — N18.9 ANEMIA OF CHRONIC RENAL FAILURE: ICD-10-CM

## 2019-07-18 DIAGNOSIS — F32.9 MAJOR DEPRESSIVE DISORDER, SINGLE EPISODE, UNSPECIFIED: ICD-10-CM

## 2019-07-18 LAB
ALBUMIN SERPL-MCNC: 3.6 G/DL (ref 3.4–5)
ANION GAP SERPL CALC-SCNC: 8 MMOL/L (ref 0–18)
BUN BLD-MCNC: 24 MG/DL (ref 7–18)
BUN/CREAT SERPL: 10.8 (ref 10–20)
CALCIUM BLD-MCNC: 9.2 MG/DL (ref 8.5–10.1)
CHLORIDE SERPL-SCNC: 115 MMOL/L (ref 98–112)
CO2 SERPL-SCNC: 20 MMOL/L (ref 21–32)
CREAT BLD-MCNC: 2.23 MG/DL (ref 0.55–1.02)
GLUCOSE BLD-MCNC: 92 MG/DL (ref 70–99)
HCT VFR BLD AUTO: 41.6 % (ref 35–48)
HGB BLD-MCNC: 12.1 G/DL (ref 12–16)
OSMOLALITY SERPL CALC.SUM OF ELEC: 300 MOSM/KG (ref 275–295)
PHOSPHATE SERPL-MCNC: 3.8 MG/DL (ref 2.5–4.9)
POTASSIUM SERPL-SCNC: 4.7 MMOL/L (ref 3.5–5.1)
SODIUM SERPL-SCNC: 143 MMOL/L (ref 136–145)

## 2019-07-18 PROCEDURE — 85018 HEMOGLOBIN: CPT

## 2019-07-18 PROCEDURE — 80069 RENAL FUNCTION PANEL: CPT

## 2019-07-18 PROCEDURE — 85014 HEMATOCRIT: CPT

## 2019-08-27 ENCOUNTER — HOSPITAL ENCOUNTER (EMERGENCY)
Facility: HOSPITAL | Age: 79
Discharge: HOME | End: 2019-08-27
Attending: EMERGENCY MEDICINE
Payer: MEDICARE

## 2019-08-27 VITALS
BODY MASS INDEX: 33.12 KG/M2 | DIASTOLIC BLOOD PRESSURE: 70 MMHG | WEIGHT: 211 LBS | OXYGEN SATURATION: 97 % | HEART RATE: 67 BPM | TEMPERATURE: 98 F | HEIGHT: 67 IN | RESPIRATION RATE: 18 BRPM | SYSTOLIC BLOOD PRESSURE: 148 MMHG

## 2019-08-27 DIAGNOSIS — K11.7 EXCESSIVE SALIVATION: Primary | ICD-10-CM

## 2019-08-27 PROCEDURE — 99282 EMERGENCY DEPT VISIT SF MDM: CPT

## 2019-08-28 NOTE — ED INITIAL ASSESSMENT (HPI)
Pt reports chronic excessive saliva x 1.5 yrs. Reports increased production x 1 day. Pt states last night she began choking on saliva. Denies any difficulty in breathing at this time. Airway intact.

## 2019-08-28 NOTE — ED NOTES
Pt to ER with c/o excessive saliva today. Pt states she has been to many specialist over the past year and a half without relief. MD at bedside. No respiratory distress noted. No drooling noted. Pt is alert and oriented x4.

## 2019-08-28 NOTE — ED PROVIDER NOTES
Patient Seen in: [facility] Emergency Department    History   No chief complaint on file. HPI    Patient presents to the ED complaining of chronic excessive saliva for the past year and a half.   She states that she constantly produces more sa Breast Cancer Maternal Aunt        Smoking Status: Social History    Socioeconomic History      Marital status:        Spouse name: Not on file      Number of children: Not on file      Years of education: Not on file      Highest education level: No TempSrc: Oral   SpO2: 97%   Weight: 95.7 kg   Height: 170.2 cm (5' 7\")     *I personally reviewed and interpreted all ED vitals.     Pulse Ox: 97%, Room air, Normal     Differential Diagnosis/ Diagnostic Considerations: Excessive salivation    Medical Re

## 2019-10-22 PROBLEM — K21.9 LARYNGOPHARYNGEAL REFLUX (LPR): Status: ACTIVE | Noted: 2019-10-22

## 2019-11-09 ENCOUNTER — HOSPITAL (OUTPATIENT)
Dept: OTHER | Age: 79
End: 2019-11-09

## 2019-11-19 ENCOUNTER — HOSPITAL (OUTPATIENT)
Dept: OTHER | Age: 79
End: 2019-11-19

## 2019-12-07 ENCOUNTER — HOSPITAL (OUTPATIENT)
Dept: OTHER | Age: 79
End: 2019-12-07

## 2019-12-25 ENCOUNTER — HOSPITAL ENCOUNTER (INPATIENT)
Facility: HOSPITAL | Age: 79
LOS: 4 days | Discharge: HOME HEALTH | DRG: 683 | End: 2019-12-29
Attending: EMERGENCY MEDICINE | Admitting: HOSPITALIST
Payer: MEDICARE

## 2019-12-25 DIAGNOSIS — N39.0 URINARY TRACT INFECTION WITHOUT HEMATURIA, SITE UNSPECIFIED: Primary | ICD-10-CM

## 2019-12-25 DIAGNOSIS — R41.0 DELIRIUM: ICD-10-CM

## 2019-12-25 LAB
ANION GAP SERPL CALC-SCNC: 3 MMOL/L (ref 0–18)
BASOPHILS # BLD AUTO: 0.03 X10(3) UL (ref 0–0.2)
BASOPHILS NFR BLD AUTO: 0.4 %
BILIRUB UR QL: NEGATIVE
BUN BLD-MCNC: 19 MG/DL (ref 7–18)
BUN/CREAT SERPL: 7.3 (ref 10–20)
CALCIUM BLD-MCNC: 9.9 MG/DL (ref 8.5–10.1)
CHLORIDE SERPL-SCNC: 118 MMOL/L (ref 98–112)
CLARITY UR: CLEAR
CO2 SERPL-SCNC: 23 MMOL/L (ref 21–32)
COLOR UR: YELLOW
CREAT BLD-MCNC: 2.6 MG/DL (ref 0.55–1.02)
DEPRECATED RDW RBC AUTO: 48.8 FL (ref 35.1–46.3)
EOSINOPHIL # BLD AUTO: 0.64 X10(3) UL (ref 0–0.7)
EOSINOPHIL NFR BLD AUTO: 9.3 %
ERYTHROCYTE [DISTWIDTH] IN BLOOD BY AUTOMATED COUNT: 12.4 % (ref 11–15)
GLUCOSE BLD-MCNC: 96 MG/DL (ref 70–99)
GLUCOSE UR-MCNC: NEGATIVE MG/DL
HCT VFR BLD AUTO: 40.9 % (ref 35–48)
HGB BLD-MCNC: 12.4 G/DL (ref 12–16)
HGB UR QL STRIP.AUTO: NEGATIVE
IMM GRANULOCYTES # BLD AUTO: 0.01 X10(3) UL (ref 0–1)
IMM GRANULOCYTES NFR BLD: 0.1 %
KETONES UR-MCNC: NEGATIVE MG/DL
LITHIUM SERPL-SCNC: 1.2 MMOL/L (ref 0.6–1.2)
LYMPHOCYTES # BLD AUTO: 2.14 X10(3) UL (ref 1–4)
LYMPHOCYTES NFR BLD AUTO: 31.1 %
MCH RBC QN AUTO: 32 PG (ref 26–34)
MCHC RBC AUTO-ENTMCNC: 30.3 G/DL (ref 31–37)
MCV RBC AUTO: 105.4 FL (ref 80–100)
MONOCYTES # BLD AUTO: 0.45 X10(3) UL (ref 0.1–1)
MONOCYTES NFR BLD AUTO: 6.5 %
NEUTROPHILS # BLD AUTO: 3.61 X10 (3) UL (ref 1.5–7.7)
NEUTROPHILS # BLD AUTO: 3.61 X10(3) UL (ref 1.5–7.7)
NEUTROPHILS NFR BLD AUTO: 52.6 %
NITRITE UR QL STRIP.AUTO: NEGATIVE
OSMOLALITY SERPL CALC.SUM OF ELEC: 300 MOSM/KG (ref 275–295)
PH UR: 5 [PH] (ref 5–8)
PLATELET # BLD AUTO: 212 10(3)UL (ref 150–450)
POTASSIUM SERPL-SCNC: 4.5 MMOL/L (ref 3.5–5.1)
PROT UR-MCNC: NEGATIVE MG/DL
RBC # BLD AUTO: 3.88 X10(6)UL (ref 3.8–5.3)
RBC #/AREA URNS AUTO: 1 /HPF
SODIUM SERPL-SCNC: 144 MMOL/L (ref 136–145)
SP GR UR STRIP: 1.01 (ref 1–1.03)
UROBILINOGEN UR STRIP-ACNC: <2
WBC # BLD AUTO: 6.9 X10(3) UL (ref 4–11)
WBC #/AREA URNS AUTO: 32 /HPF

## 2019-12-25 RX ORDER — ASPIRIN 81 MG/1
81 TABLET ORAL DAILY
Status: DISCONTINUED | OUTPATIENT
Start: 2019-12-26 | End: 2019-12-29

## 2019-12-25 RX ORDER — SODIUM CHLORIDE 0.9 % (FLUSH) 0.9 %
3 SYRINGE (ML) INJECTION AS NEEDED
Status: DISCONTINUED | OUTPATIENT
Start: 2019-12-25 | End: 2019-12-29

## 2019-12-25 RX ORDER — HEPARIN SODIUM 5000 [USP'U]/ML
5000 INJECTION, SOLUTION INTRAVENOUS; SUBCUTANEOUS EVERY 12 HOURS SCHEDULED
Status: DISCONTINUED | OUTPATIENT
Start: 2019-12-25 | End: 2019-12-29

## 2019-12-25 RX ORDER — LEVOTHYROXINE SODIUM 0.07 MG/1
75 TABLET ORAL
Status: DISCONTINUED | OUTPATIENT
Start: 2019-12-26 | End: 2019-12-29

## 2019-12-25 RX ORDER — ALPRAZOLAM 0.5 MG/1
0.5 TABLET ORAL NIGHTLY PRN
Status: DISCONTINUED | OUTPATIENT
Start: 2019-12-25 | End: 2019-12-29

## 2019-12-25 RX ORDER — ONDANSETRON 2 MG/ML
4 INJECTION INTRAMUSCULAR; INTRAVENOUS EVERY 6 HOURS PRN
Status: DISCONTINUED | OUTPATIENT
Start: 2019-12-25 | End: 2019-12-29

## 2019-12-25 RX ORDER — SODIUM CHLORIDE 9 MG/ML
INJECTION, SOLUTION INTRAVENOUS CONTINUOUS
Status: DISCONTINUED | OUTPATIENT
Start: 2019-12-25 | End: 2019-12-28

## 2019-12-25 RX ORDER — LITHIUM CARBONATE 300 MG/1
300 TABLET, FILM COATED, EXTENDED RELEASE ORAL DAILY
Status: DISCONTINUED | OUTPATIENT
Start: 2019-12-26 | End: 2019-12-29

## 2019-12-25 RX ORDER — FAMOTIDINE 20 MG/1
20 TABLET ORAL DAILY
Status: DISCONTINUED | OUTPATIENT
Start: 2019-12-26 | End: 2019-12-29

## 2019-12-25 RX ORDER — ACETAMINOPHEN 325 MG/1
650 TABLET ORAL EVERY 6 HOURS PRN
Status: DISCONTINUED | OUTPATIENT
Start: 2019-12-25 | End: 2019-12-29

## 2019-12-25 RX ORDER — ATORVASTATIN CALCIUM 10 MG/1
10 TABLET, FILM COATED ORAL NIGHTLY
Status: DISCONTINUED | OUTPATIENT
Start: 2019-12-25 | End: 2019-12-29

## 2019-12-25 RX ORDER — METOCLOPRAMIDE HYDROCHLORIDE 5 MG/ML
5 INJECTION INTRAMUSCULAR; INTRAVENOUS EVERY 8 HOURS PRN
Status: DISCONTINUED | OUTPATIENT
Start: 2019-12-25 | End: 2019-12-29

## 2019-12-25 RX ORDER — HYDROCODONE BITARTRATE AND ACETAMINOPHEN 10; 325 MG/1; MG/1
2 TABLET ORAL 2 TIMES DAILY PRN
Status: DISCONTINUED | OUTPATIENT
Start: 2019-12-25 | End: 2019-12-29

## 2019-12-25 RX ORDER — AMITRIPTYLINE HYDROCHLORIDE 50 MG/1
50 TABLET, FILM COATED ORAL NIGHTLY
Status: DISCONTINUED | OUTPATIENT
Start: 2019-12-25 | End: 2019-12-29

## 2019-12-25 NOTE — ED NOTES
Orders for admission, patient is aware of plan and ready to go upstairs. Any questions, please call ED RN Linda Cabrera at [address]. Patient alert and oriented X 2-3, c/o weakness, and dizziness, 2 assisted with walker.

## 2019-12-25 NOTE — H&P
[facility] Hospitalist H&P       CC: Patient presents with:  Altered Mental Status       PCP: Jonathan Moore MD    Date of Admission: 12/25/2019 11:53 AM    ASSESSMENT / PLAN:     Ms. Julian Powell is a 79 yo F with PMH of bipolar on lithium, CKD, HTN who presented wi Denies feeling like she incompletely empties her bladder. She has been on Keflex for UTI prophylaxis for about the past year.  Previously was on Bactrim but had a urine culture with E.coli that was resistant to Bactrim and switched to Keflex by her urologis Medications:  No outpatient medications have been marked as taking for the 12/25/19 encounter [facility] Encounter).         Soc Hx  Social History    Tobacco Use      Smoking status: Never Smoker      Smokeless tobacco: Never Used    Alcohol use: Yes      C

## 2019-12-25 NOTE — ED PROVIDER NOTES
Patient Seen in: [facility] AND [facility] Emergency Department      History   Patient presents with:  Altered Mental Status    Stated Complaint: R/O uti    HPI    Patient is a 22-year-old female who presents to the emergency department with a chief complaint of Horacio Awad MD at [address]   • LUCIA LOCALIZATION WIRE 1 SITE LEFT (CPT=19281)     • OTHER SURGICAL HISTORY  2000    left breast bx benign                    Social History    Tobacco Use      Smoking status: Never Smoker      Smokeless tob Notable for the following components:       Result Value    Leukocyte Esterase Urine Large (*)     WBC Urine 32 (*)     WBC Clump Moderate (*)     Bacteria Urine Many (*)     All other components within normal limits   BASIC METABOLIC PANEL (8) - Abnormal; List                   Present on Admission  Date Reviewed: 11/27/2019          ICD-10-CM Noted POA    Urinary tract infection without hematuria N39.0 12/25/2019 Unknown

## 2019-12-26 ENCOUNTER — APPOINTMENT (OUTPATIENT)
Dept: CT IMAGING | Facility: HOSPITAL | Age: 79
DRG: 683 | End: 2019-12-26
Attending: HOSPITALIST
Payer: MEDICARE

## 2019-12-26 ENCOUNTER — APPOINTMENT (OUTPATIENT)
Dept: ULTRASOUND IMAGING | Facility: HOSPITAL | Age: 79
DRG: 683 | End: 2019-12-26
Attending: HOSPITALIST
Payer: MEDICARE

## 2019-12-26 LAB
ANION GAP SERPL CALC-SCNC: 6 MMOL/L (ref 0–18)
BASOPHILS # BLD AUTO: 0.01 X10(3) UL (ref 0–0.2)
BASOPHILS NFR BLD AUTO: 0.1 %
BUN BLD-MCNC: 17 MG/DL (ref 7–18)
BUN/CREAT SERPL: 7.6 (ref 10–20)
CALCIUM BLD-MCNC: 9.4 MG/DL (ref 8.5–10.1)
CHLORIDE SERPL-SCNC: 120 MMOL/L (ref 98–112)
CO2 SERPL-SCNC: 20 MMOL/L (ref 21–32)
CREAT BLD-MCNC: 2.25 MG/DL (ref 0.55–1.02)
CREAT UR-SCNC: 25.1 MG/DL
DEPRECATED RDW RBC AUTO: 46.1 FL (ref 35.1–46.3)
EOSINOPHIL # BLD AUTO: 0.26 X10(3) UL (ref 0–0.7)
EOSINOPHIL NFR BLD AUTO: 2.2 %
ERYTHROCYTE [DISTWIDTH] IN BLOOD BY AUTOMATED COUNT: 12.2 % (ref 11–15)
GLUCOSE BLD-MCNC: 109 MG/DL (ref 70–99)
HCT VFR BLD AUTO: 40.2 % (ref 35–48)
HGB BLD-MCNC: 11.8 G/DL (ref 12–16)
IMM GRANULOCYTES # BLD AUTO: 0.07 X10(3) UL (ref 0–1)
IMM GRANULOCYTES NFR BLD: 0.6 %
LYMPHOCYTES # BLD AUTO: 0.8 X10(3) UL (ref 1–4)
LYMPHOCYTES NFR BLD AUTO: 6.8 %
MCH RBC QN AUTO: 30.5 PG (ref 26–34)
MCHC RBC AUTO-ENTMCNC: 29.4 G/DL (ref 31–37)
MCV RBC AUTO: 103.9 FL (ref 80–100)
MONOCYTES # BLD AUTO: 0.54 X10(3) UL (ref 0.1–1)
MONOCYTES NFR BLD AUTO: 4.6 %
NEUTROPHILS # BLD AUTO: 10 X10 (3) UL (ref 1.5–7.7)
NEUTROPHILS # BLD AUTO: 10 X10(3) UL (ref 1.5–7.7)
NEUTROPHILS NFR BLD AUTO: 85.7 %
OSMOLALITY SERPL CALC.SUM OF ELEC: 304 MOSM/KG (ref 275–295)
PLATELET # BLD AUTO: 206 10(3)UL (ref 150–450)
POTASSIUM SERPL-SCNC: 4.7 MMOL/L (ref 3.5–5.1)
RBC # BLD AUTO: 3.87 X10(6)UL (ref 3.8–5.3)
SODIUM SERPL-SCNC: 146 MMOL/L (ref 136–145)
SODIUM SERPL-SCNC: 51 MMOL/L
WBC # BLD AUTO: 11.7 X10(3) UL (ref 4–11)

## 2019-12-26 PROCEDURE — 99223 1ST HOSP IP/OBS HIGH 75: CPT | Performed by: OTHER

## 2019-12-26 PROCEDURE — 76770 US EXAM ABDO BACK WALL COMP: CPT | Performed by: HOSPITALIST

## 2019-12-26 NOTE — PROGRESS NOTES
[facility] Hospitalist Progress Note     CC: Hospital Follow up    PCP: Cleveland Jackson MD       Assessment/Plan:     Principal Problem:    Urinary tract infection without hematuria, site unspecified  Active Problems:    Urinary tract infection without hematuria RBC 3.88 3.87   HGB 12.4 11.8*   HCT 40.9 40.2   .4* 103.9*   MCH 32.0 30.5   MCHC 30.3* 29.4*   RDW 12.4 12.2   NEPRELIM 3.61 10.00*   WBC 6.9 11.7*   .0 206.0         Recent Labs   Lab 12/25/19  1206 12/26/19  0946   GLU 96 109*   BUN 19*

## 2019-12-26 NOTE — HOME CARE LIAISON
Received referral from [address] [place] [address]. Met with patient at the bedside. Patient is agreeable to [facility], pending orders. Residential brochure provided with contact information. All questions addressed and answered.      Patient will need a

## 2019-12-26 NOTE — PLAN OF CARE
Neurological status change since admition. Pt drowsy and slurred speech. MD ordered CT. Pt refused CT scan: MD notified. No complaints of pain. Alarm parameters appropriate for pt: bed and chair alarm.  Fall risk precautions appropriate for opt: call light retention  Description  INTERVENTIONS:  - Assess patient’s ability to void and empty bladder  - Monitor intake/output and perform bladder scan as needed  - Follow urinary retention protocol/standard of care  - Consider collaborating with pharmacy to review family use of appropriate assistive device and precautions (e.g. spinal or hip dislocation precautions)  Outcome: Progressing     Problem: RISK FOR INFECTION - ADULT  Goal: Absence of fever/infection during anticipated neutropenic period  Description  INTE

## 2019-12-26 NOTE — PROGRESS NOTES
[facility] Pharmacy Note:  Renal Dose Adjustment    Yury Brath has been prescribed famotidine (PEPCID) 20 mg orally every 12 hours. Estimated Creatinine Clearance: 17.1 mL/min (A) (based on SCr of 2.6 mg/dL (H)).     Her calculated creatinine clearance is

## 2019-12-26 NOTE — PLAN OF CARE
A/Ox4. Pt admitted to unit around 1800. Pt comfortably lying in bed. No complaints of pain. No signs of distress. Alarm parameters appropriate for pt: bed and chair alarm.  Fall risk precautions appropriate for opt: call light within reach, bed in lowest po

## 2019-12-26 NOTE — CONSULTS
Neurology Inpatient Consult Note    Domo Flores : 1940   Referring Physician: Dr. Deric Coyle  HPI:     Domo Flores is a 78year old female who is being seen in neurologic evaluation.     Patient being seen in evaluation for altered mental renal insufficiency   • Pneumonia due to organism    • Renal disorder    • TIA (transient ischemic attack) 1/2013   • UTI (lower urinary tract infection)    • Visual impairment     reading glasses      Past Surgical History:   Procedure Laterality Date   • bed  CV: regular rate and rhythm   Lungs: clear to auscultation bilaterally  Neuro:  Mental Status: Mildly drowsy, speech fluent but slow, oriented to self and hospital as well as the year, thinks it is November, naming and repetition intact, able to spell re-orienting cues (e.g. clocks, calendars), familiar staff when possible      Neurology service will continue to follow. Please page with any questions / concerns.     Karen Fall MD  [address] [place] [address]  [phone]

## 2019-12-26 NOTE — PLAN OF CARE
Problem: Patient Centered Care  Goal: Patient preferences are identified and integrated in the patient's plan of care  Description  Interventions:  - What would you like us to know as we care for you? I live in my apartment alone.  Brother comes to visit wounds(s) or drain site(s) healing without S/S of infection  Description  INTERVENTIONS:  - Assess and document risk factors for pressure ulcer development  - Assess and document skin integrity  - Assess and document dressing/incision, wound bed, drain sit call for assistance with activity based on assessment  - Modify environment to reduce risk of injury  - Provide assistive devices as appropriate  - Consider OT/PT consult to assist with strengthening/mobility  - Encourage toileting schedule  Outcome: Progr

## 2019-12-27 ENCOUNTER — APPOINTMENT (OUTPATIENT)
Dept: CT IMAGING | Facility: HOSPITAL | Age: 79
DRG: 683 | End: 2019-12-27
Attending: EMERGENCY MEDICINE
Payer: MEDICARE

## 2019-12-27 LAB
ALBUMIN SERPL-MCNC: 3.2 G/DL (ref 3.4–5)
ALBUMIN/GLOB SERPL: 0.8 {RATIO} (ref 1–2)
ALP LIVER SERPL-CCNC: 101 U/L (ref 55–142)
ALT SERPL-CCNC: 9 U/L (ref 13–56)
ANION GAP SERPL CALC-SCNC: 4 MMOL/L (ref 0–18)
APTT PPP: 33.2 SECONDS (ref 23.2–35.3)
AST SERPL-CCNC: 10 U/L (ref 15–37)
BASOPHILS # BLD AUTO: 0.02 X10(3) UL (ref 0–0.2)
BASOPHILS NFR BLD AUTO: 0.3 %
BILIRUB SERPL-MCNC: 0.6 MG/DL (ref 0.1–2)
BUN BLD-MCNC: 15 MG/DL (ref 7–18)
BUN/CREAT SERPL: 6.8 (ref 10–20)
CALCIUM BLD-MCNC: 9.1 MG/DL (ref 8.5–10.1)
CHLORIDE SERPL-SCNC: 124 MMOL/L (ref 98–112)
CO2 SERPL-SCNC: 22 MMOL/L (ref 21–32)
CREAT BLD-MCNC: 2.22 MG/DL (ref 0.55–1.02)
DEPRECATED RDW RBC AUTO: 48.5 FL (ref 35.1–46.3)
EOSINOPHIL # BLD AUTO: 0.43 X10(3) UL (ref 0–0.7)
EOSINOPHIL NFR BLD AUTO: 5.9 %
ERYTHROCYTE [DISTWIDTH] IN BLOOD BY AUTOMATED COUNT: 12.3 % (ref 11–15)
GLOBULIN PLAS-MCNC: 3.8 G/DL (ref 2.8–4.4)
GLUCOSE BLD-MCNC: 98 MG/DL (ref 70–99)
GLUCOSE BLDC GLUCOMTR-MCNC: 87 MG/DL (ref 70–99)
HCT VFR BLD AUTO: 40.1 % (ref 35–48)
HGB BLD-MCNC: 11.9 G/DL (ref 12–16)
IMM GRANULOCYTES # BLD AUTO: 0.02 X10(3) UL (ref 0–1)
IMM GRANULOCYTES NFR BLD: 0.3 %
INR BLD: 1.13 (ref 0.9–1.2)
LYMPHOCYTES # BLD AUTO: 2.05 X10(3) UL (ref 1–4)
LYMPHOCYTES NFR BLD AUTO: 28 %
M PROTEIN MFR SERPL ELPH: 7 G/DL (ref 6.4–8.2)
MCH RBC QN AUTO: 31.5 PG (ref 26–34)
MCHC RBC AUTO-ENTMCNC: 29.7 G/DL (ref 31–37)
MCV RBC AUTO: 106.1 FL (ref 80–100)
MONOCYTES # BLD AUTO: 0.42 X10(3) UL (ref 0.1–1)
MONOCYTES NFR BLD AUTO: 5.7 %
NEUTROPHILS # BLD AUTO: 4.39 X10 (3) UL (ref 1.5–7.7)
NEUTROPHILS # BLD AUTO: 4.39 X10(3) UL (ref 1.5–7.7)
NEUTROPHILS NFR BLD AUTO: 59.8 %
OSMOLALITY SERPL CALC.SUM OF ELEC: 311 MOSM/KG (ref 275–295)
PLATELET # BLD AUTO: 182 10(3)UL (ref 150–450)
POTASSIUM SERPL-SCNC: 4.4 MMOL/L (ref 3.5–5.1)
PROTHROMBIN TIME: 14.4 SECONDS (ref 11.8–14.5)
RBC # BLD AUTO: 3.78 X10(6)UL (ref 3.8–5.3)
SODIUM SERPL-SCNC: 150 MMOL/L (ref 136–145)
TROPONIN I SERPL-MCNC: <0.045 NG/ML (ref ?–0.04)
WBC # BLD AUTO: 7.3 X10(3) UL (ref 4–11)

## 2019-12-27 PROCEDURE — 70450 CT HEAD/BRAIN W/O DYE: CPT | Performed by: EMERGENCY MEDICINE

## 2019-12-27 PROCEDURE — 99232 SBSQ HOSP IP/OBS MODERATE 35: CPT | Performed by: OTHER

## 2019-12-27 RX ORDER — ASPIRIN 81 MG/1
162 TABLET, CHEWABLE ORAL DAILY
Status: DISPENSED | OUTPATIENT
Start: 2019-12-27 | End: 2019-12-28

## 2019-12-27 RX ORDER — ASPIRIN 81 MG/1
162 TABLET, CHEWABLE ORAL DAILY
Status: DISCONTINUED | OUTPATIENT
Start: 2019-12-27 | End: 2019-12-27

## 2019-12-27 NOTE — PROGRESS NOTES
Neurology Inpatient Follow-up Note      HPI:     Patient being seen in follow up. Interval notes and workup reviewed. Patient had RRT overnight for confusion. RRT note reviewed, and I also spoke with Dr. Washington Rodriguez overnight about this event.   Patient now m state  Slurred speech  Presentation most consistent with toxic metabolic encephalopathy, which can be expected to fluctuate.     –Noncontrast head CT result reviewed     –I updated brother at bedside and my clinical impressions, recommendations     –Contin

## 2019-12-27 NOTE — PLAN OF CARE
Problem: Patient Centered Care  Goal: Patient preferences are identified and integrated in the patient's plan of care  Description  Interventions:  - What would you like us to know as we care for you? \"I live home alone. \"  - Provide timely, complete, a infection  Description  INTERVENTIONS:  - Assess and document risk factors for pressure ulcer development  - Assess and document skin integrity  - Assess and document dressing/incision, wound bed, drain sites and surrounding tissue  - Implement wound care assessment  - Modify environment to reduce risk of injury  - Provide assistive devices as appropriate  - Consider OT/PT consult to assist with strengthening/mobility  - Encourage toileting schedule  Outcome: Progressing     Problem: [address]

## 2019-12-27 NOTE — PROGRESS NOTES
[facility] Hospitalist Progress Note     CC: Hospital Follow up    PCP: Pierce Rueda MD       Assessment/Plan:     Principal Problem:    Urinary tract infection without hematuria, site unspecified  Active Problems:    Urinary tract infection without hematuria edema      Data Review:       Labs:     Recent Labs   Lab 12/25/19  1206 12/26/19  0946 12/27/19  0550   RBC 3.88 3.87 3.78*   HGB 12.4 11.8* 11.9*   HCT 40.9 40.2 40.1   .4* 103.9* 106.1*   MCH 32.0 30.5 31.5   MCHC 30.3* 29.4* 29.7*   RDW 12.4 12.

## 2019-12-27 NOTE — SIGNIFICANT EVENT
Stroke alert called. Per primary nurse patient was verbalizing and interacting about 10 minutes earlier. Now she is non verbal just staring. Will not speak. Not able to follow commands. Reportedly she spoke with staff and was able to help roll in bed.

## 2019-12-27 NOTE — PROGRESS NOTES
Responded to RRT/Stroke Alert. Patient being taken for CT scan. No family present. No additional spiritual care needed at this time.        12/27/19 0604   Clinical Encounter Type   Visited With Patient   Routine Visit Introduction   Continue Visiting No

## 2019-12-27 NOTE — PLAN OF CARE
RRT    Attending PCT came to room to change the patient, pt greeted the PCT good morning and was able to turn to her side.  This writer came to room, patient was awake but wasn't talking, VS taken and recorded, pt not able to squeeze this writer's hand and

## 2019-12-27 NOTE — PLAN OF CARE
Problem: Patient Centered Care  Goal: Patient preferences are identified and integrated in the patient's plan of care  Description  Interventions:  - What would you like us to know as we care for you?   - Provide timely, complete, and accurate informatio strategies to promote bladder emptying  Outcome: Progressing     Problem: SKIN/TISSUE INTEGRITY - ADULT  Goal: Skin integrity remains intact  Description  INTERVENTIONS  - Assess and document risk factors for pressure ulcer development  - Assess and docume growth factors as ordered  - Implement neutropenic guidelines  Outcome: Progressing     Problem: SAFETY ADULT - FALL  Goal: Free from fall injury  Description  INTERVENTIONS:  - Assess pt frequently for physical needs  - Identify cognitive and physical def

## 2019-12-27 NOTE — SLP NOTE
ADULT SWALLOWING EVALUATION    ASSESSMENT    ASSESSMENT/OVERALL IMPRESSION:  SLP BSSE orders received and acknowledged. A swallow evaluation warranted as pt NPO until evaluated by SLP.  Pt's brother, Edenilson Teresa, at bedside reports pt tolerating regular solids a room. SLP to f/u with meal assessment x1-2, upgrade solids as appropriate, and VFSS if any overt CSA and/or decline in CXR. Pt and her brother in agreement with POC. SLP collaborated with RN for MD diet orders.    FCM SCORE: 5/7       RECOMMENDATIONS   Diet liquids  Precautions: Aspiration    Patient/Family Goals: Resume oral diet     SWALLOWING HISTORY  Current Diet Consistency: NPO(UNTIL EVALUATED BY SLP )  Dysphagia History: None at [facility]   Imaging Results:   12/27/2019 CXR   =====  CONCLUSION:   1.  No acute involvement      GOALS  Goal #1 The patient will tolerate Chopped  consistency and thin liquids without overt signs or symptoms of aspiration with 100 % accuracy over 1-2 session(s).   In Progress   Goal #2 The patient/family/caregiver will demonstrate unde

## 2019-12-27 NOTE — CM/SW NOTE
12/27 425pm: The pt. Has been accepted at [facility]. --------------------------------  12/27 344pm: SW met with the pt. At bedside and spoke with her brother Sonia Pollard [facility]) ([phone]) via telephone. The pt. Lives at [facility] apartment. The pt.

## 2019-12-28 LAB
ANION GAP SERPL CALC-SCNC: 5 MMOL/L (ref 0–18)
BUN BLD-MCNC: 15 MG/DL (ref 7–18)
BUN/CREAT SERPL: 6.8 (ref 10–20)
CALCIUM BLD-MCNC: 9.5 MG/DL (ref 8.5–10.1)
CHLORIDE SERPL-SCNC: 123 MMOL/L (ref 98–112)
CO2 SERPL-SCNC: 20 MMOL/L (ref 21–32)
CREAT BLD-MCNC: 2.2 MG/DL (ref 0.55–1.02)
GLUCOSE BLD-MCNC: 108 MG/DL (ref 70–99)
OSMOLALITY SERPL CALC.SUM OF ELEC: 307 MOSM/KG (ref 275–295)
POTASSIUM SERPL-SCNC: 4.4 MMOL/L (ref 3.5–5.1)
SODIUM SERPL-SCNC: 148 MMOL/L (ref 136–145)

## 2019-12-28 RX ORDER — DOCUSATE SODIUM 100 MG/1
100 CAPSULE, LIQUID FILLED ORAL 2 TIMES DAILY
Status: DISCONTINUED | OUTPATIENT
Start: 2019-12-28 | End: 2019-12-29

## 2019-12-28 RX ORDER — SODIUM PHOSPHATE, DIBASIC AND SODIUM PHOSPHATE, MONOBASIC 7; 19 G/133ML; G/133ML
1 ENEMA RECTAL ONCE
Status: COMPLETED | OUTPATIENT
Start: 2019-12-28 | End: 2019-12-28

## 2019-12-28 RX ORDER — PSEUDOEPHEDRINE HCL 30 MG
100 TABLET ORAL 2 TIMES DAILY
Qty: 60 CAPSULE | Refills: 0 | Status: SHIPPED | OUTPATIENT
Start: 2019-12-28 | End: 2020-01-27

## 2019-12-28 RX ORDER — DEXTROSE MONOHYDRATE 50 MG/ML
INJECTION, SOLUTION INTRAVENOUS CONTINUOUS
Status: DISCONTINUED | OUTPATIENT
Start: 2019-12-28 | End: 2019-12-29

## 2019-12-28 NOTE — PLAN OF CARE
Problem: Patient Centered Care  Goal: Patient preferences are identified and integrated in the patient's plan of care  Description  Interventions:  - What would you like us to know as we care for you? I live by myself in my own apartment.   - Provide time patient's medication profile  - Implement strategies to promote bladder emptying  Outcome: Progressing     Problem: SKIN/TISSUE INTEGRITY - ADULT  Goal: Skin integrity remains intact  Description  INTERVENTIONS  - Assess and document risk factors for press period  Description  INTERVENTIONS  - Monitor WBC  - Administer growth factors as ordered  - Implement neutropenic guidelines  Outcome: Progressing     Problem: SAFETY ADULT - FALL  Goal: Free from fall injury  Description  INTERVENTIONS:  - Assess pt freq

## 2019-12-28 NOTE — OCCUPATIONAL THERAPY NOTE
OCCUPATIONAL THERAPY EVALUATION - INPATIENT     Room Number: 539/539-A  Evaluation Date: 12/28/2019  Type of Evaluation: Initial  Presenting Problem: (UTI, delirium)    Physician Order: IP Consult to Occupational Therapy  Reason for Therapy: ADL/IADL Dysfu training; Endurance training;Equipment eval/education       OCCUPATIONAL THERAPY MEDICAL/SOCIAL HISTORY     Problem List  Principal Problem:    Urinary tract infection without hematuria, site unspecified  Active Problems:    Urinary tract infection without Equipment: Comfort height toilet(with SOMMER arm rest)  Shower/Tub and Equipment: (Pt sponge bathes only)  Other Equipment: (R/W, w/c)    Occupation/Status: (retired)     Drives: No  Patient Regularly Uses: Glasses    Stairs in Home: 0  Use of Assistive Devic Degree of Impairment: 42.8%  Standardized Score (AM-PAC Scale): 40.22  CMS Modifier (G-Code): CK    FUNCTIONAL TRANSFER ASSESSMENT  Supine to Sit : Supervision(with HOB elevated, rails)  Sit to Stand: Contact guard assist  Toilet Transfer: [address]

## 2019-12-28 NOTE — PHYSICAL THERAPY NOTE
PHYSICAL THERAPY EVALUATION - INPATIENT     Room Number: 539/539-A  Evaluation Date: 12/28/2019  Type of Evaluation: Initial   Physician Order: PT Eval and Treat    Presenting Problem: (UTI delerium)  Reason for Therapy: Mobility Dysfunction and Discharge problem    • [facility]    • CKD (chronic kidney disease), stage 4 (severe)     sees nephrology   • Coronary atherosclerosis    • Depression    • Disorder of thyroid    • High blood pressure    • High cholesterol    • HOSPITALIZATIONS 2006 functional limits     Lower extremity ROM is below functional limits     Lower extremity strength is below functional limits     BALANCE  Static Sitting: Good  Dynamic Sitting: Fair -  Static Standing: Poor +  Dynamic Standing: Poor    ADDITIONAL TESTS  Ad assistance level: mod ind   Goal #3   Current Status    Goal #4    Goal #4   Current Status    Goal #5 Patient to demonstrate independence with home activity/exercise instructions provided to patient in preparation for discharge.    Goal #5   Current Status

## 2019-12-28 NOTE — PLAN OF CARE
Problem: Patient Centered Care  Goal: Patient preferences are identified and integrated in the patient's plan of care  Description  Interventions:  - What would you like us to know as we care for you? I am from home alone.   - Provide timely, complete, an profile  - Implement strategies to promote bladder emptying  Outcome: Progressing     Problem: SKIN/TISSUE INTEGRITY - ADULT  Goal: Skin integrity remains intact  Description  INTERVENTIONS  - Assess and document risk factors for pressure ulcer development discharge w/pt and caregiver  - Include patient/family/discharge partner in discharge planning  - Arrange for needed discharge resources and transportation as appropriate  - Identify discharge learning needs (meds, wound care, etc)  - Arrange for interpret

## 2019-12-29 VITALS
RESPIRATION RATE: 20 BRPM | HEIGHT: 67 IN | SYSTOLIC BLOOD PRESSURE: 144 MMHG | TEMPERATURE: 97 F | DIASTOLIC BLOOD PRESSURE: 63 MMHG | OXYGEN SATURATION: 98 % | BODY MASS INDEX: 33.12 KG/M2 | WEIGHT: 211 LBS | HEART RATE: 67 BPM

## 2019-12-29 LAB
ANION GAP SERPL CALC-SCNC: 5 MMOL/L (ref 0–18)
ANION GAP SERPL CALC-SCNC: 5 MMOL/L (ref 0–18)
BUN BLD-MCNC: 12 MG/DL (ref 7–18)
BUN BLD-MCNC: 13 MG/DL (ref 7–18)
BUN/CREAT SERPL: 5.9 (ref 10–20)
BUN/CREAT SERPL: 6.5 (ref 10–20)
CALCIUM BLD-MCNC: 9.1 MG/DL (ref 8.5–10.1)
CALCIUM BLD-MCNC: 9.1 MG/DL (ref 8.5–10.1)
CHLORIDE SERPL-SCNC: 119 MMOL/L (ref 98–112)
CHLORIDE SERPL-SCNC: 123 MMOL/L (ref 98–112)
CO2 SERPL-SCNC: 20 MMOL/L (ref 21–32)
CO2 SERPL-SCNC: 20 MMOL/L (ref 21–32)
CREAT BLD-MCNC: 2.01 MG/DL (ref 0.55–1.02)
CREAT BLD-MCNC: 2.03 MG/DL (ref 0.55–1.02)
GLUCOSE BLD-MCNC: 88 MG/DL (ref 70–99)
GLUCOSE BLD-MCNC: 98 MG/DL (ref 70–99)
OSMOLALITY SERPL CALC.SUM OF ELEC: 298 MOSM/KG (ref 275–295)
OSMOLALITY SERPL CALC.SUM OF ELEC: 306 MOSM/KG (ref 275–295)
POTASSIUM SERPL-SCNC: 4.2 MMOL/L (ref 3.5–5.1)
POTASSIUM SERPL-SCNC: 4.7 MMOL/L (ref 3.5–5.1)
SODIUM SERPL-SCNC: 144 MMOL/L (ref 136–145)
SODIUM SERPL-SCNC: 148 MMOL/L (ref 136–145)

## 2019-12-29 NOTE — PROGRESS NOTES
[facility] Hospitalist Progress Note     CC: Hospital Follow up    PCP: Elizabeth Tirado MD       Assessment/Plan:     Principal Problem:    Urinary tract infection without hematuria, site unspecified  Active Problems:    Urinary tract infection without hematuria intact  Psych: Affect- normal  SKIN: warm, dry  EXT: no edema      Data Review:       Labs:     Recent Labs   Lab 12/25/19  1206 12/26/19  0946 12/27/19  0550   RBC 3.88 3.87 3.78*   HGB 12.4 11.8* 11.9*   HCT 40.9 40.2 40.1   .4* 103.9* 106.1*   MC

## 2019-12-29 NOTE — PLAN OF CARE
Problem: Patient Centered Care  Goal: Patient preferences are identified and integrated in the patient's plan of care  Description  Interventions:  - What would you like us to know as we care for you? I live in an apartment by myself.   - Provide timely, collaborating with pharmacy to review patient's medication profile  - Implement strategies to promote bladder emptying  Outcome: Adequate for Discharge     Problem: SKIN/TISSUE INTEGRITY - ADULT  Goal: Skin integrity remains intact  Description  INTERVENTI ADULT  Goal: Absence of fever/infection during anticipated neutropenic period  Description  INTERVENTIONS  - Monitor WBC  - Administer growth factors as ordered  - Implement neutropenic guidelines  Outcome: Adequate for Discharge     Problem: SAFETY ADULT

## 2019-12-29 NOTE — CM/SW NOTE
12/29/19 1400   Discharge disposition   Expected discharge disposition Home or Self   Name of Facillity/Home Care/Hospice Residential   Outpatient services Physical therapy; Occupational therapy   Patient is Discharged to a [address] [place] [address]

## 2019-12-29 NOTE — DISCHARGE SUMMARY
General Medicine Discharge Summary     Patient ID:  Jay Ordonez  78year old  2/16/1940    Admit date: 12/25/2019    Discharge date and time: 12/29/19    Attending Physician: Cat Lawrence MD     Consults: IP CONSULT TO HOSPITALIST  IP CONSULT TO Freddy Bhatt Code Status: DNR  O2:     Home Medication Changes:     Med list     Medication List      CHANGE how you take these medications    ALPRAZolam 0.5 MG Tabs  Commonly known as:  XANAX  Take 1 tablet (0.5 mg total) by mouth nightly as needed.   What changed:  ad TAKE ONE TABLET BY MOUTH ONE TIME DAILY     Lithium Carbonate  MG Tbcr  Commonly known as:  LITHOBID  Take 1 tablet (300 mg total) by mouth daily.      MIRALAX OR     NEOSPORIN/BURN RELIEF 1 % Oint  Generic drug:  Cjbwv-Rsmvs-Bdblzvp-Pramoxine     Sal

## 2019-12-29 NOTE — PLAN OF CARE
Reviewed discharge paperwork (including new medications, changed medications and when to take her medications next) with the patient and her brother Rachel Roche. Patient offered no further questions.  Patient's brother is transporting her to her apartment via pr

## 2020-01-07 PROCEDURE — 88305 TISSUE EXAM BY PATHOLOGIST: CPT | Performed by: INTERNAL MEDICINE

## 2020-01-07 PROCEDURE — 88342 IMHCHEM/IMCYTCHM 1ST ANTB: CPT | Performed by: INTERNAL MEDICINE

## 2020-01-07 PROCEDURE — 88341 IMHCHEM/IMCYTCHM EA ADD ANTB: CPT | Performed by: INTERNAL MEDICINE

## 2020-03-01 ENCOUNTER — APPOINTMENT (OUTPATIENT)
Dept: GENERAL RADIOLOGY | Age: 80
End: 2020-03-01
Attending: EMERGENCY MEDICINE

## 2020-03-01 ENCOUNTER — HOSPITAL ENCOUNTER (EMERGENCY)
Age: 80
Discharge: HOME | End: 2020-03-01
Attending: EMERGENCY MEDICINE

## 2020-03-01 VITALS
OXYGEN SATURATION: 97 % | WEIGHT: 248.68 LBS | BODY MASS INDEX: 41.38 KG/M2 | SYSTOLIC BLOOD PRESSURE: 171 MMHG | RESPIRATION RATE: 20 BRPM | DIASTOLIC BLOOD PRESSURE: 90 MMHG | TEMPERATURE: 97.5 F | HEART RATE: 68 BPM

## 2020-03-01 DIAGNOSIS — L30.9 DERMATITIS OF FACE: Primary | ICD-10-CM

## 2020-03-01 LAB
ALBUMIN SERPL-MCNC: 3.2 G/DL (ref 3.6–5.1)
ALBUMIN/GLOB SERPL: 0.8 {RATIO} (ref 1–2.4)
ALP SERPL-CCNC: 96 UNITS/L (ref 45–117)
ALT SERPL-CCNC: 10 UNITS/L
ANION GAP SERPL CALC-SCNC: 11 MMOL/L (ref 10–20)
AST SERPL-CCNC: 19 UNITS/L
BASOPHILS # BLD: 0 K/MCL (ref 0–0.3)
BASOPHILS NFR BLD: 0 %
BILIRUB SERPL-MCNC: 0.7 MG/DL (ref 0.2–1)
BUN SERPL-MCNC: 25 MG/DL (ref 6–20)
BUN/CREAT SERPL: 12 (ref 7–25)
CALCIUM SERPL-MCNC: 10 MG/DL (ref 8.4–10.2)
CHLORIDE SERPL-SCNC: 114 MMOL/L (ref 98–107)
CO2 SERPL-SCNC: 21 MMOL/L (ref 21–32)
CREAT SERPL-MCNC: 2.01 MG/DL (ref 0.51–0.95)
DIFFERENTIAL METHOD BLD: ABNORMAL
EOSINOPHIL # BLD: 0.5 K/MCL (ref 0.1–0.5)
EOSINOPHIL NFR BLD: 8 %
ERYTHROCYTE [DISTWIDTH] IN BLOOD: 12.2 % (ref 11–15)
ERYTHROCYTE [SEDIMENTATION RATE] IN BLOOD BY WESTERGREN METHOD: 18 MM/HR (ref 0–20)
GLOBULIN SER-MCNC: 3.9 G/DL (ref 2–4)
GLUCOSE SERPL-MCNC: 95 MG/DL (ref 65–99)
HCT VFR BLD CALC: 38.2 % (ref 36–46.5)
HGB BLD-MCNC: 11.6 G/DL (ref 12–15.5)
IMM GRANULOCYTES # BLD AUTO: 0 K/MCL (ref 0–0.2)
IMM GRANULOCYTES NFR BLD: 0 %
LYMPHOCYTES # BLD: 1.6 K/MCL (ref 1–4)
LYMPHOCYTES NFR BLD: 24 %
MCH RBC QN AUTO: 31.4 PG (ref 26–34)
MCHC RBC AUTO-ENTMCNC: 30.4 G/DL (ref 32–36.5)
MCV RBC AUTO: 103.5 FL (ref 78–100)
MONOCYTES # BLD: 0.4 K/MCL (ref 0.3–0.9)
MONOCYTES NFR BLD: 5 %
NEUTROPHILS # BLD: 4.2 K/MCL (ref 1.8–7.7)
NEUTROPHILS NFR BLD: 63 %
NRBC BLD MANUAL-RTO: 0 /100 WBC
PLATELET # BLD: 196 K/MCL (ref 140–450)
POTASSIUM SERPL-SCNC: 5.2 MMOL/L (ref 3.4–5.1)
PROT SERPL-MCNC: 7.1 G/DL (ref 6.4–8.2)
RBC # BLD: 3.69 MIL/MCL (ref 4–5.2)
SODIUM SERPL-SCNC: 141 MMOL/L (ref 135–145)
WBC # BLD: 6.7 K/MCL (ref 4.2–11)

## 2020-03-01 PROCEDURE — 85652 RBC SED RATE AUTOMATED: CPT

## 2020-03-01 PROCEDURE — 99284 EMERGENCY DEPT VISIT MOD MDM: CPT

## 2020-03-01 PROCEDURE — 80053 COMPREHEN METABOLIC PANEL: CPT

## 2020-03-01 PROCEDURE — 10002803 HB RX 637: Performed by: EMERGENCY MEDICINE

## 2020-03-01 PROCEDURE — 85025 COMPLETE CBC W/AUTO DIFF WBC: CPT

## 2020-03-01 PROCEDURE — 73610 X-RAY EXAM OF ANKLE: CPT

## 2020-03-01 PROCEDURE — 73590 X-RAY EXAM OF LOWER LEG: CPT

## 2020-03-01 RX ORDER — HYDROCODONE BITARTRATE AND ACETAMINOPHEN 5; 325 MG/1; MG/1
2 TABLET ORAL ONCE
Status: COMPLETED | OUTPATIENT
Start: 2020-03-01 | End: 2020-03-01

## 2020-03-01 RX ADMIN — HYDROCODONE BITARTRATE AND ACETAMINOPHEN 2 TABLET: 5; 325 TABLET ORAL at 07:12

## 2020-03-01 SDOH — HEALTH STABILITY: MENTAL HEALTH: HOW OFTEN DO YOU HAVE A DRINK CONTAINING ALCOHOL?: NEVER

## 2020-03-01 ASSESSMENT — ENCOUNTER SYMPTOMS
PSYCHIATRIC NEGATIVE: 1
EYES NEGATIVE: 1
GASTROINTESTINAL NEGATIVE: 1
RESPIRATORY NEGATIVE: 1
CONSTITUTIONAL NEGATIVE: 1

## 2020-03-01 ASSESSMENT — PAIN SCALES - GENERAL
PAINLEVEL_OUTOF10: 8
PAINLEVEL_OUTOF10: 5
PAINLEVEL_OUTOF10: 6
PAINLEVEL_OUTOF10: 3

## 2020-03-01 ASSESSMENT — PAIN DESCRIPTION - PAIN TYPE: TYPE: ACUTE PAIN

## 2020-03-17 ENCOUNTER — TELEPHONE (OUTPATIENT)
Dept: SURGERY | Facility: CLINIC | Age: 80
End: 2020-03-17

## 2020-03-17 NOTE — TELEPHONE ENCOUNTER
pt's brother, Lamontfariba Manley calling to make NP appt-has CSF leak. Ref by [facility]. Please advise.  Call Brother Lamont Manley back to make appt

## 2020-04-20 ENCOUNTER — TELEPHONE (OUTPATIENT)
Dept: NEUROSURGERY | Age: 80
End: 2020-04-20

## 2020-05-11 ENCOUNTER — TELEPHONE (OUTPATIENT)
Dept: SURGERY | Facility: CLINIC | Age: 80
End: 2020-05-11

## 2020-05-19 ENCOUNTER — VIRTUAL PHONE E/M (OUTPATIENT)
Dept: SURGERY | Facility: CLINIC | Age: 80
End: 2020-05-19
Payer: MEDICARE

## 2020-05-19 DIAGNOSIS — G96.00: Primary | ICD-10-CM

## 2020-05-19 PROCEDURE — 99442 PHONE E/M BY PHYS 11-20 MIN: CPT | Performed by: NEUROLOGICAL SURGERY

## 2020-05-20 NOTE — PROGRESS NOTES
Virtual Telephone Check-In    Jessica Aleman verbally consents to a Virtual/Telephone Check-In visit on 05/20/20. Patient has been referred to the [facility] website at [email or web address] to review the yearly Consent to Treat document.     Patient unders ever been tested for CSF markers and her brother who was on the phone call said no. Interval Examination: Her speech is fluent. I had her look in all directions and there is no diplopia. Her hearing was intact and equal bilaterally to finger rubbing.

## 2020-05-21 ENCOUNTER — PATIENT MESSAGE (OUTPATIENT)
Dept: SURGERY | Facility: CLINIC | Age: 80
End: 2020-05-21

## 2020-05-22 NOTE — TELEPHONE ENCOUNTER
From: Dann Beauchamp  To: Lonnie Mora MD  Sent: 5/21/2020 3:03 PM CDT  Subject: Visit Follow-up Question    Dear Dr. Demetris Zhang,  My sister, Jose Braga, has chosen [facility] in [place] [address] for her imaging procedure.  Please fax he order them at [phone]

## 2020-05-22 NOTE — TELEPHONE ENCOUNTER
Imaging orders printed: MRI pituitary and CT sinuses. Orders faxed to [facility] in [facility] to fax number:    [phone]    Number confirmed at [address] [place] [address] website.       In OV note with Dr. Raji Black from virtual phone visit conducted on 5/19/20:

## 2020-05-28 ENCOUNTER — HOSPITAL ENCOUNTER (OUTPATIENT)
Dept: CT IMAGING | Facility: HOSPITAL | Age: 80
Discharge: HOME | End: 2020-05-28
Attending: NEUROLOGICAL SURGERY
Payer: MEDICARE

## 2020-05-28 DIAGNOSIS — G96.00: ICD-10-CM

## 2020-05-28 PROCEDURE — 70486 CT MAXILLOFACIAL W/O DYE: CPT | Performed by: NEUROLOGICAL SURGERY

## 2020-05-29 ENCOUNTER — TELEPHONE (OUTPATIENT)
Dept: SURGERY | Facility: CLINIC | Age: 80
End: 2020-05-29

## 2020-05-29 NOTE — TELEPHONE ENCOUNTER
Spoke with James from [address] [place] [address] regarding patient's MRI order. Per Dr. Delmi Farmer in [address] note on 5/19/20:    \"Plan: Consultation with Rohini Rojas MD, ENT for exam, possible endoscopy and collection of specimen for beta transferrin testing.   CT scan o

## 2020-06-08 ENCOUNTER — TELEPHONE (OUTPATIENT)
Dept: SURGERY | Facility: CLINIC | Age: 80
End: 2020-06-08

## 2020-06-09 ENCOUNTER — TELEPHONE (OUTPATIENT)
Dept: SURGERY | Facility: CLINIC | Age: 80
End: 2020-06-09

## 2020-06-09 NOTE — TELEPHONE ENCOUNTER
Spoke with spouse-Kolby who stated they will stop by the office to drop off the CD of the MRI to be uploaded. Report sent to scan. Copy placed in file to be endorsed to [facility] for review.

## 2020-06-09 NOTE — TELEPHONE ENCOUNTER
No MRI sella in PACS. Was this uploaded yet? Otherwise family will need to drop the disc off again. Will need to see if able to be visualized on CD.  When family brings this, nursing can we please take disc to back computer to make sure images can be viewed

## 2020-06-10 NOTE — TELEPHONE ENCOUNTER
Pt's brother Leonard Rosenberg brought in CD of imaging, uploaded & viewable in PACS; CD given back to brother

## 2020-07-27 ENCOUNTER — HOSPITAL ENCOUNTER (EMERGENCY)
Facility: HOSPITAL | Age: 80
Discharge: HOME | End: 2020-07-27
Attending: EMERGENCY MEDICINE
Payer: MEDICARE

## 2020-07-27 ENCOUNTER — APPOINTMENT (OUTPATIENT)
Dept: ULTRASOUND IMAGING | Facility: HOSPITAL | Age: 80
End: 2020-07-27
Attending: EMERGENCY MEDICINE
Payer: MEDICARE

## 2020-07-27 VITALS
DIASTOLIC BLOOD PRESSURE: 79 MMHG | SYSTOLIC BLOOD PRESSURE: 154 MMHG | RESPIRATION RATE: 18 BRPM | HEART RATE: 65 BPM | OXYGEN SATURATION: 97 % | TEMPERATURE: 99 F

## 2020-07-27 DIAGNOSIS — M79.89 LOCALIZED SWELLING OF BOTH LOWER EXTREMITIES: ICD-10-CM

## 2020-07-27 DIAGNOSIS — L03.115 CELLULITIS OF LEG, RIGHT: Primary | ICD-10-CM

## 2020-07-27 LAB
ANION GAP SERPL CALC-SCNC: 3 MMOL/L (ref 0–18)
BASOPHILS # BLD AUTO: 0.02 X10(3) UL (ref 0–0.2)
BASOPHILS NFR BLD AUTO: 0.2 %
BUN BLD-MCNC: 24 MG/DL (ref 7–18)
BUN/CREAT SERPL: 10.7 (ref 10–20)
CALCIUM BLD-MCNC: 9.2 MG/DL (ref 8.5–10.1)
CHLORIDE SERPL-SCNC: 115 MMOL/L (ref 98–112)
CO2 SERPL-SCNC: 23 MMOL/L (ref 21–32)
CREAT BLD-MCNC: 2.25 MG/DL (ref 0.55–1.02)
DEPRECATED RDW RBC AUTO: 47.7 FL (ref 35.1–46.3)
EOSINOPHIL # BLD AUTO: 0.64 X10(3) UL (ref 0–0.7)
EOSINOPHIL NFR BLD AUTO: 7.5 %
ERYTHROCYTE [DISTWIDTH] IN BLOOD BY AUTOMATED COUNT: 12.4 % (ref 11–15)
GLUCOSE BLD-MCNC: 95 MG/DL (ref 70–99)
HCT VFR BLD AUTO: 37.9 % (ref 35–48)
HGB BLD-MCNC: 11.5 G/DL (ref 12–16)
IMM GRANULOCYTES # BLD AUTO: 0.03 X10(3) UL (ref 0–1)
IMM GRANULOCYTES NFR BLD: 0.4 %
LYMPHOCYTES # BLD AUTO: 2.26 X10(3) UL (ref 1–4)
LYMPHOCYTES NFR BLD AUTO: 26.5 %
MCH RBC QN AUTO: 31.6 PG (ref 26–34)
MCHC RBC AUTO-ENTMCNC: 30.3 G/DL (ref 31–37)
MCV RBC AUTO: 104.1 FL (ref 80–100)
MONOCYTES # BLD AUTO: 0.6 X10(3) UL (ref 0.1–1)
MONOCYTES NFR BLD AUTO: 7 %
NEUTROPHILS # BLD AUTO: 4.99 X10 (3) UL (ref 1.5–7.7)
NEUTROPHILS # BLD AUTO: 4.99 X10(3) UL (ref 1.5–7.7)
NEUTROPHILS NFR BLD AUTO: 58.4 %
NT-PROBNP SERPL-MCNC: 279 PG/ML (ref ?–450)
OSMOLALITY SERPL CALC.SUM OF ELEC: 296 MOSM/KG (ref 275–295)
PLATELET # BLD AUTO: 222 10(3)UL (ref 150–450)
POTASSIUM SERPL-SCNC: 4.6 MMOL/L (ref 3.5–5.1)
RBC # BLD AUTO: 3.64 X10(6)UL (ref 3.8–5.3)
SODIUM SERPL-SCNC: 141 MMOL/L (ref 136–145)
WBC # BLD AUTO: 8.5 X10(3) UL (ref 4–11)

## 2020-07-27 PROCEDURE — 36415 COLL VENOUS BLD VENIPUNCTURE: CPT

## 2020-07-27 PROCEDURE — 83880 ASSAY OF NATRIURETIC PEPTIDE: CPT | Performed by: EMERGENCY MEDICINE

## 2020-07-27 PROCEDURE — 80048 BASIC METABOLIC PNL TOTAL CA: CPT | Performed by: EMERGENCY MEDICINE

## 2020-07-27 PROCEDURE — 99284 EMERGENCY DEPT VISIT MOD MDM: CPT

## 2020-07-27 PROCEDURE — 85025 COMPLETE CBC W/AUTO DIFF WBC: CPT | Performed by: EMERGENCY MEDICINE

## 2020-07-27 PROCEDURE — 93970 EXTREMITY STUDY: CPT | Performed by: EMERGENCY MEDICINE

## 2020-07-27 RX ORDER — SULFAMETHOXAZOLE AND TRIMETHOPRIM 800; 160 MG/1; MG/1
1 TABLET ORAL 2 TIMES DAILY
Qty: 14 TABLET | Refills: 0 | Status: SHIPPED | OUTPATIENT
Start: 2020-07-27 | End: 2020-08-03

## 2020-07-27 NOTE — ED PROVIDER NOTES
Patient Seen in: [facility] AND [facility] Emergency Department      History   Patient presents with:  Swelling    Stated Complaint: Bilateral leg swelling x4days    HPI    80-year-old female presents to the ER with worsening bilateral lower extremity swelling. Performed by Lori Deleon MD at [address] [place] [address]   • FOOT SURGERY Right    • GENICULAR NERVE BLOCK Right 1/8/2018    Performed by Lennice Felty, MD at [address] [place] [address]   • LUCIA LOCALIZATION WIRE 1 SITE LEFT (CPT=19281)     • OTHER S edema of the right lower extremity with positive macular blanching erythema. 2+ edema of the left lower extremity. Skin:     General: Skin is warm. Capillary Refill: Capillary refill takes less than 2 seconds.    Neurological:      General: No focal Plan     Clinical Impression:  Cellulitis of leg, right  (primary encounter diagnosis)  Localized swelling of both lower extremities    Disposition:  Discharge  7/27/2020  1:27 pm    Follow-up:        If symptoms worsen    Ludmila Chand MD  [address]

## 2020-07-27 NOTE — ED INITIAL ASSESSMENT (HPI)
Hamlet Pizano c/o bilateral lower extremity swelling and right lower extremity redness for this past week.

## 2020-08-03 RX ORDER — ATORVASTATIN CALCIUM 10 MG/1
TABLET, FILM COATED ORAL
Qty: 30 TABLET | Refills: 0 | OUTPATIENT
Start: 2020-08-03

## 2020-08-28 NOTE — TELEPHONE ENCOUNTER
Spoke with Deborah Potts who stated patient was to see  then f/u with our office. Patient saw Lynda Mendez on 6/18/2020 who stated: \"Unsure of cause of clear oral discharge. Normal salivary function on examination.   Unlikely that this is CSF with

## 2020-08-31 NOTE — TELEPHONE ENCOUNTER
Please add to list for Dr. Alan Garcia questions to address for Thursday, if he'd like to see her in the office or phone visit    Thank you

## 2020-10-01 RX ORDER — ATORVASTATIN CALCIUM 10 MG/1
10 TABLET, FILM COATED ORAL NIGHTLY
Qty: 90 TABLET | Refills: 1 | OUTPATIENT
Start: 2020-10-01

## 2020-10-08 NOTE — TELEPHONE ENCOUNTER
Per [facility] ok for phone appointment. Will forward message to front office to reach out to patient to schedule a televisit to review MRI results/plan.

## 2020-10-22 ENCOUNTER — VIRTUAL PHONE E/M (OUTPATIENT)
Dept: SURGERY | Facility: CLINIC | Age: 80
End: 2020-10-22
Payer: MEDICARE

## 2020-10-22 DIAGNOSIS — K11.7 SIALORRHEA: Primary | ICD-10-CM

## 2020-10-22 PROCEDURE — 99441 PHONE E/M BY PHYS 5-10 MIN: CPT | Performed by: NEUROLOGICAL SURGERY

## 2020-10-26 PROBLEM — J18.9 PNEUMONIA OF RIGHT LUNG DUE TO INFECTIOUS ORGANISM, UNSPECIFIED PART OF LUNG: Status: RESOLVED | Noted: 2018-01-24 | Resolved: 2020-10-26

## 2020-12-13 ENCOUNTER — HOSPITAL ENCOUNTER (OUTPATIENT)
Facility: HOSPITAL | Age: 80
Setting detail: OBSERVATION
Discharge: HOME | End: 2020-12-14
Attending: EMERGENCY MEDICINE | Admitting: HOSPITALIST
Payer: MEDICARE

## 2020-12-13 ENCOUNTER — APPOINTMENT (OUTPATIENT)
Dept: CT IMAGING | Facility: HOSPITAL | Age: 80
End: 2020-12-13
Attending: EMERGENCY MEDICINE
Payer: MEDICARE

## 2020-12-13 DIAGNOSIS — R53.1 WEAKNESS GENERALIZED: ICD-10-CM

## 2020-12-13 DIAGNOSIS — N28.9 RENAL INSUFFICIENCY: Primary | ICD-10-CM

## 2020-12-13 PROCEDURE — 80178 ASSAY OF LITHIUM: CPT | Performed by: EMERGENCY MEDICINE

## 2020-12-13 PROCEDURE — 96360 HYDRATION IV INFUSION INIT: CPT

## 2020-12-13 PROCEDURE — 84443 ASSAY THYROID STIM HORMONE: CPT | Performed by: EMERGENCY MEDICINE

## 2020-12-13 PROCEDURE — 70450 CT HEAD/BRAIN W/O DYE: CPT | Performed by: EMERGENCY MEDICINE

## 2020-12-13 PROCEDURE — 85025 COMPLETE CBC W/AUTO DIFF WBC: CPT | Performed by: EMERGENCY MEDICINE

## 2020-12-13 PROCEDURE — 80053 COMPREHEN METABOLIC PANEL: CPT | Performed by: EMERGENCY MEDICINE

## 2020-12-13 PROCEDURE — 96361 HYDRATE IV INFUSION ADD-ON: CPT

## 2020-12-13 PROCEDURE — 87086 URINE CULTURE/COLONY COUNT: CPT | Performed by: EMERGENCY MEDICINE

## 2020-12-13 PROCEDURE — 84439 ASSAY OF FREE THYROXINE: CPT | Performed by: EMERGENCY MEDICINE

## 2020-12-13 PROCEDURE — 99285 EMERGENCY DEPT VISIT HI MDM: CPT

## 2020-12-13 PROCEDURE — 81003 URINALYSIS AUTO W/O SCOPE: CPT | Performed by: EMERGENCY MEDICINE

## 2020-12-13 PROCEDURE — 96372 THER/PROPH/DIAG INJ SC/IM: CPT

## 2020-12-13 RX ORDER — ACETAMINOPHEN 325 MG/1
650 TABLET ORAL EVERY 6 HOURS PRN
Status: DISCONTINUED | OUTPATIENT
Start: 2020-12-13 | End: 2020-12-14

## 2020-12-13 RX ORDER — LITHIUM CARBONATE 300 MG/1
300 TABLET, FILM COATED, EXTENDED RELEASE ORAL DAILY
Status: DISCONTINUED | OUTPATIENT
Start: 2020-12-13 | End: 2020-12-14

## 2020-12-13 RX ORDER — HEPARIN SODIUM 5000 [USP'U]/ML
5000 INJECTION, SOLUTION INTRAVENOUS; SUBCUTANEOUS EVERY 8 HOURS SCHEDULED
Status: DISCONTINUED | OUTPATIENT
Start: 2020-12-13 | End: 2020-12-14

## 2020-12-13 RX ORDER — SODIUM CHLORIDE/ALOE VERA
1 GEL (GRAM) NASAL AS NEEDED
COMMUNITY

## 2020-12-13 RX ORDER — DOCUSATE SODIUM 100 MG/1
100 CAPSULE, LIQUID FILLED ORAL 2 TIMES DAILY PRN
Status: DISCONTINUED | OUTPATIENT
Start: 2020-12-13 | End: 2020-12-14

## 2020-12-13 RX ORDER — BISACODYL 10 MG
10 SUPPOSITORY, RECTAL RECTAL
Status: DISCONTINUED | OUTPATIENT
Start: 2020-12-13 | End: 2020-12-14

## 2020-12-13 RX ORDER — ASPIRIN 81 MG/1
81 TABLET ORAL DAILY
Status: DISCONTINUED | OUTPATIENT
Start: 2020-12-14 | End: 2020-12-14

## 2020-12-13 RX ORDER — LEVOTHYROXINE SODIUM 0.07 MG/1
75 TABLET ORAL DAILY
Status: DISCONTINUED | OUTPATIENT
Start: 2020-12-13 | End: 2020-12-14

## 2020-12-13 RX ORDER — AMITRIPTYLINE HYDROCHLORIDE 25 MG/1
25 TABLET, FILM COATED ORAL NIGHTLY
Status: DISCONTINUED | OUTPATIENT
Start: 2020-12-13 | End: 2020-12-14

## 2020-12-13 RX ORDER — ASPIRIN 325 MG
650 TABLET ORAL DAILY
Status: DISCONTINUED | OUTPATIENT
Start: 2020-12-13 | End: 2020-12-13

## 2020-12-13 RX ORDER — CEPHALEXIN 250 MG/1
250 CAPSULE ORAL EVERY EVENING
Status: DISCONTINUED | OUTPATIENT
Start: 2020-12-13 | End: 2020-12-14

## 2020-12-13 RX ORDER — NYSTATIN 100000 U/G
CREAM TOPICAL 2 TIMES DAILY
Status: DISCONTINUED | OUTPATIENT
Start: 2020-12-13 | End: 2020-12-14

## 2020-12-13 RX ORDER — POLYETHYLENE GLYCOL 3350 17 G/17G
17 POWDER, FOR SOLUTION ORAL DAILY PRN
Status: DISCONTINUED | OUTPATIENT
Start: 2020-12-13 | End: 2020-12-14

## 2020-12-13 RX ORDER — ATORVASTATIN CALCIUM 10 MG/1
10 TABLET, FILM COATED ORAL DAILY
Status: DISCONTINUED | OUTPATIENT
Start: 2020-12-14 | End: 2020-12-14

## 2020-12-13 RX ORDER — SODIUM CHLORIDE 9 MG/ML
INJECTION, SOLUTION INTRAVENOUS CONTINUOUS
Status: ACTIVE | OUTPATIENT
Start: 2020-12-13 | End: 2020-12-13

## 2020-12-13 RX ORDER — HYDROCODONE BITARTRATE AND ACETAMINOPHEN 10; 325 MG/1; MG/1
1 TABLET ORAL EVERY 6 HOURS PRN
Status: DISCONTINUED | OUTPATIENT
Start: 2020-12-13 | End: 2020-12-14

## 2020-12-13 RX ORDER — SODIUM CHLORIDE 9 MG/ML
INJECTION, SOLUTION INTRAVENOUS CONTINUOUS
Status: ACTIVE | OUTPATIENT
Start: 2020-12-13 | End: 2020-12-14

## 2020-12-13 RX ORDER — MELATONIN
325 DAILY
Status: DISCONTINUED | OUTPATIENT
Start: 2020-12-13 | End: 2020-12-14

## 2020-12-13 RX ORDER — ALPRAZOLAM 0.5 MG/1
0.5 TABLET ORAL NIGHTLY PRN
Status: DISCONTINUED | OUTPATIENT
Start: 2020-12-13 | End: 2020-12-14

## 2020-12-13 NOTE — ED INITIAL ASSESSMENT (HPI)
Per family patient has hx of utis, states she has had \"cognitive impairment\" for the past week, patient a&ox2 in triage

## 2020-12-13 NOTE — ED PROVIDER NOTES
Patient Seen in: [facility] AND [facility] Emergency Department    History   Patient presents with:  Weakness    Stated Complaint: weakness     HPI    80-year-old female with past medical history of anxiety, bipolar on lithium, depression, hypothyroid, hypertensio 2011   • COLONOSCOPY     • COLONOSCOPY N/A 4/1/2019    Performed by Jose Luis Hoffmann MD at [facility] ENDOSCOPY   • ESOPHAGOGASTRODUODENOSCOPY, POSSIBLE BIOPSY, POSSIBLE POLYPECTOMY 21318 N/A 1/7/2020    Performed by Uri Redman MD at [facility], Family History   Problem Relation Age of Onset   • Breast Cancer Mother 61   • Breast Cancer Maternal Aunt        Social History    Tobacco Use      Smoking status: Never Smoker      Smokeless tobacco: Never Used    Alcohol use: Yes      Frequency: Month -American 23 (*)     ALT 11 (*)     AST 10 (*)     Albumin 3.1 (*)     A/G Ratio 0.9 (*)     All other components within normal limits   TSH W REFLEX TO FREE T4 - Abnormal; Notable for the following components:    TSH 4.450 (*)     All other compone or cortical sulcal effacement is apparent. There is no space-occupying lesion, mass effect, or shift of midline structures. The gray-white matter junction is preserved and bilaterally symmetric in appearance.  CEREBELLUM: No edema, hemorrhage, mass, or acut admission vs placement; will update coverage Dr. Luis A Wells for admission.     I was wearing at minimum a facemask and eye protection throughout this encounter with handwashing performed prior and after patient evaluation without personal hand/facial/oropharyng

## 2020-12-14 VITALS
WEIGHT: 236.88 LBS | HEIGHT: 67 IN | SYSTOLIC BLOOD PRESSURE: 125 MMHG | OXYGEN SATURATION: 100 % | RESPIRATION RATE: 20 BRPM | BODY MASS INDEX: 37.18 KG/M2 | DIASTOLIC BLOOD PRESSURE: 58 MMHG | HEART RATE: 69 BPM | TEMPERATURE: 98 F

## 2020-12-14 PROCEDURE — 85025 COMPLETE CBC W/AUTO DIFF WBC: CPT | Performed by: STUDENT IN AN ORGANIZED HEALTH CARE EDUCATION/TRAINING PROGRAM

## 2020-12-14 PROCEDURE — 80048 BASIC METABOLIC PNL TOTAL CA: CPT | Performed by: STUDENT IN AN ORGANIZED HEALTH CARE EDUCATION/TRAINING PROGRAM

## 2020-12-14 PROCEDURE — 93005 ELECTROCARDIOGRAM TRACING: CPT

## 2020-12-14 PROCEDURE — 97116 GAIT TRAINING THERAPY: CPT

## 2020-12-14 PROCEDURE — 93010 ELECTROCARDIOGRAM REPORT: CPT | Performed by: STUDENT IN AN ORGANIZED HEALTH CARE EDUCATION/TRAINING PROGRAM

## 2020-12-14 PROCEDURE — 97161 PT EVAL LOW COMPLEX 20 MIN: CPT

## 2020-12-14 PROCEDURE — 83970 ASSAY OF PARATHORMONE: CPT | Performed by: STUDENT IN AN ORGANIZED HEALTH CARE EDUCATION/TRAINING PROGRAM

## 2020-12-14 PROCEDURE — 96372 THER/PROPH/DIAG INJ SC/IM: CPT

## 2020-12-14 NOTE — CM/SW NOTE
The pt was able to ambulate 3 steps with 2 assists and the use of a walker. The pt states she is weak and does not know why she cannot ambulate. The pt stated that she had home PT 3 weeks ago and was doing well.     Referrals sent to 12 rehab facilities thr

## 2020-12-14 NOTE — HOME CARE LIAISON
Received referral from SAUMYA/Angelia for [facility]. Per MARILYM/Josephine's notes, pt had PT 3 weeks ago. Confirmed w/ pt's brother/Donis that pt is current w/ [facility].

## 2020-12-14 NOTE — H&P
[facility] Hospitalist H&P       CC: weakness     PCP: Claire Bullock MD    History of Present Illness:   [de-identified]year old female with history of CKD stage 4, CAD, HTN, hypothyroidism, depression, anxiety, who presents to the hospital for generalized weakness.  Broth Oral Tab, TAKE ONE TABLET BY MOUTH ONE TIME DAILY , Disp: 90 tablet, Rfl: 2    •  Vitamin B-12 1000 MCG Oral Tab, Take 1,000 mcg by mouth daily. , Disp: , Rfl:     •  Ferrous Sulfate (IRON) 325 (65 Fe) MG Oral Tab, Take 1 tablet by mouth daily.   , Disp: , R Neck: non tender, no adenopathy   Lungs: clear to ausculation bilaterally, no wheezing  Heart: Regular rate and rhythm  Abdomen: soft, non tender, non distended   Extremities: No edema  Skin: no new rash, normal color  Neuro: CN II-XII intact, 5/5 streng

## 2020-12-14 NOTE — PLAN OF CARE
Problem: Patient Centered Care  Goal: Patient preferences are identified and integrated in the patient's plan of care  Description: Interventions:  - What would you like us to know as we care for you?  \"I'm from independent living\"  - Provide timely, co Manage/alleviate anxiety  - Utilize distraction and/or relaxation techniques  - Monitor for opioid side effects  - Notify MD/LIP if interventions unsuccessful or patient reports new pain  - Anticipate increased pain with activity and pre-medicate as approp services based on physician/LIP order or complex needs related to functional status, cognitive ability or social support system  Outcome: Adequate for Discharge     Problem: SKIN/TISSUE INTEGRITY - ADULT  Goal: Skin integrity remains intact  Description: I

## 2020-12-14 NOTE — PROGRESS NOTES
Patient has discharge order placed, patient understands to follow up with primary care provider in one week, discharge education complete, all questions answered, patient's brother, Sony Costello, was at the bedside. IV to be removed by primary RN.

## 2020-12-14 NOTE — CM/SW NOTE
Admitted fron ED with dehydration. Per ED CM pt/ family are declining rehab, are agreeable to [facility]. Ref given to [facility] to eval for services.     36  Pt is current w/ [facility], GAETANO orders sent,notified of dc via aidin    /

## 2020-12-14 NOTE — PLAN OF CARE
Problem: Patient Centered Care  Goal: Patient preferences are identified and integrated in the patient's plan of care  Description: Interventions:  - What would you like us to know as we care for you?  \"I'm from independent living\"  - Provide timely, co distraction and/or relaxation techniques  - Monitor for opioid side effects  - Notify MD/LIP if interventions unsuccessful or patient reports new pain  - Anticipate increased pain with activity and pre-medicate as appropriate  Outcome: Progressing     Prob functional status, cognitive ability or social support system  Outcome: Progressing       Patient admitted today for observation, history of UTI and renal insufficiency. Patient's POA (brother) was here to assist with admission questions.  Patient oriented

## 2020-12-14 NOTE — ED NOTES
Orders for admission, patient is aware of plan and ready to go upstairs. Any questions, please call ED RN LETICIA POWERS [facility]  at [phone]. Type of COVID test sent: Rapid- negative    LOC at time of transport: Alert and oriented x3.      Other pertinent informat

## 2020-12-14 NOTE — DISCHARGE SUMMARY
General Medicine Discharge Summary     Patient ID:  Ulices Torres  [de-identified]year old  2/16/1940    Admit date: 12/13/2020    Discharge date and time: 12/14/20    Attending Physician: DO Kia Ewing TABLET BY MOUTH ONE TIME DAILY     Vitamin B-12 1000 MCG Oral Tab  Take 1,000 mcg by mouth daily. Ferrous Sulfate (IRON) 325 (65 Fe) MG Oral Tab  Take 1 tablet by mouth daily. aspirin 81 MG Oral Tab  Take 650 mg by mouth daily.       docusate sodium

## 2020-12-14 NOTE — PHYSICAL THERAPY NOTE
PHYSICAL THERAPY EVALUATION - INPATIENT     Room Number: 558/558-A  Evaluation Date: 12/14/2020  Type of Evaluation: Initial   Physician Order: PT Eval and Treat    Presenting Problem: Renal insurriciency  Reason for Therapy: Mobility Dysfunction and Disc Potential : Good  Frequency (Obs): 3-5x/week       PHYSICAL THERAPY MEDICAL/SOCIAL HISTORY     History related to current admission: weakness Renal insuffiencey      Problem List  Principal Problem:    Renal insufficiency  Active Problems:    Weakness gene No     Patient Regularly Uses: Glasses    Prior Level of [place]: Mod indep with a RW for all ADL's and mobility in an indep living apartment. Does not drive.      SUBJECTIVE  I feel weaker than my normal but I can take a few steps with help and using Lot     AM-PAC Score:  Raw Score: 16   Approx Degree of Impairment: 54.16%   Standardized Score (AM-PAC Scale): 40.78   CMS Modifier (G-Code): CK    FUNCTIONAL ABILITY STATUS  Gait Assessment   Gait Assistance: Minimum assistance  Distance (ft): 10  Assist

## 2021-02-10 ENCOUNTER — HOSPITAL ENCOUNTER (INPATIENT)
Facility: HOSPITAL | Age: 81
LOS: 4 days | Discharge: NURSING, REHAB OR LONG-TERM CARE | DRG: 683 | End: 2021-02-14
Attending: EMERGENCY MEDICINE | Admitting: HOSPITALIST
Payer: MEDICARE

## 2021-02-10 DIAGNOSIS — N28.9 RENAL INSUFFICIENCY: ICD-10-CM

## 2021-02-10 DIAGNOSIS — E03.9 ACQUIRED HYPOTHYROIDISM: ICD-10-CM

## 2021-02-10 DIAGNOSIS — R79.89 ELEVATED LITHIUM LEVEL: ICD-10-CM

## 2021-02-10 DIAGNOSIS — R53.1 WEAKNESS GENERALIZED: Primary | ICD-10-CM

## 2021-02-10 PROBLEM — E87.2 METABOLIC ACIDOSIS: Status: ACTIVE | Noted: 2021-02-10

## 2021-02-10 LAB
ALBUMIN SERPL-MCNC: 3.3 G/DL (ref 3.4–5)
ALP LIVER SERPL-CCNC: 120 U/L
ALT SERPL-CCNC: 10 U/L
ANION GAP SERPL CALC-SCNC: 7 MMOL/L (ref 0–18)
AST SERPL-CCNC: 9 U/L (ref 15–37)
BASOPHILS # BLD AUTO: 0.02 X10(3) UL (ref 0–0.2)
BASOPHILS NFR BLD AUTO: 0.2 %
BILIRUB DIRECT SERPL-MCNC: 0.1 MG/DL (ref 0–0.2)
BILIRUB SERPL-MCNC: 0.3 MG/DL (ref 0.1–2)
BILIRUB UR QL: NEGATIVE
BUN BLD-MCNC: 17 MG/DL (ref 7–18)
BUN/CREAT SERPL: 6.7 (ref 10–20)
CALCIUM BLD-MCNC: 9 MG/DL (ref 8.5–10.1)
CHLORIDE SERPL-SCNC: 115 MMOL/L (ref 98–112)
CLARITY UR: CLEAR
CO2 SERPL-SCNC: 20 MMOL/L (ref 21–32)
COLOR UR: YELLOW
CREAT BLD-MCNC: 2.52 MG/DL
DEPRECATED RDW RBC AUTO: 49.8 FL (ref 35.1–46.3)
EOSINOPHIL # BLD AUTO: 0.5 X10(3) UL (ref 0–0.7)
EOSINOPHIL NFR BLD AUTO: 6 %
ERYTHROCYTE [DISTWIDTH] IN BLOOD BY AUTOMATED COUNT: 12.6 % (ref 11–15)
GLUCOSE BLD-MCNC: 105 MG/DL (ref 70–99)
GLUCOSE UR-MCNC: NEGATIVE MG/DL
HCT VFR BLD AUTO: 40.2 %
HGB BLD-MCNC: 11.8 G/DL
IMM GRANULOCYTES # BLD AUTO: 0.02 X10(3) UL (ref 0–1)
IMM GRANULOCYTES NFR BLD: 0.2 %
KETONES UR-MCNC: NEGATIVE MG/DL
LEUKOCYTE ESTERASE UR QL STRIP.AUTO: NEGATIVE
LITHIUM SERPL-SCNC: 1.4 MMOL/L (ref 0.6–1.2)
LYMPHOCYTES # BLD AUTO: 2.27 X10(3) UL (ref 1–4)
LYMPHOCYTES NFR BLD AUTO: 27.3 %
M PROTEIN MFR SERPL ELPH: 6.8 G/DL (ref 6.4–8.2)
MCH RBC QN AUTO: 31.5 PG (ref 26–34)
MCHC RBC AUTO-ENTMCNC: 29.4 G/DL (ref 31–37)
MCV RBC AUTO: 107.2 FL
MONOCYTES # BLD AUTO: 0.56 X10(3) UL (ref 0.1–1)
MONOCYTES NFR BLD AUTO: 6.7 %
NEUTROPHILS # BLD AUTO: 4.93 X10 (3) UL (ref 1.5–7.7)
NEUTROPHILS # BLD AUTO: 4.93 X10(3) UL (ref 1.5–7.7)
NEUTROPHILS NFR BLD AUTO: 59.6 %
NITRITE UR QL STRIP.AUTO: NEGATIVE
OSMOLALITY SERPL CALC.SUM OF ELEC: 296 MOSM/KG (ref 275–295)
PH UR: 6 [PH] (ref 5–8)
PLATELET # BLD AUTO: 227 10(3)UL (ref 150–450)
POTASSIUM SERPL-SCNC: 4.2 MMOL/L (ref 3.5–5.1)
PROT UR-MCNC: NEGATIVE MG/DL
RBC # BLD AUTO: 3.75 X10(6)UL
RBC #/AREA URNS AUTO: 0 /HPF
SARS-COV-2 RNA RESP QL NAA+PROBE: NOT DETECTED
SODIUM SERPL-SCNC: 142 MMOL/L (ref 136–145)
SP GR UR STRIP: 1 (ref 1–1.03)
T4 FREE SERPL-MCNC: 0.8 NG/DL (ref 0.8–1.7)
TSI SER-ACNC: 8.58 MIU/ML (ref 0.36–3.74)
UROBILINOGEN UR STRIP-ACNC: <2
WBC # BLD AUTO: 8.3 X10(3) UL (ref 4–11)
WBC #/AREA URNS AUTO: 1 /HPF

## 2021-02-10 RX ORDER — LEVOTHYROXINE SODIUM 0.07 MG/1
75 TABLET ORAL
Status: DISCONTINUED | OUTPATIENT
Start: 2021-02-11 | End: 2021-02-11

## 2021-02-10 RX ORDER — POLYETHYLENE GLYCOL 3350 17 G/17G
17 POWDER, FOR SOLUTION ORAL DAILY PRN
Status: DISCONTINUED | OUTPATIENT
Start: 2021-02-10 | End: 2021-02-14

## 2021-02-10 RX ORDER — SODIUM CHLORIDE 9 MG/ML
INJECTION, SOLUTION INTRAVENOUS CONTINUOUS
Status: DISCONTINUED | OUTPATIENT
Start: 2021-02-10 | End: 2021-02-11

## 2021-02-10 RX ORDER — MELATONIN
325 DAILY
Status: DISCONTINUED | OUTPATIENT
Start: 2021-02-11 | End: 2021-02-14

## 2021-02-10 RX ORDER — DOCUSATE SODIUM 100 MG/1
100 CAPSULE, LIQUID FILLED ORAL 2 TIMES DAILY PRN
Status: DISCONTINUED | OUTPATIENT
Start: 2021-02-10 | End: 2021-02-14

## 2021-02-10 RX ORDER — CHOLECALCIFEROL (VITAMIN D3) 125 MCG
1000 CAPSULE ORAL DAILY
Status: DISCONTINUED | OUTPATIENT
Start: 2021-02-11 | End: 2021-02-14

## 2021-02-10 RX ORDER — BISACODYL 10 MG
10 SUPPOSITORY, RECTAL RECTAL
Status: DISCONTINUED | OUTPATIENT
Start: 2021-02-10 | End: 2021-02-14

## 2021-02-10 RX ORDER — ATORVASTATIN CALCIUM 10 MG/1
10 TABLET, FILM COATED ORAL NIGHTLY
Status: DISCONTINUED | OUTPATIENT
Start: 2021-02-11 | End: 2021-02-14

## 2021-02-10 RX ORDER — HEPARIN SODIUM 5000 [USP'U]/ML
5000 INJECTION, SOLUTION INTRAVENOUS; SUBCUTANEOUS EVERY 8 HOURS SCHEDULED
Status: DISCONTINUED | OUTPATIENT
Start: 2021-02-11 | End: 2021-02-14

## 2021-02-10 RX ORDER — ALPRAZOLAM 0.5 MG/1
0.5 TABLET ORAL NIGHTLY PRN
Status: DISCONTINUED | OUTPATIENT
Start: 2021-02-10 | End: 2021-02-14

## 2021-02-10 RX ORDER — ONDANSETRON 2 MG/ML
4 INJECTION INTRAMUSCULAR; INTRAVENOUS EVERY 6 HOURS PRN
Status: DISCONTINUED | OUTPATIENT
Start: 2021-02-10 | End: 2021-02-14

## 2021-02-10 RX ORDER — METOCLOPRAMIDE HYDROCHLORIDE 5 MG/ML
5 INJECTION INTRAMUSCULAR; INTRAVENOUS EVERY 8 HOURS PRN
Status: DISCONTINUED | OUTPATIENT
Start: 2021-02-10 | End: 2021-02-14

## 2021-02-10 RX ORDER — ACETAMINOPHEN 325 MG/1
650 TABLET ORAL EVERY 6 HOURS PRN
Status: DISCONTINUED | OUTPATIENT
Start: 2021-02-10 | End: 2021-02-14

## 2021-02-10 RX ORDER — AMITRIPTYLINE HYDROCHLORIDE 25 MG/1
50 TABLET, FILM COATED ORAL NIGHTLY
Status: DISCONTINUED | OUTPATIENT
Start: 2021-02-11 | End: 2021-02-14

## 2021-02-10 RX ORDER — CEPHALEXIN 250 MG/1
250 CAPSULE ORAL EVERY EVENING
Status: DISCONTINUED | OUTPATIENT
Start: 2021-02-11 | End: 2021-02-14

## 2021-02-10 RX ORDER — ASPIRIN 325 MG
325 TABLET ORAL DAILY
Status: DISCONTINUED | OUTPATIENT
Start: 2021-02-11 | End: 2021-02-14

## 2021-02-10 RX ORDER — HYDROCODONE BITARTRATE AND ACETAMINOPHEN 10; 325 MG/1; MG/1
2 TABLET ORAL EVERY 6 HOURS PRN
Status: DISCONTINUED | OUTPATIENT
Start: 2021-02-10 | End: 2021-02-14

## 2021-02-10 NOTE — ED INITIAL ASSESSMENT (HPI)
Patient presents with brother in wheelchair from home. Patient notes she thinks she is dehydrated. Answering all questions appropriately. Patient notes she normally walks with a walker but was unable to walk today due to weakness.  brother notes after d

## 2021-02-11 PROBLEM — T56.891A: Status: ACTIVE | Noted: 2021-02-11

## 2021-02-11 LAB
ALBUMIN SERPL-MCNC: 3 G/DL (ref 3.4–5)
ALBUMIN/GLOB SERPL: 0.9 {RATIO} (ref 1–2)
ALP LIVER SERPL-CCNC: 115 U/L
ALT SERPL-CCNC: 9 U/L
ANION GAP SERPL CALC-SCNC: 5 MMOL/L (ref 0–18)
AST SERPL-CCNC: 6 U/L (ref 15–37)
BILIRUB SERPL-MCNC: 0.6 MG/DL (ref 0.1–2)
BUN BLD-MCNC: 18 MG/DL (ref 7–18)
BUN/CREAT SERPL: 7.8 (ref 10–20)
CALCIUM BLD-MCNC: 9.1 MG/DL (ref 8.5–10.1)
CHLORIDE SERPL-SCNC: 122 MMOL/L (ref 98–112)
CO2 SERPL-SCNC: 19 MMOL/L (ref 21–32)
CREAT BLD-MCNC: 2.3 MG/DL
GLOBULIN PLAS-MCNC: 3.2 G/DL (ref 2.8–4.4)
GLUCOSE BLD-MCNC: 90 MG/DL (ref 70–99)
M PROTEIN MFR SERPL ELPH: 6.2 G/DL (ref 6.4–8.2)
OSMOLALITY SERPL CALC.SUM OF ELEC: 303 MOSM/KG (ref 275–295)
POTASSIUM SERPL-SCNC: 4 MMOL/L (ref 3.5–5.1)
SODIUM SERPL-SCNC: 146 MMOL/L (ref 136–145)
T4 FREE SERPL-MCNC: 0.8 NG/DL (ref 0.8–1.7)
TSI SER-ACNC: 7.75 MIU/ML (ref 0.36–3.74)

## 2021-02-11 PROCEDURE — 90792 PSYCH DIAG EVAL W/MED SRVCS: CPT | Performed by: OTHER

## 2021-02-11 RX ORDER — LITHIUM CARBONATE 150 MG/1
150 CAPSULE ORAL 2 TIMES DAILY WITH MEALS
Status: DISCONTINUED | OUTPATIENT
Start: 2021-02-11 | End: 2021-02-13

## 2021-02-11 RX ORDER — LEVOTHYROXINE SODIUM 0.1 MG/1
100 TABLET ORAL
Status: DISCONTINUED | OUTPATIENT
Start: 2021-02-12 | End: 2021-02-14

## 2021-02-11 RX ORDER — SODIUM CHLORIDE 9 MG/ML
INJECTION, SOLUTION INTRAVENOUS CONTINUOUS
Status: ACTIVE | OUTPATIENT
Start: 2021-02-11 | End: 2021-02-11

## 2021-02-11 NOTE — CM/SW NOTE
[facility] CM confirmed with [facility] pt is current w/ RN/PT services. They are available to continue services at [place]. Ref sent w/ clinical updates and GAETANO order. 2/12 1140  MDO to discuss KATY w/ pt and send ref.     CM s

## 2021-02-11 NOTE — PHYSICAL THERAPY NOTE
PHYSICAL THERAPY EVALUATION - INPATIENT     Room Number: 561/561-A  Evaluation Date: 2/11/2021  Type of Evaluation: Initial   Physician Order: PT Eval and Treat    Presenting Problem: weakness, dehydration  Reason for Therapy: Mobility Dysfunction and Dis discharge. DISCHARGE RECOMMENDATIONS  PT Discharge Recommendations: Sub-acute rehabilitation    PLAN  PT Treatment Plan: Bed mobility; Body mechanics; Endurance; Energy conservation;Patient education;Gait training;Neuromuscular re-educate;Strengthening;Tra ESOPHAGOGASTRODUODENOSCOPY, POSSIBLE BIOPSY, POSSIBLE POLYPECTOMY 87093 N/A 1/7/2020    Performed by Maddie Martinez MD at [facility]   • FOOT SURGERY Right    • GENICULAR NERVE BLOCK Right 1/8/2018    Performed by Marilynn Gonzalez MD at [facility] help from another person does the patient currently need. ..   -   Moving to and from a bed to a chair (including a wheelchair)?: A Lot   -   Need to walk in hospital room?: Total   -   Climbing 3-5 steps with a railing?: Total     AM-PAC Score:  Raw Score:

## 2021-02-11 NOTE — ED PROVIDER NOTES
Patient Seen in: [facility] 5sw/se    History   Patient presents with:  Dehydration    Stated Complaint: Difficulty ambulating    HPI    Patient complains of weakness. Not drinking. Dry mouth. No fever. No abd pain. toms 2 months ago was admitte tablet (10 mg total) by mouth nightly. ayr saline nasal Nasal Gel,  1 Application by Nasal route as needed. cephALEXin 250 MG Oral Cap,  Take 1 capsule (250 mg total) by mouth every evening.    LEVOTHYROXINE SODIUM 75 MCG Oral Tab,  TAKE ONE TABLET BY M use: No      Review of Systems    Positive for stated complaint: Difficulty ambulating  Other systems are as noted in HPI. Constitutional and vital signs reviewed. All other systems reviewed and negative except as noted above.     PSFH elements review components within normal limits   URINALYSIS WITH CULTURE REFLEX - Abnormal; Notable for the following components:    Blood Urine Small (*)     Bacteria Urine Few (*)     All other components within normal limits   TSH W REFLEX TO FREE T4 - Abnormal; Notab DIFFICILE(TOXIGENIC)PCR     EKG    Rate, intervals and axes as noted on EKG Report.   Rate: 68  Rhythm: Sinus Rhythm  Reading: Normal intervals, normal EKG             [facility]       Cardiac Monitor: Pulse Readings from Last 1 Encounters:  02/11/21 : 62  , sinus

## 2021-02-11 NOTE — ED NOTES
Orders for admission, patient is aware of plan and ready to go upstairs. Any questions, please call ED RN Emily Sommer  at extension [phone].    Type of COVID test sent:Rapid  COVID Suspicion level: Negative    Titratable drug(s) infusing:N/A  Rate:    LOC at time

## 2021-02-11 NOTE — CM/SW NOTE
Spoke with patient and patient's brother Abiola Erik regarding currently situation at home. Patient stated that usually she can walk with her walker but today she could not walk.   According to her brother Abiola Valencia, this happened in 12/21 and patient was admitt

## 2021-02-11 NOTE — PLAN OF CARE
Problem: Patient Centered Care  Goal: Patient preferences are identified and integrated in the patient's plan of care  Description: Interventions:  - What would you like us to know as we care for you?  From home alone  - Provide timely, complete, and accu algorithm/standards of care as needed  Outcome: Progressing     Problem: MUSCULOSKELETAL - ADULT  Goal: Return mobility to safest level of function  Description: INTERVENTIONS:  - Assess patient stability and activity tolerance for standing, transferring a Progressing     Problem: SAFETY ADULT - FALL  Goal: Free from fall injury  Description: INTERVENTIONS:  - Assess pt frequently for physical needs  - Identify cognitive and physical deficits and behaviors that affect risk of falls.   - [place] fall precaut

## 2021-02-12 LAB
ANION GAP SERPL CALC-SCNC: 7 MMOL/L (ref 0–18)
BASOPHILS # BLD AUTO: 0.03 X10(3) UL (ref 0–0.2)
BASOPHILS NFR BLD AUTO: 0.5 %
BUN BLD-MCNC: 19 MG/DL (ref 7–18)
BUN/CREAT SERPL: 9 (ref 10–20)
CALCIUM BLD-MCNC: 8.6 MG/DL (ref 8.5–10.1)
CHLORIDE SERPL-SCNC: 122 MMOL/L (ref 98–112)
CO2 SERPL-SCNC: 18 MMOL/L (ref 21–32)
CREAT BLD-MCNC: 2.11 MG/DL
DEPRECATED RDW RBC AUTO: 50.2 FL (ref 35.1–46.3)
EOSINOPHIL # BLD AUTO: 0.51 X10(3) UL (ref 0–0.7)
EOSINOPHIL NFR BLD AUTO: 7.9 %
ERYTHROCYTE [DISTWIDTH] IN BLOOD BY AUTOMATED COUNT: 12.7 % (ref 11–15)
GLUCOSE BLD-MCNC: 81 MG/DL (ref 70–99)
HAV IGM SER QL: 2.2 MG/DL (ref 1.6–2.6)
HCT VFR BLD AUTO: 35.4 %
HGB BLD-MCNC: 10.4 G/DL
IMM GRANULOCYTES # BLD AUTO: 0.02 X10(3) UL (ref 0–1)
IMM GRANULOCYTES NFR BLD: 0.3 %
LYMPHOCYTES # BLD AUTO: 2.36 X10(3) UL (ref 1–4)
LYMPHOCYTES NFR BLD AUTO: 36.4 %
MCH RBC QN AUTO: 31.3 PG (ref 26–34)
MCHC RBC AUTO-ENTMCNC: 29.4 G/DL (ref 31–37)
MCV RBC AUTO: 106.6 FL
MONOCYTES # BLD AUTO: 0.35 X10(3) UL (ref 0.1–1)
MONOCYTES NFR BLD AUTO: 5.4 %
NEUTROPHILS # BLD AUTO: 3.22 X10 (3) UL (ref 1.5–7.7)
NEUTROPHILS # BLD AUTO: 3.22 X10(3) UL (ref 1.5–7.7)
NEUTROPHILS NFR BLD AUTO: 49.5 %
OSMOLALITY SERPL CALC.SUM OF ELEC: 305 MOSM/KG (ref 275–295)
PLATELET # BLD AUTO: 179 10(3)UL (ref 150–450)
POTASSIUM SERPL-SCNC: 4.4 MMOL/L (ref 3.5–5.1)
RBC # BLD AUTO: 3.32 X10(6)UL
SODIUM SERPL-SCNC: 147 MMOL/L (ref 136–145)
WBC # BLD AUTO: 6.5 X10(3) UL (ref 4–11)

## 2021-02-12 PROCEDURE — 99233 SBSQ HOSP IP/OBS HIGH 50: CPT | Performed by: OTHER

## 2021-02-12 NOTE — PLAN OF CARE
Problem: Patient Centered Care  Goal: Patient preferences are identified and integrated in the patient's plan of care  Description: Interventions:  - What would you like us to know as we care for you?   - Provide timely, complete, and accurate informatio algorithm/standards of care as needed  Outcome: Progressing     Problem: MUSCULOSKELETAL - ADULT  Goal: Return mobility to safest level of function  Description: INTERVENTIONS:  - Assess patient stability and activity tolerance for standing, transferring a Progressing     Problem: SAFETY ADULT - FALL  Goal: Free from fall injury  Description: INTERVENTIONS:  - Assess pt frequently for physical needs  - Identify cognitive and physical deficits and behaviors that affect risk of falls.   - [place] fall precaut

## 2021-02-12 NOTE — CM/SW NOTE
BPCI Bundled Advanced Medicare Program    Plan of care reviewed for care coordination and discharge planning.  Noted pt falls under  BPCI/Medicare program, with DRG Renal Failure ([address], [address])    [facility]      / to

## 2021-02-12 NOTE — OCCUPATIONAL THERAPY NOTE
OCCUPATIONAL THERAPY EVALUATION - INPATIENT     Room Number: 561/561-A  Evaluation Date: 2/12/2021  Type of Evaluation: Initial  Presenting Problem: Admitted 2/10 with generalized weakness. Prior admit 12/2020 with similar concern.  PMH includes  CKD stage Degree of Impairment: 59.67% has been calculated based on documentation in the [facility] '6 clicks' Inpatient Daily Activity Short Form.   Research supports that patients with this level of impairment may benefit from KATY     PPE worn includes gloves, surgical 2006    repeat 2011   • COLONOSCOPY     • COLONOSCOPY N/A 4/1/2019    Performed by Caroline Schumacher MD at [facility] ENDOSCOPY   • ESOPHAGOGASTRODUODENOSCOPY, POSSIBLE BIOPSY, POSSIBLE POLYPECTOMY 36425 N/A 1/7/2020    Performed by Maddie Martinez MD at [facility] S Score (AM-PAC Scale): 33.39  CMS Modifier (G-Code): CK    FUNCTIONAL TRANSFER ASSESSMENT  Supine to Sit : Minimum assistance  Sit to Stand:  Moderate assistance(x2)  Chair Transfer: Not tested    Bedroom Mobility: Not appropriate this date    BALANCE ASSESS

## 2021-02-12 NOTE — CM/SW NOTE
MARBELLA was notified that the pt's brother would prefer [facility] for the pt's rehab, MARBELLA confirmed the plan with the pt. And she is agreeable. [facility] has been reserved in [place].     MARBELLA/SAUMYA will continue to follow and assist.      Arlen Soares

## 2021-02-12 NOTE — PROGRESS NOTES
Per lab unable to add lithium level today's lab as per from 's requestok to order for tomorrow morning per Tara Shah if unable to add today's lab.  Lithium level alreay ordered for tomorrow per Jose Alfredo Schofield

## 2021-02-12 NOTE — PROGRESS NOTES
[facility] Hospitalist Progress Note     CC: Hospital Follow up    PCP: Shayy Chong MD       Assessment/Plan:     Principal Problem:    Weakness generalized  Active Problems:    Bipolar disorder, unspecified ([facility] [address]    Renal insufficiency    Metabolic acidosis (36.3 °C)-97.8 °F (36.6 °C)] 97.3 °F (36.3 °C)  Pulse:  [62-68] 68  Resp:  [16-18] 18  BP: (138-145)/(63-78) 140/78      Intake/Output:    Intake/Output Summary (Last 24 hours) at 2/12/2021 1107  Last data filed at 2/12/2021 0900  Gross per 24 hour   Intak acetaminophen, PEG 3350, bisacodyl, ondansetron HCl, Metoclopramide HCl, ALPRAZolam, HYDROcodone-acetaminophen, docusate sodium

## 2021-02-12 NOTE — CONSULTS
[facility]    PATIENT'S NAME: Megan Quinonez   ATTENDING PHYSICIAN: Tad D. Monnie Goodell, MD   CONSULTING PHYSICIAN: Jamaica Amaya MD   PATIENT ACCOUNT#:   [id number]    [facility] [address] [place] [address] #:   [id number]       DATE OF BIRTH:  [birth year], hemoglobin 11.8, and lithium level of 1.4. The patient herself today reporting feeling tired but reporting that she does not want to change lithium just as a proactive measurement to prevent talking about lithium.   The patient, from previous history mg daily. SOCIAL HISTORY:  The patient is a single female, never been . No children. Lives with her brother. No history of alcohol, tobacco or substance abuse. Labs have been reviewed, as mentioned above. Vital signs have been stable. the dosage into 150 mg b.i.d.  3.   Change Lithobid to regular lithium carbonate 150 mg b.i.d.   4.   Continue amitriptyline 50 mg nightly. 5.   Suggest clonidine for excessive sialorrhea, but patient and brother refused.   6.   Suggest addressing the thy

## 2021-02-12 NOTE — PHYSICAL THERAPY NOTE
PHYSICAL THERAPY TREATMENT NOTE - INPATIENT     Room Number: 561/561-A       Presenting Problem: weakness, dehydration    Problem List  Principal Problem:    Weakness generalized  Active Problems:    Bipolar disorder, unspecified ([facility] [address]    Renal insufficien conservation;Patient education;Gait training;Neuromuscular re-educate;Strengthening;Transfer training;Balance training    SUBJECTIVE  \"I have my magic shoes on\"     OBJECTIVE  Precautions: Bed/chair alarm    WEIGHT BEARING RESTRICTION  Weight Bearing Res home    Goal #1 Patient is able to demonstrate supine - sit EOB @ level: modified independent      Goal #1   Current Status  Min A x 1 supine>sit.  Max A x 2 sit>supine   Goal #2 Patient is able to demonstrate transfers Sit to/from Stand at assistance level

## 2021-02-13 ENCOUNTER — APPOINTMENT (OUTPATIENT)
Dept: CT IMAGING | Facility: HOSPITAL | Age: 81
DRG: 683 | End: 2021-02-13
Attending: HOSPITALIST
Payer: MEDICARE

## 2021-02-13 LAB
ANION GAP SERPL CALC-SCNC: 3 MMOL/L (ref 0–18)
BASOPHILS # BLD AUTO: 0.02 X10(3) UL (ref 0–0.2)
BASOPHILS NFR BLD AUTO: 0.3 %
BUN BLD-MCNC: 18 MG/DL (ref 7–18)
BUN/CREAT SERPL: 8.3 (ref 10–20)
CALCIUM BLD-MCNC: 9.4 MG/DL (ref 8.5–10.1)
CHLORIDE SERPL-SCNC: 123 MMOL/L (ref 98–112)
CO2 SERPL-SCNC: 19 MMOL/L (ref 21–32)
CREAT BLD-MCNC: 2.18 MG/DL
DEPRECATED RDW RBC AUTO: 50.3 FL (ref 35.1–46.3)
EOSINOPHIL # BLD AUTO: 0.53 X10(3) UL (ref 0–0.7)
EOSINOPHIL NFR BLD AUTO: 6.7 %
ERYTHROCYTE [DISTWIDTH] IN BLOOD BY AUTOMATED COUNT: 12.7 % (ref 11–15)
GLUCOSE BLD-MCNC: 83 MG/DL (ref 70–99)
HAV IGM SER QL: 2.3 MG/DL (ref 1.6–2.6)
HCT VFR BLD AUTO: 40.1 %
HGB BLD-MCNC: 11.7 G/DL
IMM GRANULOCYTES # BLD AUTO: 0.02 X10(3) UL (ref 0–1)
IMM GRANULOCYTES NFR BLD: 0.3 %
LITHIUM SERPL-SCNC: 1.2 MMOL/L (ref 0.6–1.2)
LYMPHOCYTES # BLD AUTO: 2.12 X10(3) UL (ref 1–4)
LYMPHOCYTES NFR BLD AUTO: 26.7 %
MCH RBC QN AUTO: 31.4 PG (ref 26–34)
MCHC RBC AUTO-ENTMCNC: 29.2 G/DL (ref 31–37)
MCV RBC AUTO: 107.5 FL
MONOCYTES # BLD AUTO: 0.42 X10(3) UL (ref 0.1–1)
MONOCYTES NFR BLD AUTO: 5.3 %
NEUTROPHILS # BLD AUTO: 4.84 X10 (3) UL (ref 1.5–7.7)
NEUTROPHILS # BLD AUTO: 4.84 X10(3) UL (ref 1.5–7.7)
NEUTROPHILS NFR BLD AUTO: 60.7 %
OSMOLALITY SERPL CALC.SUM OF ELEC: 301 MOSM/KG (ref 275–295)
PLATELET # BLD AUTO: 212 10(3)UL (ref 150–450)
POTASSIUM SERPL-SCNC: 4.3 MMOL/L (ref 3.5–5.1)
RBC # BLD AUTO: 3.73 X10(6)UL
SODIUM SERPL-SCNC: 145 MMOL/L (ref 136–145)
WBC # BLD AUTO: 8 X10(3) UL (ref 4–11)

## 2021-02-13 PROCEDURE — 70450 CT HEAD/BRAIN W/O DYE: CPT | Performed by: HOSPITALIST

## 2021-02-13 RX ORDER — LITHIUM CARBONATE 150 MG/1
150 CAPSULE ORAL NIGHTLY
Status: DISCONTINUED | OUTPATIENT
Start: 2021-02-13 | End: 2021-02-14

## 2021-02-13 NOTE — CM/SW NOTE
Per Dr. Rashard Sauceda plan to discharge tomorrow to SNF. RN to inform facility of Akila Custard #2 dose to be vaccinated on Monday or Tuesday. CM spoke with Junior/admissions for [facility].     Per Maria Eugenia Burgos will let me know of bed availability toda

## 2021-02-13 NOTE — PLAN OF CARE
Patient alert and oriented. Complains of pain in Right lower leg, treated per MAR. Vital signs stable on room air. Call light in reach, bed alarm on.     Problem: Patient Centered Care  Goal: Patient preferences are identified and integrated in the patient' ulcer development  - Assess and document skin integrity  - Monitor for areas of redness and/or skin breakdown  - Initiate interventions, skin care algorithm/standards of care as needed  Outcome: Progressing     Problem: MUSCULOSKELETAL - ADULT  Goal: Retur distraction and/or relaxation techniques  - Monitor for opioid side effects  - Notify MD/LIP if interventions unsuccessful or patient reports new pain  - Anticipate increased pain with activity and pre-medicate as appropriate  Outcome: Progressing     Prob

## 2021-02-13 NOTE — PLAN OF CARE
Problem: Patient Centered Care  Goal: Patient preferences are identified and integrated in the patient's plan of care  Description: Interventions:  - What would you like us to know as we care for you?   - Provide timely, complete, and accurate informatio algorithm/standards of care as needed  Outcome: Progressing   Pt refused to get in chair with  lift,seen by PT,able to stand on the side of the bed couple x2 per PT,VSS,lithium level in am

## 2021-02-13 NOTE — PROGRESS NOTES
Chief Complaint   Patient presents with    Headache     x months     Neck Pain     knot that comes and goes for approx 2-3 weeks     1. Have you been to the ER, urgent care clinic since your last visit? Hospitalized since your last visit? No    2. Have you seen or consulted any other health care providers outside of the [address] [place] [address] since your last visit? Include any pap smears or colon screening. No     HPI  Sharri Ayers comes in for f/u care. Headache: Patient has a history of migrainous headache. Headache comes on and off. She is on Aimovig that has helped. She has been seen by the neurologist.  She will continue with this medication. Neck pain and swelling: Patient has neck pain and feels like the left aspect of her neck has a knot and swelling. Muscles are tight and she has discomfort with turning her head to the right and the left. No fever or chills. She has an occasional headache but denies changes in vision. I will order ultrasound of the neck to evaluate for any swelling. She has Aimovig that she takes for headaches. She can also take Tylenol as needed for pain. I will follow-up at next visit. DM2: Patient has type 2 diabetes mellitus. She is on Jardiance, glipizide and glargine. Blood glucose numbers have been fluctuating and these are usually low. She notices the low levels after she has taken glipizide. Numbers go down to 60s. She has had to cut back on the insulin and sometimes she does not take the glipizide. She is due to see the endocrinologist this week. We will discontinue glipizide for now. She will take the Jardiance and the insulin. HTN: Patient has hypertension. She is on Benicar and atenolol. Blood pressure is stable. We will continue with the current treatment plan. Mood disorder: Patient has a history of mood disorder. She is stressed lately given she has 2 healthy children with online schooling.   This is stressful since not on the [facility] Hospitalist Progress Note     CC: Hospital Follow up    PCP: Ragini Duncan MD       Assessment/Plan:     Principal Problem:    Weakness generalized  Active Problems:    Bipolar disorder, unspecified ([facility] [address]    Renal insufficiency    Metabolic acidosis height 5' 7\" (1.702 m), weight 245 lb 3.2 oz (111.2 kg), SpO2 100 %, not currently breastfeeding.     Temp:  [97.1 °F (36.2 °C)-98.4 °F (36.9 °C)] 97.5 °F (36.4 °C)  Pulse:  [58-71] 67  Resp:  [18-20] 20  BP: (123-143)/(57-96) 140/64      Intake/Output: help that her children need is available. We discussed supportive care measures and coping mechanisms. GERD: Patient has gastroesophageal reflux disease. She gets heartburn on and off. She is on Nexium. Denies dark stools or hematemesis. We will continue with the current treatment plan. Rash: Patient has dermatitis rash on her feet. I will give Lotrisone to apply. Past Medical History  Past Medical History:   Diagnosis Date    Asthma     Chronic constipation     Diabetes ([address]     Fibromyalgia     GERD (gastroesophageal reflux disease)     Hypertension     IBS (irritable bowel syndrome)     Migraines     unknown if aura    Psychiatric disorder     she denies any diagnosis despite being on antipsychotics, SSRIs, etc in the past    Scoliosis     Urinary incontinence     mixed       Surgical History  Past Surgical History:   Procedure Laterality Date    HX  SECTION  ,     HX COLONOSCOPY      HX DILATION AND CURETTAGE      HX ENDOSCOPY      HX PELVIC LAPAROSCOPY          Medications  Current Outpatient Medications   Medication Sig Dispense Refill    glucose blood VI test strips (ASCENSIA AUTODISC VI, ONE TOUCH ULTRA TEST VI) strip Check blood glucose 4 x day 400 Strip 3    lancets misc Check blood glucose 4 x day 400 Each 3    aspirin delayed-release 81 mg tablet Take 1 Tab by mouth daily. 90 Tab 1    atenoloL (TENORMIN) 25 mg tablet TAKE 1 TABLET BY MOUTH EVERY DAY 90 Tab 1    empagliflozin (JARDIANCE) 10 mg tablet Take 1 Tab by mouth daily. 30 Tab 2    insulin glargine (LANTUS,BASAGLAR) 100 unit/mL (3 mL) inpn 22 Units by SubCUTAneous route nightly. 30 mL 1    Insulin Needles, Disposable, 31 gauge x 5/16\" ndle To use daily with insulin injection subcutaneously 1 Package 11    glipiZIDE (GLUCOTROL) 10 mg tablet Take 1 Tab by mouth two (2) times daily (with meals). 60 Tab 2    gabapentin (NEURONTIN) 100 mg capsule Take 1 Cap by mouth three (3) times daily.  Max Daily Amount: 300 mg. 90 Cap 0    montelukast (SINGULAIR) 10 mg tablet Take 1 Tab by mouth nightly.  olmesartan (BENICAR) 40 mg tablet Take 1 Tab by mouth daily.  valACYclovir (VALTREX) 500 mg tablet TAKE 1 TABLET BY MOUTH TWICE A DAY FOR 5 DAYS      indomethacin (INDOCIN) 50 mg capsule Take 1 Cap by mouth two (2) times daily as needed for Pain. 60 Cap 2    ANORO ELLIPTA 62.5-25 mcg/actuation inhaler 1 INH DAILY - USE EVERY DAY  6    AIMOVIG AUTOINJECTOR 70 mg/mL injection AS DIRECTED - 1 INJECTION SUBQ ONCE MONTHLY  3    cetirizine (ZYRTEC) 10 mg tablet TAKE 1 TABLET EVERY DAY  0    rizatriptan (MAXALT-MLT) 10 mg disintegrating tablet PLEASE SEE ATTACHED FOR DETAILED DIRECTIONS  0    mometasone (NASONEX) 50 mcg/actuation nasal spray USE 1-2 SPRAYS INTO EACH NOSTRIL EVERY DAY AS NEEDED  0    FERRETTS IPS 40 mg/15 mL liqd 15 ML ORALLY 2 TIMES PER DAY. 2    EPINEPHrine (EPIPEN) 0.3 mg/0.3 mL injection AS DIRECTED AS NEEDED INTRAMUSCULAR 1 DAYS  5    albuterol (PROVENTIL HFA, VENTOLIN HFA, PROAIR HFA) 90 mcg/actuation inhaler Take 2 Puffs by inhalation every four (4) hours as needed for Wheezing or Shortness of Breath. 1 Inhaler 0    esomeprazole (NEXIUM) 40 mg capsule Take  by mouth daily.  POLYETHYLENE GLYCOL 3350 (MIRALAX PO) Take  by mouth.  CALCIUM CARBONATE/MAG HYDROX (MYLANTA PO) Take  by mouth. Allergies  Allergies   Allergen Reactions    Iodine Hives and Nausea and Vomiting    Iodinated Contrast Media Hives    Oxycodone-Acetaminophen Other (comments)     Hallucinations     Prochlorperazine Other (comments)    Propoxyphene-Acetaminophen Unknown (comments)     Allergy to propoxyphene only.   Has previously tolerated acetaminophen    Reglan [Metoclopramide] Not Reported This Time    Sulfa (Sulfonamide Antibiotics) Not Reported This Time    Wygesic Not Reported This Time       Family History  Family History   Problem Relation Age of Onset    Kidney Disease Father    24 [facility] hemorrhage or acute intracranial CT abnormalities. 3. Mild right sphenoid sinusitis redemonstrated.     Dictated by (CST): Chaparro Brothers MD on 2/13/2021 at 1:19 PM     Finalized by (CST): Chaparro Brothers MD on 2/13/2021 at 1:23 PM              Me Other Father         Pancreatic Disease    Cancer Father     Heart Disease Father     Heart Attack Father     Thyroid Disease Other         Grandparent       Social History  Social History     Socioeconomic History    Marital status: SINGLE     Spouse name: Not on file    Number of children: Not on file    Years of education: Not on file    Highest education level: Not on file   Occupational History    Not on file   Social Needs    Financial resource strain: Not on file    Food insecurity     Worry: Not on file     Inability: Not on file    Transportation needs     Medical: Not on file     Non-medical: Not on file   Tobacco Use    Smoking status: Never Smoker    Smokeless tobacco: Never Used   Substance and Sexual Activity    Alcohol use: No    Drug use: No    Sexual activity: Not on file   Lifestyle    Physical activity     Days per week: Not on file     Minutes per session: Not on file    Stress: Not on file   Relationships    Social connections     Talks on phone: Not on file     Gets together: Not on file     Attends Religion service: Not on file     Active member of club or organization: Not on file     Attends meetings of clubs or organizations: Not on file     Relationship status: Not on file    Intimate partner violence     Fear of current or ex partner: Not on file     Emotionally abused: Not on file     Physically abused: Not on file     Forced sexual activity: Not on file   Other Topics Concern    Not on file   Social History Narrative    Not on file       Review of Systems  Review of Systems - Review of all systems is negative except as noted above in the HPI.     Vital Signs  Visit Vitals  /74   Pulse 76   Temp 98.9 °F (37.2 °C) (Oral)   Resp 18   Ht 4' 11\" (1.499 m)   Wt 145 lb (65.8 kg)   SpO2 98%   BMI 29.29 kg/m²         Physical Exam  Physical Examination: General appearance - alert, well appearing, and in no distress, oriented to person, place, and time and acyanotic, in no respiratory distress  Mental status - alert, oriented to person, place, and time, affect appropriate to mood  Chest - clear to auscultation, no wheezes, rales or rhonchi, symmetric air entry  Heart - normal rate and regular rhythm, S1 and S2 normal  Abdomen - soft, nontender, nondistended, no masses or organomegaly  Neurological - abnormal neurological exam unchanged from prior examinations  Musculoskeletal -paracervical muscle tightness  Extremities - no pedal edema noted  Skin - DERMATITIS NOTED: erythematous maculopapular dermatitis on both feet  Neck -mild discomfort to palpation left aspect of the middle neck with muscle tightness noted. No obvious swelling palpated. Results  Results for orders placed or performed in visit on 09/28/20   AMB POC HEMOGLOBIN A1C   Result Value Ref Range    Hemoglobin A1c (POC) 9.9 %   AMB POC GLUCOSE BLOOD, BY GLUCOSE MONITORING DEVICE   Result Value Ref Range    Glucose POC [facility] mg/dL       ASSESSMENT and PLAN    ICD-10-CM ICD-9-CM    1. Neck swelling  R22.1 784.2 US THYROID/PARATHYROID/SOFT TISS   2. Rash and nonspecific skin eruption  R21 782.1 clotrimazole-betamethasone (LOTRISONE) topical cream   3. Type 2 diabetes mellitus with hyperglycemia, without long-term current use of insulin (HCC)  E11.65 250.00      790.29    4. Essential (primary) hypertension  I10 401.9    5. Migraine without status migrainosus, not intractable, unspecified migraine type  G43.909 346.90    6. Gastroesophageal reflux disease, unspecified whether esophagitis present  K21.9 530.81    7.  M[facility] ([facility]  F39 296.90      lab results and schedule of future lab studies reviewed with patient  reviewed diet, exercise and weight control  cardiovascular risk and specific lipid/LDL goals reviewed  reviewed medications and side effects in detail  specific diabetic recommendations: low cholesterol diet, weight control and daily exercise discussed, home glucose monitoring emphasized, all medications, side effects and compliance discussed carefully, glycohemoglobin and other lab monitoring discussed and long term diabetic complications discussed      I have discussed the diagnosis with the patient and the intended plan of care as seen in the above orders. The patient has received an after-visit summary and questions were answered concerning future plans. I have discussed medication, side effects, and warnings with the patient in detail. The patient verbalized understanding and is in agreement with the plan of care. The patient will contact the office with any additional concerns. Micheal Flores MD    PLEASE NOTE:   This document has been produced using voice recognition software.  Unrecognized errors in transcription may be present

## 2021-02-13 NOTE — PLAN OF CARE
Problem: Patient Centered Care  Goal: Patient preferences are identified and integrated in the patient's plan of care  Description: Interventions:  - What would you like us to know as we care for you?   - Provide timely, complete, and accurate informatio algorithm/standards of care as needed  Outcome: Progressing     Problem: MUSCULOSKELETAL - ADULT  Goal: Return mobility to safest level of function  Description: INTERVENTIONS:  - Assess patient stability and activity tolerance for standing, transferring a Anticipate increased pain with activity and pre-medicate as appropriate  Outcome: Progressing     Problem: RISK FOR INFECTION - ADULT  Goal: Absence of fever/infection during anticipated neutropenic period  Description: INTERVENTIONS  - Monitor WBC  - Admi

## 2021-02-13 NOTE — PROGRESS NOTES
Uri Loco is a [de-identified]year old female with history of bipolar disorder on lithium for over 42 years who presented to the hospital with fatigue, confusion and tremors and found with lithium level of 1.4.     I spent a total of over 35 minutes with the pat and refused to take them. Today the patient reporting tiredness, fatigue, noticeable tremors and difficulty walking. Patient also demonstrating alternation in her mood with some cognitive impairment. Patient denied any homicidal or suicidal ideation. Cancer Maternal Aunt       Social History: Social History    Tobacco Use      Smoking status: Never Smoker      Smokeless tobacco: Never Used    Alcohol use:  Yes      Alcohol/week: 1.0 standard drinks      Types: 1 Glasses of wine per week      Frequency: 02/12/2021    K 4.4 02/12/2021     (H) 02/12/2021    CO2 18.0 (L) 02/12/2021    GLU 81 02/12/2021    CA 8.6 02/12/2021    ALB 3.0 (L) 02/11/2021    ALKPHO 115 02/11/2021    TP 6.2 (L) 02/11/2021    AST 6 (L) 02/11/2021    ALT 9 (L) 02/11/2021    PTT Continue amitriptyline 50 mg nightly. 6.  Coordination then with the team.  7.  Follow up during this admission.     Orders This Visit:  Orders Placed This Encounter      Basic Metabolic Panel (8)      Hepatic Function Panel (7)      CBC With Differential

## 2021-02-14 VITALS
OXYGEN SATURATION: 96 % | DIASTOLIC BLOOD PRESSURE: 56 MMHG | BODY MASS INDEX: 38.48 KG/M2 | SYSTOLIC BLOOD PRESSURE: 112 MMHG | HEIGHT: 67 IN | WEIGHT: 245.19 LBS | TEMPERATURE: 98 F | HEART RATE: 62 BPM | RESPIRATION RATE: 18 BRPM

## 2021-02-14 LAB
ANION GAP SERPL CALC-SCNC: 4 MMOL/L (ref 0–18)
BUN BLD-MCNC: 19 MG/DL (ref 7–18)
BUN/CREAT SERPL: 8.5 (ref 10–20)
CALCIUM BLD-MCNC: 9.1 MG/DL (ref 8.5–10.1)
CHLORIDE SERPL-SCNC: 123 MMOL/L (ref 98–112)
CO2 SERPL-SCNC: 20 MMOL/L (ref 21–32)
CREAT BLD-MCNC: 2.23 MG/DL
GLUCOSE BLD-MCNC: 87 MG/DL (ref 70–99)
HAV IGM SER QL: 2.2 MG/DL (ref 1.6–2.6)
OSMOLALITY SERPL CALC.SUM OF ELEC: 306 MOSM/KG (ref 275–295)
POTASSIUM SERPL-SCNC: 4.3 MMOL/L (ref 3.5–5.1)
SODIUM SERPL-SCNC: 147 MMOL/L (ref 136–145)

## 2021-02-14 RX ORDER — LEVOTHYROXINE SODIUM 0.1 MG/1
100 TABLET ORAL
Qty: 30 TABLET | Refills: 0 | Status: SHIPPED | COMMUNITY
Start: 2021-02-15 | End: 2021-03-08

## 2021-02-14 RX ORDER — ALPRAZOLAM 0.5 MG/1
0.5 TABLET ORAL 2 TIMES DAILY PRN
Qty: 2 TABLET | Refills: 0 | Status: ON HOLD | OUTPATIENT
Start: 2021-02-14 | End: 2021-03-31

## 2021-02-14 RX ORDER — HYDROCODONE BITARTRATE AND ACETAMINOPHEN 10; 325 MG/1; MG/1
2 TABLET ORAL EVERY 8 HOURS PRN
Qty: 8 TABLET | Refills: 0 | Status: SHIPPED | OUTPATIENT
Start: 2021-02-14 | End: 2021-03-08

## 2021-02-14 RX ORDER — LITHIUM CARBONATE 150 MG/1
150 CAPSULE ORAL NIGHTLY
Qty: 3 CAPSULE | Refills: 0 | Status: SHIPPED | OUTPATIENT
Start: 2021-02-14 | End: 2021-03-08

## 2021-02-14 NOTE — PLAN OF CARE
Problem: Patient Centered Care  Goal: Patient preferences are identified and integrated in the patient's plan of care  Description: Interventions:  - What would you like us to know as we care for you?   - Provide timely, complete, and accurate informatio medication profile  2/14/2021 1015 by Chad Dove RN  Outcome: Completed  2/14/2021 1012 by Chad Dove, VIOLETA  Outcome: Progressing     Problem: SKIN/TISSUE INTEGRITY - ADULT  Goal: Skin integrity remains intact  Description: INT increasing activity/tolerance for mobility and gait  - Educate and engage patient/family in tolerated activity level and precautions    2/14/2021 1015 by Katrin Mohan RN  Outcome: Completed  2/14/2021 1012 by VIOLETA Link environment to reduce risk of injury  - Provide assistive devices as appropriate  - Consider OT/PT consult to assist with strengthening/mobility  - Encourage toileting schedule  2/14/2021 1015 by Noel Cavanaugh RN  Outcome: Completed  2/14/2021

## 2021-02-14 NOTE — PLAN OF CARE
Problem: Patient Centered Care  Goal: Patient preferences are identified and integrated in the patient's plan of care  Description: Interventions:  - What would you like us to know as we care for you?  From home  - Provide timely, complete, and accurate i algorithm/standards of care as needed  Outcome: Progressing     Problem: MUSCULOSKELETAL - ADULT  Goal: Return mobility to safest level of function  Description: INTERVENTIONS:  - Assess patient stability and activity tolerance for standing, transferring a Interventions:  - Assess patient's functional ability and stability  - Promote increasing activity/tolerance for mobility and gait  - Educate and engage patient/family in tolerated activity level and precautions    Outcome: Progressing     Problem: PAIN - reach. Bed alarm on and bed in lowest position.

## 2021-02-14 NOTE — DISCHARGE SUMMARY
General Medicine Discharge Summary     Patient ID:  Dawood Price  [de-identified]year old  2/16/1940    Admit date: 2/10/2021    Discharge date and time: 2/14/2021    Attending Physician: Deborah Dickerson MD     Consults: IP CONSULT TO HOSPITALIST  CONSULT [address] of breath. She denies any fevers or chills. She did note a week ago she was noted to have diarrhea for a couple days which is now resolved but her brother notes that she has been drinking less fluid last week.     Hospital Course:   Patient is a 80 year o than right. 2. No acute intracranial hemorrhage or acute intracranial CT abnormalities. 3. Mild right sphenoid sinusitis redemonstrated.     Dictated by (CST): Pinky Serna MD on 2/13/2021 at 1:19 PM     Finalized by (CST): Pinky Serna MD o (two) times daily as needed for constipation.      Iron 325 (65 Fe) MG Tabs     MIRALAX OR     Vitamin B-12 1000 MCG Tabs  Commonly known as: VITAMIN B12        STOP taking these medications    Lithium Carbonate  MG Tbcr  Commonly known as: LITHOBID

## 2021-02-14 NOTE — PROGRESS NOTES
Floor rn called [facility] for report. Per [employer], [facility] rn Radha Allen  Given frport. Including pt need for 2nd Moderna vaccine covid shot& pt  coivd negative. [facility] ambulance here, transported to [facility] via [employer].   No

## 2021-02-14 NOTE — CM/SW NOTE
CM received call from Junior/tabitha @ [facility] to confirm pt will be discharge today at 11am.    In addition, CM confirmed pt needs 2nd dose of Moderna by Monday or Tuesday. Albert Alonzo confirmed this order for 2nd Moderna dose.     CM notified RN discharge

## 2021-03-28 ENCOUNTER — HOSPITAL ENCOUNTER (OUTPATIENT)
Facility: HOSPITAL | Age: 81
Setting detail: OBSERVATION
Discharge: NURSING, REHAB OR LONG-TERM CARE | End: 2021-03-31
Attending: EMERGENCY MEDICINE | Admitting: HOSPITALIST
Payer: MEDICARE

## 2021-03-28 DIAGNOSIS — R53.1 WEAKNESS GENERALIZED: Primary | ICD-10-CM

## 2021-03-28 DIAGNOSIS — N39.0 URINARY TRACT INFECTION WITHOUT HEMATURIA, SITE UNSPECIFIED: ICD-10-CM

## 2021-03-28 RX ORDER — LITHIUM CARBONATE 300 MG/1
300 TABLET, FILM COATED, EXTENDED RELEASE ORAL
Status: DISCONTINUED | OUTPATIENT
Start: 2021-03-28 | End: 2021-03-31

## 2021-03-28 RX ORDER — ONDANSETRON 2 MG/ML
4 INJECTION INTRAMUSCULAR; INTRAVENOUS EVERY 6 HOURS PRN
Status: DISCONTINUED | OUTPATIENT
Start: 2021-03-28 | End: 2021-03-31

## 2021-03-28 RX ORDER — LEVOTHYROXINE SODIUM 0.1 MG/1
100 TABLET ORAL
Status: DISCONTINUED | OUTPATIENT
Start: 2021-03-28 | End: 2021-03-28

## 2021-03-28 RX ORDER — LEVOFLOXACIN 5 MG/ML
500 INJECTION, SOLUTION INTRAVENOUS ONCE
Status: COMPLETED | OUTPATIENT
Start: 2021-03-28 | End: 2021-03-28

## 2021-03-28 RX ORDER — ATORVASTATIN CALCIUM 10 MG/1
10 TABLET, FILM COATED ORAL NIGHTLY
Status: DISCONTINUED | OUTPATIENT
Start: 2021-03-28 | End: 2021-03-31

## 2021-03-28 RX ORDER — ASPIRIN 81 MG/1
81 TABLET ORAL DAILY
Status: DISCONTINUED | OUTPATIENT
Start: 2021-03-28 | End: 2021-03-31

## 2021-03-28 RX ORDER — AMITRIPTYLINE HYDROCHLORIDE 50 MG/1
50 TABLET, FILM COATED ORAL NIGHTLY
Status: DISCONTINUED | OUTPATIENT
Start: 2021-03-28 | End: 2021-03-31

## 2021-03-28 RX ORDER — SODIUM CHLORIDE 9 MG/ML
INJECTION, SOLUTION INTRAVENOUS CONTINUOUS
Status: DISCONTINUED | OUTPATIENT
Start: 2021-03-28 | End: 2021-03-30

## 2021-03-28 RX ORDER — ACETAMINOPHEN 325 MG/1
650 TABLET ORAL EVERY 6 HOURS PRN
Status: DISCONTINUED | OUTPATIENT
Start: 2021-03-28 | End: 2021-03-31

## 2021-03-28 RX ORDER — POLYETHYLENE GLYCOL 3350 17 G/17G
17 POWDER, FOR SOLUTION ORAL DAILY PRN
Status: DISCONTINUED | OUTPATIENT
Start: 2021-03-28 | End: 2021-03-31

## 2021-03-28 RX ORDER — ALPRAZOLAM 0.5 MG/1
0.5 TABLET ORAL 2 TIMES DAILY PRN
Status: DISCONTINUED | OUTPATIENT
Start: 2021-03-28 | End: 2021-03-31

## 2021-03-28 RX ORDER — MELATONIN
325 DAILY
Status: DISCONTINUED | OUTPATIENT
Start: 2021-03-28 | End: 2021-03-31

## 2021-03-28 RX ORDER — HEPARIN SODIUM 5000 [USP'U]/ML
5000 INJECTION, SOLUTION INTRAVENOUS; SUBCUTANEOUS EVERY 12 HOURS SCHEDULED
Status: DISCONTINUED | OUTPATIENT
Start: 2021-03-28 | End: 2021-03-31

## 2021-03-28 RX ORDER — HYDROCODONE BITARTRATE AND ACETAMINOPHEN 10; 325 MG/1; MG/1
2 TABLET ORAL EVERY 6 HOURS PRN
Status: ON HOLD | COMMUNITY
End: 2021-03-31

## 2021-03-28 RX ORDER — CHOLECALCIFEROL (VITAMIN D3) 125 MCG
1000 CAPSULE ORAL DAILY
Status: DISCONTINUED | OUTPATIENT
Start: 2021-03-28 | End: 2021-03-31

## 2021-03-28 RX ORDER — HYDROCODONE BITARTRATE AND ACETAMINOPHEN 10; 325 MG/1; MG/1
2 TABLET ORAL EVERY 6 HOURS PRN
Status: DISCONTINUED | OUTPATIENT
Start: 2021-03-28 | End: 2021-03-31

## 2021-03-28 RX ORDER — BISACODYL 10 MG
10 SUPPOSITORY, RECTAL RECTAL
Status: DISCONTINUED | OUTPATIENT
Start: 2021-03-28 | End: 2021-03-31

## 2021-03-28 RX ORDER — LEVOTHYROXINE SODIUM 0.12 MG/1
125 TABLET ORAL
Status: DISCONTINUED | OUTPATIENT
Start: 2021-03-29 | End: 2021-03-31

## 2021-03-28 NOTE — ED INITIAL ASSESSMENT (HPI)
Pt sent here for weakness  Pt has not been out of bed in the past 36 hours. Pt reports dizziness and vomiting x 2. Pt states that she felt dizzy when sitting. Normal vision: sees adequately in most situations; can see medication labels, newsprint

## 2021-03-28 NOTE — ED NOTES
Orders for admission, patient is aware of plan and ready to go upstairs. Any questions, please call ED VIOLETA [place] extension [phone].       Type of COVID test sent: Rapid  COVID Suspicion level: low    Titratable drug(s) infusing: NA  Rate: NA    LOC at time o

## 2021-03-28 NOTE — ED PROVIDER NOTES
Patient Seen in: [facility] AND [facility] Emergency Department      History   Patient presents with:  Fatigue    Stated Complaint:     HPI/Subjective:   HPI    Patient is an 80-year-old female with history of osteoarthritis, bipolar disorder and hypothyroidism • ESOPHAGOGASTRODUODENOSCOPY, POSSIBLE BIOPSY, POSSIBLE POLYPECTOMY 11257 N/A 1/7/2020    Performed by Javed Molina MD at [facility] [place]   • FOOT SURGERY Right    • GENICULAR NERVE BLOCK Right 1/8/2018    Performed by Makayla Phelps MD at  Ratio 7.8 (*)     Calculated Osmolality 306 (*)     GFR, Non- 18 (*)     GFR, -American 21 (*)     All other components within normal limits   TSH W REFLEX TO FREE T4 - Abnormal; Notable for the following components:    TSH 5.750 (*) recent pertinent discharge summaries, testing, and procedures and reviewed those reports. Critical Care:   I spent a total of 60 minutes of critical care time in obtaining history, performing a physical exam, bedside monitoring of interventions, collecti

## 2021-03-28 NOTE — H&P
[facility] Hospitalist H&P       CC: Patient presents with:  Fatigue       PCP: Timur Deleon MD    History of Present Illness:   Patient is a 80 year old female with history of CKD stage 4, CAD, HTN, hypothyroidism, depression, anxiety, bipolar DO on lithium, N/A 1/7/2020    Performed by Racquel Marcus MD at [facility] Right    • GENICULAR NERVE BLOCK Right 1/8/2018    Performed by Jen Humphreys MD at [facility]   • LUCIA LOCALIZATION WIRE 1 SITE LEFT (CPT=1928 tobacco: Never Used    Alcohol use:  Yes      Alcohol/week: 1.0 standard drinks      Types: 1 Glasses of wine per week      Comment: occ         Fam Hx  Family History   Problem Relation Age of Onset   • Breast Cancer Mother 61   • Breast Cancer Maternal Au anxiety, bipolar DO on lithium, who presents to the hospital for generalized weakness, difficulty walking, patient noted to have UTI.      Generalized weakness  UTI  Mild HALLIE  - UA ++  - IV ceftriaxone  - urine culture pending  - no focal weakness on exam

## 2021-03-29 PROCEDURE — 90792 PSYCH DIAG EVAL W/MED SRVCS: CPT | Performed by: OTHER

## 2021-03-29 RX ORDER — LOPERAMIDE HYDROCHLORIDE 2 MG/1
2 CAPSULE ORAL 3 TIMES DAILY PRN
Status: DISCONTINUED | OUTPATIENT
Start: 2021-03-29 | End: 2021-03-31

## 2021-03-29 NOTE — CM/SW NOTE
MDO:  DC PLANNING    CM met with patient for dc planning. Per pt she lives alone at [address]. Pt states received therapy there however, does not know name of agency. Pt stated has history w/[facility].     Pt has her broth

## 2021-03-29 NOTE — PLAN OF CARE
Patient alert. Pain controlled with prn Norco.  No nausea, vomiting or dizziness at this time. IVF infusing as ordered. Pt does not attempt to get out of bed. Call light within easy reach. VSS. No acute distress noted.     Problem: PAIN - ADULT  Goal:

## 2021-03-29 NOTE — PHYSICAL THERAPY NOTE
PHYSICAL THERAPY EVALUATION - INPATIENT     Room Number: 573/573-A  Evaluation Date: 3/29/2021  Type of Evaluation: Initial   Physician Order: PT Eval and Treat    Presenting Problem: weakness, dizziness, vomiting, UTI; had not been OOB in 36 hours prior discharge. DISCHARGE RECOMMENDATIONS  PT Discharge Recommendations: Sub-acute rehabilitation    PLAN  PT Treatment Plan: Bed mobility;Transfer training;Gait training;Family education;Patient education; Energy conservation; Endurance; Coordination; Body mech • TIA (transient ischemic attack) 1/2013   • UTI (lower urinary tract infection)    • Visual impairment     reading glasses       Past Surgical History  Past Surgical History:   Procedure Laterality Date   • BENIGN BIOPSY LEFT      over 10 years ago   • and decreased awareness of need for safety        BALANCE  Static Sitting: Fair +  Dynamic Sitting: Fair +  Static Standing: Poor +  Dynamic Standing: Not tested    ADDITIONAL TESTS                                    NEUROLOGICAL FINDINGS ambulate 100 feet with assist device: walker - rolling at assistance level: modified independent   Goal #3   Current Status    Goal #4 Patient to demonstrate independence with home activity/exercise instructions provided to patient in preparation for disch

## 2021-03-29 NOTE — CONSULTS
[facility]    Report of Consultation    Nate Gaona Patient Status:  Observation    1940 MRN [id number]   Location [facility] 5SW/SE Attending Nereyda Santos MD   Hosp Day # 0 PCP Royal Angeli MD     Date of Admissi daily and he believes I did to change the dosage? Nevertheless the patient is currently back on lithium 300 mg nightly and patient reporting that she has been feeling mild depression because of her physical condition.   Otherwise patient denied any manic • ESOPHAGOGASTRODUODENOSCOPY, POSSIBLE BIOPSY, POSSIBLE POLYPECTOMY 93346 N/A 1/7/2020    Performed by Herve Frye MD at [facility]   • FOOT SURGERY Right    • GENICULAR NERVE BLOCK Right 1/8/2018    Performed by Rohan Hernandez MD at  [facility]  Levothyroxine Sodium tab 125 mcg, 125 mcg, Oral, Before breakfast      HYDROcodone-acetaminophen  MG Oral Tab, Take 2 tablets by mouth every 6 (six) hours as needed for Pain.   Lithium Carbonate  MG Oral Tab CR, Take 300 mg by mouth once d 03/28/2021    DDIMER 0.67 (H) 07/25/2017    ESRML 19 04/10/2018    CRP 0.15 04/10/2018    MG 2.1 03/29/2021    PHOS 3.80 12/01/2020    TROP <0.045 12/27/2019    CK 29 03/28/2021    B12 1,847 (H) 03/26/2019         Imaging:  No results found.     Vital Signs faced due to their rigidity in the treatment. 3.  Continue lithium 300 mg nightly per  4. Patient is appropriate to transfer to skilled nursing facility for rehab.     Orders This Visit:  Orders Placed This Encounter      CBC With Differential With Terra

## 2021-03-29 NOTE — OCCUPATIONAL THERAPY NOTE
OCCUPATIONAL THERAPY EVALUATION - INPATIENT     Room Number: 573/573-A  Evaluation Date: 3/29/2021  Type of Evaluation: Initial  Presenting Problem: dizziness and vomiting     Physician Order: IP Consult to Occupational Therapy  Reason for Therapy: ADL/IAD supine in bed since Saturday (she did walk to the bathroom once with RW on Saturday). Pt completed ADLs and functional mobility with up to total A. Pt was motivated to get out of bed to pull up depends. Pt incontinent of urine.  Pt was assisted to EOB w nephrology   • Coronary atherosclerosis    • Depression    • Disorder of thyroid    • High cholesterol    • HOSPITALIZATIONS 2006    lithium toxicity   • HOSPITALIZATIONS 2008    cardiac cath, stenting of OM   • HYPERLIPIDEMIA    • HYPERTENSION    • HYPOTH for assist , safety     RANGE OF MOTION   Upper extremity ROM is within functional limits     STRENGTH ASSESSMENT  Upper extremity strength is within functional limits     ACTIVITIES OF DAILY LIVING ASSESSMENT  AM-PAC ‘6-Clicks’ Inpatient Daily Activity Sh

## 2021-03-29 NOTE — CONSULTS
[facility] CARDIOLOGY CONSULT      Alison Santillan is a 80year old female who I assessed as follows:  Patient presents with:  Fatigue         REASON FOR CONSULTATION:    dyspnea            ASSESSMENT/PLAN:     IMPRESSIONS:  1. Fatigue  2. Dyspnea  3.  Bipo • COLONOSCOPY N/A 4/1/2019    Performed by Jose Luis Hoffmann MD at [address] [facility] ENDOSCOPY   • ESOPHAGOGASTRODUODENOSCOPY, POSSIBLE BIOPSY, POSSIBLE POLYPECTOMY [phone] N/A 1/7/2020    Performed by Uri Redman MD at [address] [place] [address] Intravenous, Q24H  Levothyroxine Sodium tab 125 mcg, 125 mcg, Oral, Before breakfast            Allergies    Allegra-D               ANXIETY  Benadryl [Banophen]     ANXIETY  Thorazine [Chlorpro*    HIVES            REVIEW OF SYSTEMS:   GENERAL HEALTH: no last 168 hours. Recent Labs   Lab 03/29/21  0609   RBC 3.60*   HGB 11.1*   HCT 39.1   .6*   MCH 30.8   MCHC 28.4*   RDW 12.8   NEPRELIM 7.81*   WBC 11.1*   .0         Imaging:  No results found. CT brain Feb 2021:  CONCLUSION:   1.  Bilat

## 2021-03-29 NOTE — PROGRESS NOTES
[facility] Hospitalist Progress Note     CC: Hospital Follow up    PCP: Dipak Terrazas MD       Assessment/Plan:     Principal Problem:    Weakness generalized  Active Problems:    Urinary tract infection without hematuria, site unspecified  Patient is a 81 year (112 kg), SpO2 96 %, not currently breastfeeding.     Temp:  [97.6 °F (36.4 °C)-98.1 °F (36.7 °C)] 97.6 °F (36.4 °C)  Pulse:  [64-66] 64  Resp:  [18] 18  BP: (131-147)/(61-68) 131/61      Intake/Output:    Intake/Output Summary (Last 24 hours) at 3/29/2021 input(s): ALT, AST, ALB, AMYLASE, LIPASE, LDH in the last 168 hours. Invalid input(s): ALPHOS, TBIL, DBIL, TPROT      Imaging:  No results found.       Meds:     • Amitriptyline HCl  50 mg Oral Nightly   • aspirin  81 mg Oral Daily   • atorvastatin  10 m

## 2021-03-29 NOTE — PLAN OF CARE
Had therapy eval.  KATY recommended. Was only able to stand x 2 today. Normally in independent living.     Problem: Patient Centered Care  Goal: Patient preferences are identified and integrated in the patient's plan of care  Description: Interventions:  - Problem: RISK FOR INFECTION - ADULT  Goal: Absence of fever/infection during anticipated neutropenic period  Description: INTERVENTIONS  - Monitor WBC  - Administer growth factors as ordered  - Implement neutropenic guidelines  Outcome: Not Progressing

## 2021-03-30 ENCOUNTER — APPOINTMENT (OUTPATIENT)
Dept: CV DIAGNOSTICS | Facility: HOSPITAL | Age: 81
End: 2021-03-30
Attending: INTERNAL MEDICINE
Payer: MEDICARE

## 2021-03-30 PROCEDURE — 93306 TTE W/DOPPLER COMPLETE: CPT | Performed by: INTERNAL MEDICINE

## 2021-03-30 RX ORDER — CIPROFLOXACIN 500 MG/1
500 TABLET, FILM COATED ORAL EVERY 24 HOURS
Status: DISCONTINUED | OUTPATIENT
Start: 2021-03-30 | End: 2021-03-31

## 2021-03-30 NOTE — PLAN OF CARE
Pt. Is alert and orientated. Complained of pain. PRN norco given that resolved it. Refused SVDs and purewick. Incontinent of urine and BM. Bed pan used. Ambulates with walker. General diet maintained. Safety precautions maintained.    Problem: Patient Tai reports new pain  - Anticipate increased pain with activity and pre-medicate as appropriate  Outcome: Progressing     Problem: RISK FOR INFECTION - ADULT  Goal: Absence of fever/infection during anticipated neutropenic period  Description: INTERVENTIONS  -

## 2021-03-30 NOTE — PROGRESS NOTES
[facility] Hospitalist Progress Note     CC: Hospital Follow up    PCP: Didi Rain MD       Assessment/Plan:     Principal Problem:    Weakness generalized  Active Problems:    Bipolar disorder, unspecified ([facility] [address]    Urinary tract infection without hematuria, [phone]        Subjective:     No CP, SOB, or N/V. Still feels weak with dysuria. OBJECTIVE:    Blood pressure 148/75, pulse 65, temperature 97.9 °F (36.6 °C), temperature source Oral, resp.  rate 18, height 5' 7\" (1.702 m), weight 246 lb 14.6 oz 12.7   NEPRELIM 11.18* 7.81* 5.36   WBC 13.8* 11.1* 8.6   .0 192.0 199.0         Recent Labs   Lab 03/28/21  1042 03/29/21  0609 03/30/21  0552   * 84 78   BUN 19* 18 14   CREATSERUM 2.43* 2.14* 2.04*   GFRAA 21* 24* 26*   GFRNAA 18* 21* 22*

## 2021-03-30 NOTE — PROGRESS NOTES
[facility] Pharmacy Note:  Renal Adjustment for ciprofloxacin (CIPRO)    Jay Ordonez is a 80year old patient who has been prescribed ciprofloxacin (CIPRO) 500 mg every 12 hrs.   The estimated creatinine clearance is 21 mL/min (A) (based on SCr of 2.04 mg/dL

## 2021-03-30 NOTE — CM/SW NOTE
PT rec is KATY, CM called brother Audrey Fairchild to discuss. Per Audrey Gurinder he is agreeable with plan and his choice is [facility] due to recent hx there. Audrey Fairchild asked CM not to discuss with pt at this time, he is on his way to visit and he will discuss with pt.     SAUMYA perry

## 2021-03-30 NOTE — PLAN OF CARE
No acute changes. 2D echo done today. Pt repositioned and incontinent care provided. All safety measures in place. Continue to monitor.      Problem: PAIN - ADULT  Goal: Verbalizes/displays adequate comfort level or patient's stated pain goal  Description: standing, transferring and ambulating w/ or w/o assistive devices  - Assist with transfers and ambulation using safe patient handling equipment as needed  - Ensure adequate protection for wounds/incisions during mobilization  - Obtain PT/OT consults as nee

## 2021-03-31 VITALS
HEART RATE: 71 BPM | HEIGHT: 67 IN | BODY MASS INDEX: 38.76 KG/M2 | TEMPERATURE: 97 F | WEIGHT: 246.94 LBS | OXYGEN SATURATION: 95 % | DIASTOLIC BLOOD PRESSURE: 66 MMHG | SYSTOLIC BLOOD PRESSURE: 148 MMHG | RESPIRATION RATE: 18 BRPM

## 2021-03-31 RX ORDER — CIPROFLOXACIN 500 MG/1
500 TABLET, FILM COATED ORAL DAILY
Qty: 4 TABLET | Refills: 0 | Status: SHIPPED | OUTPATIENT
Start: 2021-04-01 | End: 2021-04-01

## 2021-03-31 RX ORDER — ALPRAZOLAM 0.5 MG/1
0.5 TABLET ORAL 2 TIMES DAILY PRN
Qty: 3 TABLET | Refills: 0 | Status: ON HOLD | OUTPATIENT
Start: 2021-03-31 | End: 2021-04-05

## 2021-03-31 RX ORDER — HYDROCODONE BITARTRATE AND ACETAMINOPHEN 10; 325 MG/1; MG/1
2 TABLET ORAL EVERY 6 HOURS PRN
Qty: 5 TABLET | Refills: 0 | Status: ON HOLD | OUTPATIENT
Start: 2021-03-31 | End: 2021-04-05

## 2021-03-31 RX ORDER — LEVOTHYROXINE SODIUM 0.12 MG/1
125 TABLET ORAL
Qty: 30 TABLET | Refills: 0 | Status: SHIPPED | OUTPATIENT
Start: 2021-03-31 | End: 2021-10-19

## 2021-03-31 NOTE — PROGRESS NOTES
Discharge RN Summary: Patient has discharge order in. Patient to discharge [place] [address]. IV removed by this RN. Transfer report to be given by RN Angelica Wilkerson.         Scripts sent with pt: xanax, cipro, norco  Electronically sent prescriptions: none

## 2021-03-31 NOTE — PLAN OF CARE
Pt in stable condition. Tenderness to right leg is pt baseline. Tolerating diet. Updates on care and abx therapy discussed with pt and brother at bedside. IV taken out. Discharging to [facility] at 3pm. Report given to [facility] nurse Gli Flores.     Problem: Katy Toscano precautions as indicated by assessment.  - Educate pt/family on patient safety including physical limitations  - Instruct pt to call for assistance with activity based on assessment  - Modify environment to reduce risk of injury  - Provide assistive device

## 2021-03-31 NOTE — PROGRESS NOTES
[facility]    Cardiology Progress Note    Kaela Hatch Patient Status:  Observation    1940 MRN [id number]   Location [facility] 5SW/SE Attending Mamta Richardson MD   Hosp Day # 0 PCP Lilian Ding MD       Impression/Plan: Urinary tract infection without hematuria, site unspecified      Objective:   Temp: 97.4 °F (36.3 °C)  Pulse: 71  Resp: 18  BP: 148/66    Intake/Output:     Intake/Output Summary (Last 24 hours) at 3/31/2021 1040  Last data filed at 3/31/2021 1010  Gross p input(s): ALT, AST, ALB, AMYLASE, LIPASE, LDH in the last 168 hours. Invalid input(s): ALPHOS, TBIL, DBIL, TPROT    No results for input(s): TROP in the last 168 hours.     Allergies:     Allegra-D               ANXIETY  Benadryl [Banophen]     ANXIETY

## 2021-03-31 NOTE — PLAN OF CARE
No acute changes. Pain maintained with Norco. Safety precautions maintained. Incontinent care provided. Continue to monitor.   Problem: Patient Centered Care  Goal: Patient preferences are identified and integrated in the patient's plan of care  Description Progressing     Problem: RISK FOR INFECTION - ADULT  Goal: Absence of fever/infection during anticipated neutropenic period  Description: INTERVENTIONS  - Monitor WBC  - Administer growth factors as ordered  - Implement neutropenic guidelines  Outcome: Pro

## 2021-03-31 NOTE — OCCUPATIONAL THERAPY NOTE
Pt not seen for OT at this time secondary to adamantly refusing. Pt stating waiting for brother and will be discharged later.

## 2021-03-31 NOTE — DISCHARGE SUMMARY
General Medicine Discharge Summary     Patient ID:  Rosana Daly  80year old  2/16/1940    Admit date: 3/28/2021    Discharge date and time: 3/31/2021    Attending Physician: Niko Kruse MD     Consults: IP CONSULT TO HOSPITALIST  IP CONSULT TO SOCIAL Keflex upon completion of antibiotics.   Patient with improvement in symptoms however remained generally weak and physical therapy recommending subacute rehab, therefore she was agreeable to going to [facility] rehab for further therapy.     Generalized weak Tabs     atorvastatin 10 MG Tabs  Commonly known as: LIPITOR  Take 1 tablet (10 mg total) by mouth nightly. ayr saline nasal Gel     cephALEXin 250 MG Caps  Commonly known as: KEFLEX  Take 1 capsule (250 mg total) by mouth every evening.      docusate s Hospitalist  Pager

## 2021-03-31 NOTE — CM/SW NOTE
MDO for dc. PAS is complete. PLAN  [facility], 3pm  [facility], pcs done  RN report [phone]    Brother/ pt notified. / to remain available for support and/or discharge planning.      Pina Mar RN    Ext 1

## 2021-04-01 ENCOUNTER — APPOINTMENT (OUTPATIENT)
Dept: ULTRASOUND IMAGING | Facility: HOSPITAL | Age: 81
DRG: 602 | End: 2021-04-01
Attending: EMERGENCY MEDICINE
Payer: MEDICARE

## 2021-04-01 ENCOUNTER — APPOINTMENT (OUTPATIENT)
Dept: CT IMAGING | Facility: HOSPITAL | Age: 81
DRG: 602 | End: 2021-04-01
Attending: EMERGENCY MEDICINE
Payer: MEDICARE

## 2021-04-01 ENCOUNTER — HOSPITAL ENCOUNTER (INPATIENT)
Facility: HOSPITAL | Age: 81
LOS: 4 days | Discharge: NURSING, REHAB OR LONG-TERM CARE | DRG: 602 | End: 2021-04-05
Attending: EMERGENCY MEDICINE | Admitting: HOSPITALIST
Payer: MEDICARE

## 2021-04-01 DIAGNOSIS — G92.8 TOXIC METABOLIC ENCEPHALOPATHY: ICD-10-CM

## 2021-04-01 DIAGNOSIS — R41.82 ALTERED MENTAL STATUS, UNSPECIFIED ALTERED MENTAL STATUS TYPE: ICD-10-CM

## 2021-04-01 DIAGNOSIS — N39.0 RECURRENT UTI: ICD-10-CM

## 2021-04-01 DIAGNOSIS — L03.115 CELLULITIS OF RIGHT LEG: Primary | ICD-10-CM

## 2021-04-01 PROCEDURE — 93971 EXTREMITY STUDY: CPT | Performed by: EMERGENCY MEDICINE

## 2021-04-01 PROCEDURE — 70450 CT HEAD/BRAIN W/O DYE: CPT | Performed by: EMERGENCY MEDICINE

## 2021-04-01 PROCEDURE — 74176 CT ABD & PELVIS W/O CONTRAST: CPT | Performed by: EMERGENCY MEDICINE

## 2021-04-01 RX ORDER — ATORVASTATIN CALCIUM 10 MG/1
10 TABLET, FILM COATED ORAL NIGHTLY
Status: DISCONTINUED | OUTPATIENT
Start: 2021-04-01 | End: 2021-04-05

## 2021-04-01 RX ORDER — ALPRAZOLAM 0.5 MG/1
0.5 TABLET ORAL 2 TIMES DAILY PRN
Status: DISCONTINUED | OUTPATIENT
Start: 2021-04-01 | End: 2021-04-05

## 2021-04-01 RX ORDER — MELATONIN
325
Status: DISCONTINUED | OUTPATIENT
Start: 2021-04-02 | End: 2021-04-05

## 2021-04-01 RX ORDER — POLYETHYLENE GLYCOL 3350 17 G/17G
17 POWDER, FOR SOLUTION ORAL DAILY PRN
Status: DISCONTINUED | OUTPATIENT
Start: 2021-04-01 | End: 2021-04-05

## 2021-04-01 RX ORDER — ONDANSETRON 2 MG/ML
INJECTION INTRAMUSCULAR; INTRAVENOUS
Status: COMPLETED
Start: 2021-04-01 | End: 2021-04-01

## 2021-04-01 RX ORDER — BISACODYL 10 MG
10 SUPPOSITORY, RECTAL RECTAL
Status: DISCONTINUED | OUTPATIENT
Start: 2021-04-01 | End: 2021-04-05

## 2021-04-01 RX ORDER — VANCOMYCIN HYDROCHLORIDE
15
Status: DISCONTINUED | OUTPATIENT
Start: 2021-04-03 | End: 2021-04-02

## 2021-04-01 RX ORDER — LITHIUM CARBONATE 300 MG/1
300 TABLET, FILM COATED, EXTENDED RELEASE ORAL DAILY
Status: DISCONTINUED | OUTPATIENT
Start: 2021-04-02 | End: 2021-04-05

## 2021-04-01 RX ORDER — HYDROCODONE BITARTRATE AND ACETAMINOPHEN 10; 325 MG/1; MG/1
1 TABLET ORAL EVERY 6 HOURS PRN
Status: DISCONTINUED | OUTPATIENT
Start: 2021-04-01 | End: 2021-04-05

## 2021-04-01 RX ORDER — ASPIRIN 81 MG/1
81 TABLET ORAL DAILY
Status: DISCONTINUED | OUTPATIENT
Start: 2021-04-02 | End: 2021-04-05

## 2021-04-01 RX ORDER — SODIUM CHLORIDE/ALOE VERA
1 GEL (GRAM) NASAL AS NEEDED
Status: DISCONTINUED | OUTPATIENT
Start: 2021-04-01 | End: 2021-04-05

## 2021-04-01 RX ORDER — ONDANSETRON 2 MG/ML
4 INJECTION INTRAMUSCULAR; INTRAVENOUS ONCE
Status: COMPLETED | OUTPATIENT
Start: 2021-04-01 | End: 2021-04-01

## 2021-04-01 RX ORDER — HEPARIN SODIUM 5000 [USP'U]/ML
5000 INJECTION, SOLUTION INTRAVENOUS; SUBCUTANEOUS EVERY 8 HOURS SCHEDULED
Status: DISCONTINUED | OUTPATIENT
Start: 2021-04-01 | End: 2021-04-05

## 2021-04-01 RX ORDER — CHOLECALCIFEROL (VITAMIN D3) 125 MCG
1000 CAPSULE ORAL DAILY
Status: DISCONTINUED | OUTPATIENT
Start: 2021-04-02 | End: 2021-04-05

## 2021-04-01 RX ORDER — METOCLOPRAMIDE HYDROCHLORIDE 5 MG/ML
5 INJECTION INTRAMUSCULAR; INTRAVENOUS EVERY 8 HOURS PRN
Status: DISCONTINUED | OUTPATIENT
Start: 2021-04-01 | End: 2021-04-05

## 2021-04-01 RX ORDER — ONDANSETRON 2 MG/ML
4 INJECTION INTRAMUSCULAR; INTRAVENOUS EVERY 6 HOURS PRN
Status: DISCONTINUED | OUTPATIENT
Start: 2021-04-01 | End: 2021-04-05

## 2021-04-01 RX ORDER — ACETAMINOPHEN 325 MG/1
650 TABLET ORAL EVERY 6 HOURS PRN
Status: DISCONTINUED | OUTPATIENT
Start: 2021-04-01 | End: 2021-04-05

## 2021-04-01 RX ORDER — VANCOMYCIN 2 GRAM/500 ML IN 0.9 % SODIUM CHLORIDE INTRAVENOUS
25 ONCE
Status: COMPLETED | OUTPATIENT
Start: 2021-04-01 | End: 2021-04-01

## 2021-04-01 RX ORDER — AMITRIPTYLINE HYDROCHLORIDE 25 MG/1
50 TABLET, FILM COATED ORAL NIGHTLY
Status: DISCONTINUED | OUTPATIENT
Start: 2021-04-01 | End: 2021-04-05

## 2021-04-01 NOTE — ED PROVIDER NOTES
Patient Seen in: [facility] Emergency Department    History   Patient presents with:  Altered Mental Status    Stated Complaint: ams     HPI    81 yo F with PMH bipolar on lithium, hypothyroid, HTN, HL, CAD, CKD presenting for evaluation from [facility] MD Arturo at [address] [facility] Right    • GENICULAR NERVE BLOCK Right 1/8/2018    Performed by Rubina Heller MD at [address]   • LUCIA LOCALIZATION WIRE 1 SITE LEFT (CPT=19281)     • OTHER SURGICAL HISTORY  200 Yes      Alcohol/week: 1.0 standard drinks      Types: 1 Glasses of wine per week      Comment: occ      Drug use: No      Review of Systems : Limited by clinical condition  Constitutional: As per HPI    Positive for stated complaint: ams  Other systems ar following components:    POC Glucose  120 (*)     All other components within normal limits   CBC W/ DIFFERENTIAL - Abnormal; Notable for the following components:    .2 (*)     MCHC 30.5 (*)     RDW-SD 48.2 (*)     All other components within cas (CPT=70450)  COMPARISON: [facility], CT BRAIN OR HEAD (CPT=70450), 2/13/2021, 12:46 PM.  INDICATIONS: Altered mental status  TECHNIQUE: CT images were obtained without contrast material.  Automated exposure control for dose [facility] ([facility] 7/27/2020, 11:04 AM.  INDICATIONS:  Right leg pain and swelling  TECHNIQUE: Color duplex Doppler venous ultrasound of the right lower extremity was performed in the usual manner.   FINDINGS: The femoral and popliteal veins appear fraction    was 55%, by biplane method of disks. Wall motion was normal;    there were no regional wall motion abnormalities. Doppler    parameters are consistent with abnormal left ventricular    relaxation (grade 1 diastolic dysfunction).  2. Aortic valve Aortic root: The aortic root was normal in size. Mitral valve:   Mildly calcified annulus. Normal thickened leaflets. Mobility was not restricted. Doppler: There was no evidence for stenosis. Mild regurgitation. Peak gradient (D): 3mm Hg.  Left atr end-diastolic                 39    ml/m^2 ---------- 35 - 75  volume/bsa, Tesarai MM  LV e', lateral                   6.96  cm/sec ---------- ---------  LV E/e', lateral                 11.9         ---------- ---------  LV e', medial mm Hg  ---------- ---------   Right ventricle                  Value        01/09/2013 Reference  RV pressure, S, DP               26    mm Hg  ---------- --------- Legend: (L)  and  (H)  ruthie values outside specified reference range.  Electronically sig aneurysm. RETROPERITONEUM: No mass or enlarged adenopathy. BOWEL/MESENTERY:  There is no small or large bowel obstruction. The terminal ileum and appendix (series 2, image 102) are within normal limits.  There is descending and sigmoid colonic diverticulos DIAGNOSIS: After history and physical exam differential diagnosis includes but is not limited to adverse medication (quinolone) side effect, intracerebral hemorrhage/contusion, lithium toxicity, electrolyte derangement, toxic metabolic encephalopathy, cell

## 2021-04-01 NOTE — ED INITIAL ASSESSMENT (HPI)
Pt to ED via EMS with c/o AMS that started this am. Per EMS pt states blood sugar 101 prior to arrival. Per EMS pt usually alert and oriented x4. Pt currently alert and oriented x2-3. Pt discharged from [address] [place] [address] yesterday. +UTI currently on CIPRO.  Pt denies cou

## 2021-04-02 PROCEDURE — 99223 1ST HOSP IP/OBS HIGH 75: CPT | Performed by: OTHER

## 2021-04-02 NOTE — PLAN OF CARE
Problem: Patient Centered Care  Goal: Patient preferences are identified and integrated in the patient's plan of care  Description: Interventions:  - What would you like us to know as we care for you?   - Provide timely, complete, and accurate informatio interventions as ordered  Outcome: Progressing  Goal: Achieves maximal functionality and self care  Description: INTERVENTIONS  - Monitor swallowing and airway patency with patient fatigue and changes in neurological status  - Encourage and assist patient

## 2021-04-02 NOTE — PLAN OF CARE
Admission and med rec completed with patient to best of ability. Patient's brother, Quinten Douglas was at bedside and was able to provide med rec and assist with additional questions. Care endorsed to primary RN.

## 2021-04-02 NOTE — H&P
[facility] Hospitalist H&P     CC: Patient presents with:  Altered Mental Status       PCP: Elizabeth Tirado MD      Assessment and Plan   Ulices Torres is a 80year old female with history of CKD stage 4, CAD, HTN, hypothyroidism, depression, anxiety, bipolar ER    Thank Rudell Simmonds MD  [facility] Hospitalist    Answering Service number: [phone]    HPI     Ulices Torres is a 80year old female with history of CKD stage 4, CAD, HTN, hypothyroidism, depression, anxiety, bipolar DO on lithium who was ju attack) 1/2013   • UTI (lower urinary tract infection)    • Visual impairment     reading glasses        PSH  Past Surgical History:   Procedure Laterality Date   • BENIGN BIOPSY LEFT      over 10 years ago   • CHOLECYSTECTOMY  2001   • COLONOSCOPY  2006 present  MSK:  no clubbing, no cyanosis.   No Lower extremity edema but does have area of ecchymosis over right shin and below area of erythema without marked warmth   Psych: cooperative, follows commands  Neuro: see above but no facial droop, no focal weak right lower quadrant anterior abdominal wall likely representing a small hematoma possibly sequela of medical injection from heparin or insulin. Correlate with procedure/trauma history. 2.  No urinary stones or obstructive uropathy.   Left renal cyst.  José

## 2021-04-02 NOTE — PLAN OF CARE
Problem: Patient Centered Care  Goal: Patient preferences are identified and integrated in the patient's plan of care  Description: Interventions:  - What would you like us to know as we care for you?   - Provide timely, complete, and accurate informatio interventions as ordered  Outcome: Not Progressing  Goal: Achieves maximal functionality and self care  Description: INTERVENTIONS  - Monitor swallowing and airway patency with patient fatigue and changes in neurological status  - Encourage and assist jorden

## 2021-04-02 NOTE — CONSULTS
Lino Patient Status:  Inpatient    1940 MRN [id number]   Location [facility] 5SW/SE Attending Celeste Manzano, [address] [facility] Day # 1 PCP Shayy Chong MD     Reason for [facility] MD at [address] [facility] ENDOSCOPY   • ESOPHAGOGASTRODUODENOSCOPY, POSSIBLE BIOPSY, POSSIBLE POLYPECTOMY 13558 N/A 1/7/2020    Performed by Janes Kinney MD at [facility]   • FOOT SURGERY Right    • GENICULAR NERVE BLOCK Right 1/8/2018    Performed by Gavin Renee PRN  •  Levothyroxine Sodium tab 125 mcg, 125 mcg, Oral, Before breakfast  •  Bradshaw Carbonate ER (LITHOBID) CR tab 300 mg, 300 mg, Oral, Daily  •  Vitamin B-12 (VITAMIN B12) tab 1,000 mcg, 1,000 mcg, Oral, Daily  •  [START ON 4/3/2021] vancomycin IVPB pr 04/01/21 1845 157/86 — — 70 20 99 % — —   04/01/21 1745 153/75 — — 70 23 98 % — —   04/01/21 1730 154/70 — — 72 18 98 % — —   04/01/21 1715 138/76 — — 73 23 — — —   04/01/21 1609 157/80 98.1 °F (36.7 °C) — 72 20 96 % 5' 7\" (1.702 m) 230 lb (104.3 kg) biliary ductal dilatation. 4.  Chronic disc disease and osteoarthritis in the spine most advanced at L3-L5. 5.  Shallow right periumbilical hernia containing a short segment of ileum without obstruction or inflammation. 6.  Coronary atherosclerosis. will also get Procal and CRP,   - Supportive care,     2. Psych disorder: but completely with it, Lithium level wnl, Psych on board,     3.    Disposition:inhouse, Will dc IV Vanc, continue IV Ceftriaxone, will get UA and cul, Procal level, Discussed with

## 2021-04-02 NOTE — PROGRESS NOTES
[facility] Dosing Service    Initial Pharmacokinetic Consult for Vancomycin Dosing     Elina Apodaca is a 80year old patient who is being treated for cellulitis.   Pharmacy has been asked to dose Vancomycin by Dr. Baca Better:  GIOVANNA Rico

## 2021-04-02 NOTE — OCCUPATIONAL THERAPY NOTE
RN Gino Cortez cleared pt for OT evaluation, however pt and spouse declined at this time. Per spouse pt has gone through a lot recently, may be best to allow pt to rest today, both agreeable to tomorrow.

## 2021-04-02 NOTE — ED NOTES
Orders for admission, patient is aware of plan and ready to go upstairs. Any questions, please call ED RN Andrew Yancey  at extension [phone].    Type of COVID test sent:rapid negative  COVID Suspicion level: Low    Titratable drug(s) infusing:Vancomycin infusing

## 2021-04-02 NOTE — CONSULTS
[facility]  [facility], [address] [place] [address]  [phone] [address]    Report of Consultation  Marylene Emory Patient Status:  Inpatient     1940 MRN H Complex regional pain syndrome of right lower extremity    • Coronary atherosclerosis    • Depression    • Disorder of thyroid    • High cholesterol    • HOSPITALIZATIONS 2006    lithium toxicity   • HOSPITALIZATIONS 2008    cardiac cath, stenting of OM Types: 1 Glasses of wine per week        Comment: occ        Drug use: No      Sexual activity: Not on file    Other Topics      Concerns:         Service: Not Asked        Blood Transfusions: Not Asked        Caffeine Concern: No        Occupati repetition intact. Phrase length and rate are normal. No paraphasic errors, neologisms, anomia, acalculia, apraxia, anosognosia, or R/L confusion.   - Cranial Nerves: No gaze preference.  Visual fields:full  Pupils are 4mm briskly constricting to 3mm and eq g Intravenous Q24H   • Heparin Sodium (Porcine)  5,000 Units Subcutaneous Q8H [facility]   • Amitriptyline HCl  50 mg Oral Nightly   • aspirin  81 mg Oral Daily   • atorvastatin  10 mg Oral Nightly   • ferrous sulfate  325 mg Oral Daily with breakfast   • Levothy 12/27/2019    T4F 0.6 (L) 03/28/2021    TSH 5.750 (H) 03/28/2021    DDIMER 0.67 (H) 07/25/2017    ESRML 19 04/10/2018    CRP 0.15 04/10/2018    MG 2.1 03/31/2021    PHOS 3.80 12/01/2020    TROP <0.045 04/01/2021    CK 84 04/01/2021    B12 1,847 (H) 03/26/2 Ref Range    POC Glucose  120 (H) 70 - 99 mg/dL   RAINBOW DRAW BLUE    Collection Time: 04/01/21  4:21 PM   Result Value Ref Range    Hold Blue Auto Resulted    RAINBOW DRAW LAVENDER    Collection Time: 04/01/21  4:21 PM   Result Value Ref Range    Hold La 4.00 x10(3) uL    Monocyte Absolute 0.49 0.10 - 1.00 x10(3) uL    Eosinophil Absolute 0.25 0.00 - 0.70 x10(3) uL    Basophil Absolute 0.02 0.00 - 0.20 x10(3) uL    Immature Granulocyte Absolute 0.05 0.00 - 1.00 x10(3) uL    Neutrophil % 71.9 %    Lymphocyt Neutrophil Absolute 4.86 1.50 - 7.70 x10(3) uL    Lymphocyte Absolute 1.66 1.00 - 4.00 x10(3) uL    Monocyte Absolute 0.45 0.10 - 1.00 x10(3) uL    Eosinophil Absolute 0.31 0.00 - 0.70 x10(3) uL    Basophil Absolute 0.02 0.00 - 0.20 x10(3) uL    Immatur segment of ileum without obstruction or inflammation. 6.  Coronary atherosclerosis. 7.  Colonic diverticulosis.   Dictated by (CST): Bowen Richmond MD on 4/01/2021 at 4:53 PM     Finalized by (CST): Bowen Richmond MD on 4/01/2021 at 4:58 PM          EKG 12-LEAD events and has no signs of UTI and no antibiotics on board  Therapeutics:  1. Neuro checks Q4.   2. Per primary    1. Education/Instructions given to: patient and brother   Barriers to Learning:None  Content: Refer to note above.  Evaluation/Outcome

## 2021-04-02 NOTE — CM/SW NOTE
Pt readmitted 1 day after dc to [facility] with acute AMS. DC on po Cipro for UTI, possible rxn to med, possible new cellulitis. Neuro and ID consulted. Clinical updates added to aidin ref.  No new DON/PAS required    PLAN  Return to [address] when med

## 2021-04-02 NOTE — PHYSICAL THERAPY NOTE
PT evaluation orders received and chart reviewed. Attempt x 2. Initial attempt patient sleeping after restless night. 2nd attempt pt and spouse declined.  Per spouse pt has gone through a lot recently, may be best to allow pt to rest today, both agreeable t

## 2021-04-03 PROCEDURE — 99232 SBSQ HOSP IP/OBS MODERATE 35: CPT | Performed by: OTHER

## 2021-04-03 NOTE — PROGRESS NOTES
[facility] Hospitalist Progress Note     CC: Hospital Follow up    PCP: Aaron Angel MD       Assessment/Plan:     Principal Problem:    Cellulitis of right leg  Active Problems:    Altered mental status, unspecified altered mental status type    Toxic metaboli HSQ     Dispo DNAR select       Patient and/or patient's family given opportunity to ask questions and note understanding and agreeing with therapeutic plan as outlined    Discussed with brother at bedside.     Thank You,  Cordelia Hoffmann DO  [facility] 29.9* 30.2*   RDW 12.5 12.6 12.8   NEPRELIM 6.73 4.86 6.55   WBC 9.4 7.3 10.9   .0 211.0 257.0         Recent Labs   Lab 04/01/21  1621 04/02/21  0541 04/03/21  0901   * 106* 92   BUN 15 15 21*   CREATSERUM 2.08* 1.98* 2.16*   GFRAA 25* 27* 2 MD on 4/01/2021 at 4:53 PM     Finalized by (CST): Manuelito Fischer MD on 4/01/2021 at 4:58 PM              Meds:     • cefTRIAXone  1 g Intravenous Q24H   • Heparin Sodium (Porcine)  5,000 Units Subcutaneous Q8H [address]   • Amitriptyline HCl  50 mg Oral Nightly

## 2021-04-03 NOTE — PHYSICAL THERAPY NOTE
PHYSICAL THERAPY EVALUATION - INPATIENT     Room Number: 553/553-A  Evaluation Date: 4/3/2021  Type of Evaluation: Initial   Physician Order: PT Eval and Treat    Presenting Problem: AMS, acute encephalopathy, RLE cellulitis, recurrent UTI  Reason for The which are below the patient's pre-admission status. The patient's Approx Degree of Impairment: 57.7% has been calculated based on documentation in the [facility] '6 clicks' Inpatient Basic Mobility Short Form.   Research supports that patients with this level of 1/2013   • UTI (lower urinary tract infection)    • Visual impairment     reading glasses     Past Surgical History  Past Surgical History:   Procedure Laterality Date   • BENIGN BIOPSY LEFT      over 10 years ago   • CHOLECYSTECTOMY  2001   • COLONOSCOPY Sitting: Fair +  Dynamic Sitting: Fair  Static Standing: Fair -  Dynamic Standing: Fair -    ACTIVITY TOLERANCE  Pre-activity, supine:  SPO2 98% on room air  HR 75 bpm  BP /76 mmHg  *mild dizziness seated EOB, resolved with 2 min rest    AM-PAC '6-C #2 Patient is able to demonstrate transfers Sit to/from Stand at assistance level: supervision with walker - rolling     Goal #2  Current Status    Goal #3 Patient is able to ambulate 100 feet with assist device: walker - rolling at assistance level: CGA

## 2021-04-03 NOTE — CONSULTS
[facility]      Report of Psychiatric Consultation    Dawood Price Patient Status:  Inpatient    1940 MRN [id number]   Location [facility] 5SW/SE Attending Sonam Mcmullen, [address] [facility] Day # 2 PCP Ryan Manrique MD     DATE returned to the [facility] ED, and was readmitted. Lithium level was 1.0 but it is unclear when she took her prior dose before the blood draw. Brother reports today that patient is quite improved and that he \"has his sister back\".   Per nursing, Mother 61   • Breast Cancer Maternal Aunt       reports that she has never smoked. She has never used smokeless tobacco. She reports current alcohol use of about 1.0 standard drinks of alcohol per week. She reports that she does not use drugs.       PAST PS Levothyroxine Sodium tab 125 mcg, 125 mcg, Oral, Before breakfast  •  [place] Carbonate ER (LITHOBID) CR tab 300 mg, 300 mg, Oral, Daily  •  Vitamin B-12 (VITAMIN B12) tab 1,000 mcg, 1,000 mcg, Oral, Daily    REVIEW OF SYSTEMS:  Per admitting attending by (CST): Jose Max MD on 4/01/2021 at 7:03 PM          CT ABDOMEN+PELVIS Lucia Perdue 2D RNDR(NO IV,NO ORAL)(CPT=74176)    Result Date: 4/1/2021  CONCLUSION:  1.  18 mm diameter nodular soft tissue focus in the right lower quadrant anterior abdom addition, poor renal function can increase the risk of lithium toxicity.   Ms. Dagmar López and her brother understood my recommendations, but she was not interested in making any changes in her psychotropic drug regimen at present, but she will discuss this with

## 2021-04-03 NOTE — CM/SW NOTE
SW following for discharge planning. Per chart review, patient admitted from [facility].     SW received call from patient's brother, Antoine Quinn, who reports that he and patient are requesting alternative KATY and requesting referral be sent to [facility]ICE [facility], [facility]   [id number]

## 2021-04-03 NOTE — PROGRESS NOTES
Yury Barth is a 80year old female with history of CKD stage 4, CAD, HTN, with recent admission at [address] [place] [address] 3/28-3/31 for weakness and Ecoli UTI and discharged to [facility] [place] on po cipro and readmitted to [address] [place] [address] on 4/01 with AMS, Mental status improved and edward thyroid    • High cholesterol    • HOSPITALIZATIONS 2006    lithium toxicity   • HOSPITALIZATIONS 2008    cardiac cath, stenting of OM   • HYPERLIPIDEMIA    • HYPERTENSION    • HYPOTHYROIDISM    • Incontinence    • Muscle weakness    • Osteoarthritis    • mmol/L    BUN 15 7 - 18 mg/dL    Creatinine 2.08 (H) 0.55 - 1.02 mg/dL    BUN/CREA Ratio 7.2 (L) 10.0 - 20.0    Calcium, Total 9.7 8.5 - 10.1 mg/dL    Calculated Osmolality 297 (H) 275 - 295 mOsm/kg    GFR, Non- 22 (L) >=60    GFR, - (8)   Result Value Ref Range    Glucose 92 70 - 99 mg/dL    Sodium 144 136 - 145 mmol/L    Potassium 4.8 3.5 - 5.1 mmol/L    Chloride 119 (H) 98 - 112 mmol/L    CO2 21.0 21.0 - 32.0 mmol/L    Anion Gap 4 0 - 18 mmol/L    BUN 21 (H) 7 - 18 mg/dL    Creatini Granulocyte % 0.5 %   CBC W/ DIFFERENTIAL   Result Value Ref Range    WBC 7.3 4.0 - 11.0 x10(3) uL    RBC 3.62 (L) 3.80 - 5.30 x10(6)uL    HGB 11.3 (L) 12.0 - 16.0 g/dL    HCT 37.8 35.0 - 48.0 %    .4 (H) 80.0 - 100.0 fL    MCH 31.2 26.0 - 34.0 pg cough.  CARDIOVASCULAR:  Denies CP or palpitations. GI:  Denies abdominal pain. No nausea, vomiting, diarrhea, or constipation. :  Denies dysuria or hematuria. NEURO:  Denies headaches, photophobia, weakness or neurologic deficits.   DERM:  Denies argent[place] effects/ Interactions  of medications too, Patient understood and Agreed.

## 2021-04-03 NOTE — PHYSICAL THERAPY NOTE
Chart reviewed, attempted to see pt for PT eval. Pt asleep and has not received morning meds per RN. Requesting to f/u later this AM. Will f/u in future as appropriate, schedule permitting.     ANIBAL CortesT  Physical Therapy  [address] [facility]

## 2021-04-03 NOTE — PROGRESS NOTES
[facility] Hospitalist Progress Note     CC: Hospital Follow up    PCP: Luigi Jenkins MD       Assessment/Plan:     Principal Problem:    Cellulitis of right leg  Active Problems:    Altered mental status, unspecified altered mental status type    Toxic metaboli outlined    Disucssed with brother at bedside.     Thank You,  [address] [facility], [facility] Hospitalist     Answering Service number: [phone]       Subjective:     No CP, SOB, or N/V.     OBJECTIVE:    Blood pressure 128/65, pulse 63, temperature 97.7 °F ( GFRNAA 20* 22* 23*   CA 9.0 9.7 9.6    143 148*   K 4.5 4.7 4.9   * 115* 123*   CO2 18.0* 21.0 20.0*       Recent Labs   Lab 04/02/21  0541   ALT 8*   AST 9*   ALB 2.9*         Imaging:  CT BRAIN OR HEAD (57045)    Result Date: 4/1/2021  CONC aspirin  81 mg Oral Daily   • atorvastatin  10 mg Oral Nightly   • ferrous sulfate  325 mg Oral Daily with breakfast   • Levothyroxine Sodium  125 mcg Oral Before breakfast   • Lithium Carbonate ER  300 mg Oral Daily   • Vitamin B-12  1,000 mcg Oral Daily

## 2021-04-03 NOTE — PLAN OF CARE
No acute changes throughout the night. Patient received PRN xanax, remained anxious & restless. MD notified, no new orders. PRN norco for generalized pain. PRN zofran & PRN reglan given for nausea & emesis.  Very small amounts of emesis 3 times throughout t report changes in neurological status  - Initiate measures to prevent increased intracranial pressure  - Maintain blood pressure and fluid volume within ordered parameters to optimize cerebral perfusion and minimize risk of hemorrhage  - Monitor temperatur

## 2021-04-03 NOTE — OCCUPATIONAL THERAPY NOTE
OCCUPATIONAL THERAPY EVALUATION - INPATIENT     Room Number: 553/553-A  Evaluation Date: 4/3/2021  Type of Evaluation: Initial  Presenting Problem:  (RLE cellulitis, AMS, acute encephalopathy )    Physician Order: IP Consult to Occupational Therapy  Reason stands  . The patient is below baseline and would benefit from skilled inpatient OT to address the above deficits, maximizing patient's ability to return to prior level of function.     The patient's Approx Degree of Impairment: 56.46% has been calculated glasses       Past Surgical History  Past Surgical History:   Procedure Laterality Date   • BENIGN BIOPSY LEFT      over 10 years ago   • CHOLECYSTECTOMY  2001   • COLONOSCOPY  2006    repeat 2011   • COLONOSCOPY     • COLONOSCOPY N/A 4/1/2019    Performed within functional limits    STRENGTH ASSESSMENT  Upper extremity strength is within functional limits; unable to formally assess BUE MMT at this time due to cognition     COORDINATION  Gross Motor: WFL   Fine Motor: WFL     ACTIVITIES OF DAILY LIVING ÓSCAR Andrei, OTR/L

## 2021-04-03 NOTE — OCCUPATIONAL THERAPY NOTE
Attempted to see pt for OT evaluation, however per VIOLETA Lujan pt is asleep and has not received morning medications and requesting to come back later. Will cont to follow.

## 2021-04-04 RX ORDER — MELATONIN
3 ONCE AS NEEDED
Status: COMPLETED | OUTPATIENT
Start: 2021-04-04 | End: 2021-04-04

## 2021-04-04 NOTE — PROGRESS NOTES
Elina Apodaca is a 80year old female with history of CKD stage 4, CAD, HTN, with recent admission at [facility] 3/28-3/31 for weakness and Ecoli UTI and discharged to [facility] on po cipro and readmitted to [facility] on 4/01 with AMS, Mental status improved and edward 2006    lithium toxicity   • HOSPITALIZATIONS 2008    cardiac cath, stenting of OM   • HYPERLIPIDEMIA    • HYPERTENSION    • HYPOTHYROIDISM    • Incontinence    • Muscle weakness    • Osteoarthritis    • OSTEOPENIA    • OTHER DISEASES     bipolar   • OTHER 0.55 - 1.02 mg/dL    BUN/CREA Ratio 7.2 (L) 10.0 - 20.0    Calcium, Total 9.7 8.5 - 10.1 mg/dL    Calculated Osmolality 297 (H) 275 - 295 mOsm/kg    GFR, Non- 22 (L) >=60    GFR, -American 25 (L) >=60   TROPONIN I   Result Value Ref mg/dL    Sodium 144 136 - 145 mmol/L    Potassium 4.8 3.5 - 5.1 mmol/L    Chloride 119 (H) 98 - 112 mmol/L    CO2 21.0 21.0 - 32.0 mmol/L    Anion Gap 4 0 - 18 mmol/L    BUN 21 (H) 7 - 18 mg/dL    Creatinine 2.16 (H) 0.55 - 1.02 mg/dL    BUN/CREA Ratio 9.7 Green Auto Resulted    RAINBOW DRAW GOLD   Result Value Ref Range    Hold Gold Auto Resulted    RAINBOW DRAW LIGHT GREEN   Result Value Ref Range    Hold Lt Green Auto Resulted    RAINBOW DRAW LAVENDER   Result Value Ref Range    Hold Lavender Auto Resulte 0.70 x10(3) uL    Basophil Absolute 0.02 0.00 - 0.20 x10(3) uL    Immature Granulocyte Absolute 0.04 0.00 - 1.00 x10(3) uL    Neutrophil % 66.3 %    Lymphocyte % 22.6 %    Monocyte % 6.1 %    Eosinophil % 4.2 %    Basophil % 0.3 %    Immature Granulocyte % Non-tox, non-septic and in NAD. HEENT:  Normocephalic, no scleral abnormalities. Oropharynx clear, trachea ML. NECK:  Supple, no masses, no lymphadenopathy. LUNGS:  Clear to auscultation b/l, no rhonchi, rales, or wheezes.   CARDIO: RRR S1/S2, no rubs,

## 2021-04-04 NOTE — PLAN OF CARE
Pateint A/Ox4, anxious and agitated at times. C/O pain, relieved with PRN pain meds. Continent of bladder elimination. C/O difficulty sleeping, received order for one time dose PRN melatonin. Needs attended to.     Problem: Patient Centered Care  Goal: Tamiko pressure  - Maintain blood pressure and fluid volume within ordered parameters to optimize cerebral perfusion and minimize risk of hemorrhage  - Monitor temperature, glucose, and sodium.  Initiate appropriate interventions as ordered  Outcome: Progressing

## 2021-04-04 NOTE — PROGRESS NOTES
[facility]    Progress Note    Rosana Daly Patient Status:  Inpatient    1940 MRN [id number]   [facility] 5SW/SE Attending Cameron Zimmerman, [address] [facility] Day # 2 PCP Ya Sandoval MD       Subjective:   Mk [place] with breakfast  HYDROcodone-acetaminophen (NORCO)  MG per tab 1 tablet, 1 tablet, Oral, Q6H PRN  Levothyroxine Sodium tab 125 mcg, 125 mcg, Oral, Before breakfast  [place] Carbonate ER (LITHOBID) CR tab 300 mg, 300 mg, Oral, Daily  Vitamin B-12 (JOSHUA

## 2021-04-04 NOTE — PLAN OF CARE
Norco given PRN for leg pain, tolerated well. No acute changes. Patient's brother, Jayla Morton, at bedside. Bed in lowest position, safety measures in place, and call light within reach.    Problem: Patient Centered Care  Goal: Patient preferences are identified pressure and fluid volume within ordered parameters to optimize cerebral perfusion and minimize risk of hemorrhage  - Monitor temperature, glucose, and sodium.  Initiate appropriate interventions as ordered  Outcome: Progressing  Goal: Achieves maximal func

## 2021-04-04 NOTE — PLAN OF CARE
No complaints of pain. No acute changes. Patient's brother, Kary Woody, at bedside for most of the day. Bed in lowest position, safety precautions in place, and call light within reach.    Problem: Patient Centered Care  Goal: Patient preferences are identified pressure and fluid volume within ordered parameters to optimize cerebral perfusion and minimize risk of hemorrhage  - Monitor temperature, glucose, and sodium.  Initiate appropriate interventions as ordered  Outcome: Progressing  Goal: Achieves maximal func

## 2021-04-04 NOTE — PROGRESS NOTES
[facility] Hospitalist Progress Note     CC: Hospital Follow up    PCP: Freda Kelley MD       Assessment/Plan:     Principal Problem:    Cellulitis of right leg  Active Problems:    Altered mental status, unspecified altered mental status type    Toxic metaboli IVF, lytes in AM, General diet     PPX; HSQ     Dispo: Awaiting KATY acceptance, DNAR select       Patient and/or patient's family given opportunity to ask questions and note understanding and agreeing with therapeutic plan as outlined    Discussed with bro HCT 40.0 37.8 40.7   .2* 104.4* 106.0*   MCH 31.8 31.2 32.0   MCHC 30.5* 29.9* 30.2*   RDW 12.5 12.6 12.8   NEPRELIM 6.73 4.86 6.55   WBC 9.4 7.3 10.9   .0 211.0 257.0         Recent Labs   Lab 04/02/21  0541 04/03/21  0901 04/04/21 2076

## 2021-04-05 VITALS
WEIGHT: 225.19 LBS | TEMPERATURE: 98 F | OXYGEN SATURATION: 100 % | BODY MASS INDEX: 35.34 KG/M2 | RESPIRATION RATE: 18 BRPM | HEIGHT: 67 IN | DIASTOLIC BLOOD PRESSURE: 61 MMHG | SYSTOLIC BLOOD PRESSURE: 130 MMHG | HEART RATE: 63 BPM

## 2021-04-05 PROCEDURE — 95816 EEG AWAKE AND DROWSY: CPT | Performed by: OTHER

## 2021-04-05 RX ORDER — CEFUROXIME AXETIL 500 MG/1
250 TABLET ORAL 2 TIMES DAILY
Qty: 3 TABLET | Refills: 0 | Status: SHIPPED | OUTPATIENT
Start: 2021-04-05 | End: 2021-04-08

## 2021-04-05 RX ORDER — HYDROCODONE BITARTRATE AND ACETAMINOPHEN 10; 325 MG/1; MG/1
2 TABLET ORAL EVERY 6 HOURS PRN
Qty: 5 TABLET | Refills: 0 | Status: SHIPPED | OUTPATIENT
Start: 2021-04-05

## 2021-04-05 RX ORDER — CEPHALEXIN 500 MG/1
500 CAPSULE ORAL EVERY EVENING
Qty: 30 CAPSULE | Refills: 0 | Status: SHIPPED | OUTPATIENT
Start: 2021-04-09 | End: 2021-05-09

## 2021-04-05 RX ORDER — ALPRAZOLAM 0.5 MG/1
0.5 TABLET ORAL 2 TIMES DAILY PRN
Qty: 5 TABLET | Refills: 0 | Status: SHIPPED | OUTPATIENT
Start: 2021-04-05

## 2021-04-05 NOTE — CONSULTS
[facility]    Report of Consultation    Jay Ordonze Patient Status:  Inpatient    1940 MRN [id number]   Location [facility] 5SW/SE Attending Yue Jarvis, [address] [facility] Day # 4 PCP Praneeth Sidhu MD     Date of Admission: at [address] [facility] ENDOSCOPY   • ESOPHAGOGASTRODUODENOSCOPY, POSSIBLE BIOPSY, POSSIBLE POLYPECTOMY [address]18 N/A 1/7/2020    Performed by Herve Frye MD at [facility]   • FOOT SURGERY Right    • GENICULAR NERVE BLOCK Right 1/8/2018    Performed by Shawna Mccoy, Carbonate ER (LITHOBID) CR tab 300 mg, 300 mg, Oral, Daily  Vitamin B-12 (VITAMIN B12) tab 1,000 mcg, 1,000 mcg, Oral, Daily      HYDROcodone-acetaminophen  MG Oral Tab, Take 2 tablets by mouth every 6 (six) hours as needed for Pain.  (Patient taking 2300  Gross per 24 hour   Intake 520 ml   Output —   Net 520 ml     Wt Readings from Last 1 Encounters:  04/05/21 : 225 lb 3.2 oz (102.2 kg)      Exam  Gen: No acute distress  Heent: NC AT, mucous memb clear, neck supple  Pulm: Lungs clear, normal respirat

## 2021-04-05 NOTE — PLAN OF CARE
Patient alert and verbally responsive, still anxious and agitated at times with staff. Received PRN pain pill and anxiety pill. Needs attended to.     Problem: Patient Centered Care  Goal: Patient preferences are identified and integrated in the patient's p volume within ordered parameters to optimize cerebral perfusion and minimize risk of hemorrhage  - Monitor temperature, glucose, and sodium.  Initiate appropriate interventions as ordered  Outcome: Progressing  Goal: Achieves maximal functionality and self

## 2021-04-05 NOTE — PROGRESS NOTES
[facility] AND [facility] Infectious Disease  Progress Note    Dennie Ice Patient Status:  Inpatient    1940 MRN [id number]   Location [facility] 5SW/SE Attending Laura Lockhart DO   Hosp Day # 4 PCP Cain Jackson MD     Subjective:  Reyes Markham

## 2021-04-06 NOTE — DISCHARGE SUMMARY
General Medicine Discharge Summary     Patient ID:  Emily Fitzgerald  80year old  2/16/1940    Admit date: 4/1/2021    Discharge date and time: 4/5/2021  2:39 PM      Attending Physician: Chary Ansari DO    Consults: IP CONSULT TO Sharita 2 Dominguez Arcos a 80year old female with history of CKD stage 4, CAD, HTN, hypothyroidism, depression, anxiety, bipolar DO on lithium who was just admitted from 3/28-3/31 for weakness and Ecoli UTI and discharged to [facility] on po cipro and prese current meds     Coronary artery disease  - continue asa, statin     Depression  - continue amitriptyline     Anxiety  - prn xanax       Disposition: SNF    Activity: activity as tolerated  Diet: cardiac diet  Wound Care: keep wound clean and dry  Code Sta Your Medications      These medications were sent to [facility], [address], [address] [place] [address] [address] [id number]    Phone: [phone]   · Cefuroxime Axetil 500 MG Tabs  · cephALEXin 500 MG Caps

## 2021-04-16 NOTE — PROGRESS NOTES
Virtual Telephone Check-In    Alison Santillan verbally consents to a Virtual/Telephone Check-In visit on 10/22/20. Patient has been referred to the [facility] website at [email or web address] to review the yearly Consent to Treat document.     [facility] unders [facility]  [facility]urological [facility]    Co-Director  [facility]  [address]

## 2021-08-07 ENCOUNTER — HOSPITAL ENCOUNTER (EMERGENCY)
Facility: HOSPITAL | Age: 81
Discharge: HOME | End: 2021-08-08
Attending: EMERGENCY MEDICINE
Payer: MEDICARE

## 2021-08-07 DIAGNOSIS — K59.00 CONSTIPATION, UNSPECIFIED CONSTIPATION TYPE: Primary | ICD-10-CM

## 2021-08-07 DIAGNOSIS — E87.1 HYPONATREMIA: ICD-10-CM

## 2021-08-07 LAB
ANION GAP SERPL CALC-SCNC: 3 MMOL/L (ref 0–18)
BASOPHILS # BLD AUTO: 0.03 X10(3) UL (ref 0–0.2)
BASOPHILS NFR BLD AUTO: 0.3 %
BUN BLD-MCNC: 26 MG/DL (ref 7–18)
BUN/CREAT SERPL: 11.3 (ref 10–20)
CALCIUM BLD-MCNC: 9.1 MG/DL (ref 8.5–10.1)
CHLORIDE SERPL-SCNC: 104 MMOL/L (ref 98–112)
CO2 SERPL-SCNC: 24 MMOL/L (ref 21–32)
CREAT BLD-MCNC: 2.31 MG/DL
DEPRECATED RDW RBC AUTO: 52.2 FL (ref 35.1–46.3)
EOSINOPHIL # BLD AUTO: 0.66 X10(3) UL (ref 0–0.7)
EOSINOPHIL NFR BLD AUTO: 5.8 %
ERYTHROCYTE [DISTWIDTH] IN BLOOD BY AUTOMATED COUNT: 13.8 % (ref 11–15)
GLUCOSE BLD-MCNC: 106 MG/DL (ref 70–99)
HCT VFR BLD AUTO: 37.2 %
HGB BLD-MCNC: 10.9 G/DL
IMM GRANULOCYTES # BLD AUTO: 0.03 X10(3) UL (ref 0–1)
IMM GRANULOCYTES NFR BLD: 0.3 %
LYMPHOCYTES # BLD AUTO: 3.2 X10(3) UL (ref 1–4)
LYMPHOCYTES NFR BLD AUTO: 28.2 %
MCH RBC QN AUTO: 30.1 PG (ref 26–34)
MCHC RBC AUTO-ENTMCNC: 29.3 G/DL (ref 31–37)
MCV RBC AUTO: 102.8 FL
MONOCYTES # BLD AUTO: 0.85 X10(3) UL (ref 0.1–1)
MONOCYTES NFR BLD AUTO: 7.5 %
NEUTROPHILS # BLD AUTO: 6.56 X10 (3) UL (ref 1.5–7.7)
NEUTROPHILS # BLD AUTO: 6.56 X10(3) UL (ref 1.5–7.7)
NEUTROPHILS NFR BLD AUTO: 57.9 %
OSMOLALITY SERPL CALC.SUM OF ELEC: 277 MOSM/KG (ref 275–295)
PLATELET # BLD AUTO: 257 10(3)UL (ref 150–450)
RBC # BLD AUTO: 3.62 X10(6)UL
SODIUM SERPL-SCNC: 131 MMOL/L (ref 136–145)
WBC # BLD AUTO: 11.3 X10(3) UL (ref 4–11)

## 2021-08-07 PROCEDURE — 80048 BASIC METABOLIC PNL TOTAL CA: CPT | Performed by: EMERGENCY MEDICINE

## 2021-08-07 PROCEDURE — 85025 COMPLETE CBC W/AUTO DIFF WBC: CPT | Performed by: EMERGENCY MEDICINE

## 2021-08-07 PROCEDURE — 36415 COLL VENOUS BLD VENIPUNCTURE: CPT

## 2021-08-07 PROCEDURE — 99285 EMERGENCY DEPT VISIT HI MDM: CPT

## 2021-08-08 ENCOUNTER — APPOINTMENT (OUTPATIENT)
Dept: CT IMAGING | Facility: HOSPITAL | Age: 81
End: 2021-08-08
Attending: EMERGENCY MEDICINE
Payer: MEDICARE

## 2021-08-08 VITALS
WEIGHT: 225 LBS | TEMPERATURE: 98 F | HEIGHT: 67 IN | DIASTOLIC BLOOD PRESSURE: 73 MMHG | HEART RATE: 86 BPM | SYSTOLIC BLOOD PRESSURE: 152 MMHG | BODY MASS INDEX: 35.31 KG/M2 | OXYGEN SATURATION: 93 % | RESPIRATION RATE: 20 BRPM

## 2021-08-08 PROBLEM — K59.00 CONSTIPATION: Status: ACTIVE | Noted: 2021-08-08

## 2021-08-08 LAB
AMPHET UR QL SCN: NEGATIVE
BARBITURATES UR QL SCN: NEGATIVE
CANNABINOIDS UR QL SCN: NEGATIVE
COCAINE UR QL: NEGATIVE
CREAT UR-SCNC: 39 MG/DL
MDMA UR QL SCN: NEGATIVE
METHADONE UR QL SCN: NEGATIVE
OXYCODONE UR QL SCN: NEGATIVE
PCP UR QL SCN: NEGATIVE
POTASSIUM SERPL-SCNC: 4.8 MMOL/L (ref 3.5–5.1)

## 2021-08-08 PROCEDURE — 80307 DRUG TEST PRSMV CHEM ANLYZR: CPT | Performed by: EMERGENCY MEDICINE

## 2021-08-08 PROCEDURE — 74176 CT ABD & PELVIS W/O CONTRAST: CPT | Performed by: EMERGENCY MEDICINE

## 2021-08-08 RX ORDER — MAGNESIUM CARB/ALUMINUM HYDROX 105-160MG
296 TABLET,CHEWABLE ORAL ONCE
Status: COMPLETED | OUTPATIENT
Start: 2021-08-08 | End: 2021-08-08

## 2021-08-08 NOTE — ED QUICK NOTES
[facility] called, no answer to give RN to RN report. Will send back pt with ED chart. Pt seen by crisis, dc with safety plan. Pt upset, all questions answered and pt educated on dc instructions.

## 2021-08-08 NOTE — ED PROVIDER NOTES
Patient Seen in: [facility] AND [facility] Emergency Department    History   Patient presents with:  Constipation      HPI    80 female presents the ER with complaint of constipation. Patient states she has not had a bowel movement for the past 3 to 5 days.   Tamiko Aunt        Smoking Status: Social History    Socioeconomic History      Marital status:        Spouse name: Not on file      Number of children: Not on file      Years of education: Not on file      Highest education level: Not on file    Tobacco Us Cardiovascular:      Rate and Rhythm: Normal rate and regular rhythm. Heart sounds: Normal heart sounds. Pulmonary:      Effort: Pulmonary effort is normal.      Breath sounds: Normal breath sounds.    Abdominal:      General: Bowel sounds are norm DIFFERENTIAL[[id number]]          Abnormal            Final result                 Please view results for these tests on the individual orders. Imaging Results Available and Reviewed while in ED: No results found.   ED Medications Administered:   Me diagnosis)  Hyponatremia    Disposition:  Discharge    Follow-up:  Huong Couch MD  [address] [place] [address] [phone]    Schedule an appointment as soon as possible for a visit  If symptoms worsen      Medications Prescribed:  D

## 2021-08-08 NOTE — ED INITIAL ASSESSMENT (HPI)
Patient arrives from snf with c/o constipation X3-4 days. Patient is from [facility]. Patient denies bleeding noted rectal pain and pressure.

## 2021-08-08 NOTE — ED PROVIDER NOTES
Signout taken with disposition pending CT results in setting of NH reporting patient to be noncompliant with stool regimen - same nonacute as below, stable for discharge as previously anticipated with stool regimen.  Patient vaguely with suicidal threats sh groundglass/reticulation, suspect for trace age-related pulmonary fibrosis. Case discussed with Yg Villagomez at 1:30 AM central standard time     Shelley Panda M.D.   This report has been electronically signed and verified by the Radiologist whose n

## 2021-08-08 NOTE — BH LEVEL OF CARE ASSESSMENT
Crisis Evaluation Assessment    Analy Holland YOB: 1940   Age 80year old MRN [id number]   Location [address] Attending No att. providers found      Patient's legal sex: female  Patient identifies as: female share a bathroom. \"  Patient reported \"I don't need to sign a safety plan; I'm not crazy. \"  Patient reported multiple social supports, and goals for the future, including recovery at physical rehabilitation facility so that patient can return home and ta Suicidal Ideation: Ideation  Describe: Patient reported at least 2 instances of ideation between age of 25and 36years of age. Patient reported starting Lithium \"in my 40's. \" Patient refused to speak on SI further, but stated, \"That was a different per suicide or harm others or property: No  Discussion of Removal of Access to Means: Patient denies.   Access to Firearm/Weapon: No  Do you have a firearm owner ID card?: No    Protective Factors:   Protective Factors: Brother, reading magazines including \"Re remission. Relevant Social History:  Patient is . 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   Patient reported brother is main support, uses a walker at rehabilitation facility, and presents as motivated to reg to take medications on occasion.)  Toileting  Patient Incontinence: None (Patient reported constipation and anal fissures. Patient reported that it feels \"heavy\" and that she wants medical staff to \"clear it out. \")  Special Considerations  Patient has control\" related to where to go and agreed to need for nursing home if stool removed from body. RN reported patient has no stool in body.)  Appropriateness of Affect: Congruent to mood; Appropriate to situation  Range of Affect: Animated  Stability of Affe Patient is an 80-year-old , white, female who presented to [facility] emergency department due to presenting complaint of constipation. Patient presented as alert and oriented x4, and presented as a guarded, yet detailed historian.   Wiliam CNN,\" watering plants. Level of Care Recommendations  Consulted with: Dr. Amparo Saucedo of Care Recommendation: Residential (Physical rehabilitation facility.)  Reason: Physical rehabilitation.  Continue outpatient resources for psychiatric needs

## 2021-08-08 NOTE — ED QUICK NOTES
Pt updated on plan of care, pt does not want to go back to [facility], pt states there is only one bathroom for 4 patients, states she waits more than an hour to be helped to use bathroom.  Pt requesting to speak to MD, pt states to this RN \" If I go ba

## 2021-08-08 NOTE — ED QUICK NOTES
PT BROUGHT FROM [facility] C/O CONSTIPATION.  W Edwige  FOR CHRONIC LEG PAIN AND DOES NOT TAKE A DAILY COLACE. SHE STATES SHE STUCK HER FINGER IN [place] BOTTOM AND IT STARTED TO BLEED. CALL LIGHT AT REACH, WILL CONTINUE TO MONITOR.

## 2021-08-08 NOTE — CM/SW NOTE
VOILETA Broussard from [facility] calling for update on patient - update given.  Per VIOLETA Broussard patient refuses everything they try to give her for constipation such as Miralax and pt insists on coming to hospital. [facility]. informed patient likely patient will be discharge

## 2021-11-02 ENCOUNTER — HOSPITAL ENCOUNTER (INPATIENT)
Facility: HOSPITAL | Age: 81
LOS: 6 days | Discharge: NURSING, REHAB OR LONG-TERM CARE | DRG: 871 | End: 2021-11-08
Attending: EMERGENCY MEDICINE | Admitting: HOSPITALIST
Payer: MEDICARE

## 2021-11-02 ENCOUNTER — APPOINTMENT (OUTPATIENT)
Dept: GENERAL RADIOLOGY | Facility: HOSPITAL | Age: 81
DRG: 871 | End: 2021-11-02
Attending: EMERGENCY MEDICINE
Payer: MEDICARE

## 2021-11-02 DIAGNOSIS — N39.0 SEPSIS DUE TO URINARY TRACT INFECTION (HCC): Primary | ICD-10-CM

## 2021-11-02 DIAGNOSIS — A41.9 SEPSIS DUE TO URINARY TRACT INFECTION (HCC): Primary | ICD-10-CM

## 2021-11-02 PROCEDURE — 99285 EMERGENCY DEPT VISIT HI MDM: CPT

## 2021-11-02 PROCEDURE — 87086 URINE CULTURE/COLONY COUNT: CPT | Performed by: EMERGENCY MEDICINE

## 2021-11-02 PROCEDURE — 81001 URINALYSIS AUTO W/SCOPE: CPT | Performed by: EMERGENCY MEDICINE

## 2021-11-02 PROCEDURE — 87186 SC STD MICRODIL/AGAR DIL: CPT | Performed by: EMERGENCY MEDICINE

## 2021-11-02 PROCEDURE — 36415 COLL VENOUS BLD VENIPUNCTURE: CPT

## 2021-11-02 PROCEDURE — 83605 ASSAY OF LACTIC ACID: CPT | Performed by: EMERGENCY MEDICINE

## 2021-11-02 PROCEDURE — 85025 COMPLETE CBC W/AUTO DIFF WBC: CPT | Performed by: EMERGENCY MEDICINE

## 2021-11-02 PROCEDURE — 87040 BLOOD CULTURE FOR BACTERIA: CPT | Performed by: EMERGENCY MEDICINE

## 2021-11-02 PROCEDURE — 71045 X-RAY EXAM CHEST 1 VIEW: CPT | Performed by: EMERGENCY MEDICINE

## 2021-11-02 PROCEDURE — 80048 BASIC METABOLIC PNL TOTAL CA: CPT | Performed by: EMERGENCY MEDICINE

## 2021-11-02 PROCEDURE — 83605 ASSAY OF LACTIC ACID: CPT | Performed by: HOSPITALIST

## 2021-11-02 PROCEDURE — 96365 THER/PROPH/DIAG IV INF INIT: CPT

## 2021-11-02 PROCEDURE — 87077 CULTURE AEROBIC IDENTIFY: CPT | Performed by: EMERGENCY MEDICINE

## 2021-11-02 RX ORDER — ACETAMINOPHEN 325 MG/1
650 TABLET ORAL EVERY 6 HOURS PRN
Status: DISCONTINUED | OUTPATIENT
Start: 2021-11-02 | End: 2021-11-08

## 2021-11-02 RX ORDER — AMITRIPTYLINE HYDROCHLORIDE 25 MG/1
50 TABLET, FILM COATED ORAL NIGHTLY
Status: DISCONTINUED | OUTPATIENT
Start: 2021-11-02 | End: 2021-11-08

## 2021-11-02 RX ORDER — ONDANSETRON 2 MG/ML
4 INJECTION INTRAMUSCULAR; INTRAVENOUS EVERY 6 HOURS PRN
Status: DISCONTINUED | OUTPATIENT
Start: 2021-11-02 | End: 2021-11-08

## 2021-11-02 RX ORDER — CEPHALEXIN 250 MG/1
250 CAPSULE ORAL DAILY
COMMUNITY
End: 2021-11-08

## 2021-11-02 RX ORDER — HYDROCODONE BITARTRATE AND ACETAMINOPHEN 10; 325 MG/1; MG/1
2 TABLET ORAL EVERY 6 HOURS PRN
Status: DISCONTINUED | OUTPATIENT
Start: 2021-11-02 | End: 2021-11-08

## 2021-11-02 RX ORDER — HEPARIN SODIUM 5000 [USP'U]/ML
5000 INJECTION, SOLUTION INTRAVENOUS; SUBCUTANEOUS EVERY 8 HOURS SCHEDULED
Status: DISCONTINUED | OUTPATIENT
Start: 2021-11-02 | End: 2021-11-08

## 2021-11-02 RX ORDER — BISACODYL 10 MG
10 SUPPOSITORY, RECTAL RECTAL
Status: DISCONTINUED | OUTPATIENT
Start: 2021-11-02 | End: 2021-11-08

## 2021-11-02 RX ORDER — MELATONIN
325 DAILY
Status: DISCONTINUED | OUTPATIENT
Start: 2021-11-03 | End: 2021-11-08

## 2021-11-02 RX ORDER — LEVOTHYROXINE SODIUM 0.12 MG/1
125 TABLET ORAL NIGHTLY
Status: DISCONTINUED | OUTPATIENT
Start: 2021-11-02 | End: 2021-11-08

## 2021-11-02 RX ORDER — LITHIUM CARBONATE 300 MG/1
300 TABLET, FILM COATED, EXTENDED RELEASE ORAL
Status: DISCONTINUED | OUTPATIENT
Start: 2021-11-03 | End: 2021-11-08

## 2021-11-02 RX ORDER — ACETAMINOPHEN 500 MG
1000 TABLET ORAL ONCE
Status: COMPLETED | OUTPATIENT
Start: 2021-11-02 | End: 2021-11-02

## 2021-11-02 RX ORDER — ALPRAZOLAM 0.5 MG/1
0.5 TABLET ORAL 2 TIMES DAILY PRN
Status: DISCONTINUED | OUTPATIENT
Start: 2021-11-02 | End: 2021-11-08

## 2021-11-02 RX ORDER — POLYETHYLENE GLYCOL 3350 17 G/17G
17 POWDER, FOR SOLUTION ORAL DAILY PRN
Status: DISCONTINUED | OUTPATIENT
Start: 2021-11-02 | End: 2021-11-08

## 2021-11-02 RX ORDER — ATORVASTATIN CALCIUM 10 MG/1
10 TABLET, FILM COATED ORAL NIGHTLY
Status: DISCONTINUED | OUTPATIENT
Start: 2021-11-02 | End: 2021-11-08

## 2021-11-02 RX ORDER — ASPIRIN 81 MG/1
81 TABLET ORAL DAILY
Status: DISCONTINUED | OUTPATIENT
Start: 2021-11-03 | End: 2021-11-08

## 2021-11-02 RX ORDER — MELATONIN
3 NIGHTLY PRN
Status: DISCONTINUED | OUTPATIENT
Start: 2021-11-02 | End: 2021-11-08

## 2021-11-02 RX ORDER — SODIUM CHLORIDE 9 MG/ML
INJECTION, SOLUTION INTRAVENOUS CONTINUOUS
Status: DISCONTINUED | OUTPATIENT
Start: 2021-11-02 | End: 2021-11-02

## 2021-11-02 NOTE — ED INITIAL ASSESSMENT (HPI)
Per patient, has been complaining of \"weird feeling\" when she urinates. Hx of UTI. EMS report patient has been beckford confused lately.

## 2021-11-02 NOTE — PROGRESS NOTES
[facility] Pharmacy Note:  Renal/Weight Adjustment for piperacillin/tazobactam (Luellen Comp)    Bello Moore is a 80year old patient who has been prescribed piperacillin/tazobactam (ZOSYN) 3.375 g every 8 hrs.   The estimated creatinine clearance is 15.7 mL/min (A)

## 2021-11-02 NOTE — PLAN OF CARE
Problem: Patient Centered Care  Goal: Patient preferences are identified and integrated in the patient's plan of care  Description: Interventions:  - What would you like us to know as we care for you?   - Provide timely, complete, and accurate informatio increased pain with activity and pre-medicate as appropriate  Outcome: Progressing     Problem: RISK FOR INFECTION - ADULT  Goal: Absence of fever/infection during anticipated neutropenic period  Description: INTERVENTIONS  - Monitor WBC  - Administer grow INTERVENTIONS:  - Assess patient’s ability to void and empty bladder  - Monitor intake/output and perform bladder scan as needed  - Follow urinary retention protocol/standard of care  - Consider collaborating with pharmacy to review patient's medication pr

## 2021-11-02 NOTE — ED PROVIDER NOTES
Patient Seen in: [facility] AND [facility] Emergency Department      History   Patient presents with:  Urinary Symptoms    Stated Complaint: urinary complaint    Subjective:   HPI    70-year-old female with history of hypertension, hyperlipidemia, coronary arter bipolar   • OTHER DISEASES     renal insufficiency   • Pneumonia due to organism    • Renal disorder    • TIA (transient ischemic attack) 1/2013   • UTI (lower urinary tract infection)    • Visual impairment     reading glasses              Past Surgica normal  Neurological: Speech normal.  Cranial nerves II through XII intact. No focal motor or sensory deficits to extremities x4. Skin: warm and dry, no rashes.   Musculoskeletal: neck is supple non tender        Extremities are symmetrical, full range of RAINBOW DRAW LAVENDER   RAINBOW DRAW LIGHT GREEN   RAINBOW DRAW GOLD   URINE CULTURE, ROUTINE   BLOOD CULTURE   BLOOD CULTURE                 MDM      Lab and x-ray results noted. Patient with sepsis secondary to urinary tract infection.   Will admit for

## 2021-11-02 NOTE — ED QUICK NOTES
Orders for admission, patient is aware of plan and ready to go upstairs. Any questions, please call ED RN Vee Chan at [phone].    Type of COVID test sent:Rapid -  COVID Suspicion level: Low    Titratable drug(s) infusing:  Rate:    LOC at time of transp

## 2021-11-02 NOTE — CM/SW NOTE
11/02/21 1500   CM/SW Referral Data   Referral Source Physician   Reason for Referral Discharge planning   Informant Sibling   Pertinent Medical Hx   Does patient have an established PCP?  Yes  Odin Bravo   Patient Info   Patient's Home Environment

## 2021-11-02 NOTE — H&P
[facility] Hospitalist H&P       CC: Patient presents with:  Urinary Symptoms       PCP: Dee Souza MD    History of Present Illness:   Jbwilliam Kocher a 80year old female with history of CKD stage 4, CAD, HTN, hypothyroidism, depressi ALL:    Allegra-D               ANXIETY  Benadryl [Banophen]     ANXIETY  Thorazine [Chlorpro*    HIVES     Home Medications:  No outpatient medications have been marked as taking for the 11/2/21 [facility]).         Soc Hx  Social Hi the last 168 hours. Invalid input(s): ALPHOS, TBIL, DBIL, TPROT    No results for input(s): TROP in the last 168 hours. Radiology: XR CHEST AP PORTABLE  (CPT=71045)    Result Date: 11/2/2021  CONCLUSION:  1. Cardiomegaly.  2. Prominent left perihil

## 2021-11-02 NOTE — PROGRESS NOTES
[facility]      Sepsis Reassessment Note    /70 (BP Location: Right arm)   Pulse 73   Temp 98 °F (36.7 °C) (Oral)   Resp 20   Ht 5' 7\" (1.702 m)   Wt 225 lb (102.1 kg)   LMP  (LMP Unknown)   SpO2 99%   BMI 35.24 kg/m²      I comple

## 2021-11-03 PROCEDURE — 97530 THERAPEUTIC ACTIVITIES: CPT

## 2021-11-03 PROCEDURE — 83735 ASSAY OF MAGNESIUM: CPT | Performed by: HOSPITALIST

## 2021-11-03 PROCEDURE — 80053 COMPREHEN METABOLIC PANEL: CPT | Performed by: HOSPITALIST

## 2021-11-03 PROCEDURE — 97162 PT EVAL MOD COMPLEX 30 MIN: CPT

## 2021-11-03 PROCEDURE — 97166 OT EVAL MOD COMPLEX 45 MIN: CPT

## 2021-11-03 PROCEDURE — 85025 COMPLETE CBC W/AUTO DIFF WBC: CPT | Performed by: HOSPITALIST

## 2021-11-03 NOTE — PROGRESS NOTES
[facility] Hospitalist Progress Note     CC: Hospital Follow up    PCP: Didi Rain MD       Assessment/Plan:     Principal Problem:    Sepsis due to urinary tract infection ([facility] [address]    Susanne Bailey a 80year old female with history of CKD lb 3.2 oz (102.2 kg)  04/04/21 0636 : 223 lb 9.6 oz (101.4 kg)  04/01/21 2021 : 217 lb 14.4 oz (98.8 kg)  04/01/21 1609 : 230 lb (104.3 kg)      Exam   Gen: No acute distress, alert and oriented x3  Heent: NC AT, neck supple  Pulm: Lungs clear, normal resp

## 2021-11-03 NOTE — CDS QUERY
How to Answer this Query    1.) Click \"Edit Button\" on the toolbar  2.) Type an \"X\" in the bracket for the diagnosis that applies. (You may also add additional clinical details as you feel necessary to substantiate your response).   3.) Finally click \" 3.1 then 1.0  creatinine 2.73 then 2.66    11-2 Temp 101  /57  Pulse 85  Respiratory rate 18    Meds include Zosyn IVPB    Thanks    If you have any questions, please contact Clinical :  Sol Dakins, RN MSN CCDS at [phone]

## 2021-11-03 NOTE — PLAN OF CARE
[employer] is alert and pleasant and reports feeling better today than yesterday. Excellent appetite. Voiding well via purewick. Up with PT, uses walker with 2 assist. [address] as needed for chronic right leg pain.  Reposition every 2 hrs and as needed for comfor and caregiver  - Include patient/family/discharge partner in discharge planning  - Arrange for needed discharge resources and transportation as appropriate  - Identify discharge learning needs (meds, wound care, etc)  - Arrange for interpreters to assist a

## 2021-11-03 NOTE — PHYSICAL THERAPY NOTE
PHYSICAL THERAPY EVALUATION - INPATIENT     Room Number: FFNT25/OPEE55-A  Evaluation Date: 11/3/2021  Type of Evaluation: Initial   Physician Order: See Comment for Specific Order (eval)    Presenting Problem: urosepsis, UTI, weakness  Reason for Therapy: stay. Pt was MOD IND with ADLs prior to admission, currently up to MOD A d/t deficits. Pt is HIGH FALL RISK and below her functional baseline. PT recommendation Sub Acute Rehab at d/c to address deficits and maximize patient independence.  Per chart, pt wit BENIGN BIOPSY LEFT      over 10 years ago   • CHOLECYSTECTOMY  2001   • COLONOSCOPY  2006    repeat 2011   • COLONOSCOPY     • COLONOSCOPY N/A 4/1/2019    Procedure: COLONOSCOPY;  Surgeon: Alison Alvarez MD;  Location: [address] [place] [address] ENDOSCOPY   • FOOT SURGERY currently have. ..   Patient Difficulty: Turning over in bed (including adjusting bedclothes, sheets and blankets)?: A Little   Patient Difficulty: Sitting down on and standing up from a chair with arms (e.g., wheelchair, bedside commode, etc.): A Lot   [place] level: modified independent   Goal #3   Current Status    Goal #4 Patient to demonstrate independence with home activity/exercise instructions provided to patient in preparation for discharge.    Goal #4   Current Status    Goal #5    Goal #5   Current Stat

## 2021-11-03 NOTE — CM/SW NOTE
PT and OT are recommending subacute rehab for the pt. [facility] [address] [place] [place] [address]. The pt's brother would be agreeable to rehab, but not sure the pt. Will be.   Will need to follow up wi

## 2021-11-03 NOTE — CONSULTS
[address] [facility] Infectious Disease  Report of Consultation    Jessica Aleman Patient Status:  Inpatient    1940 MRN [id number]   Location [facility] 1W Attending Sommer Saucedo MD   Hosp Day # 1 PCP Acey Sandifer, MD BIOPSY LEFT      over 10 years ago   • CHOLECYSTECTOMY  2001   • COLONOSCOPY  2006    repeat 2011   • COLONOSCOPY     • COLONOSCOPY N/A 4/1/2019    Procedure: COLONOSCOPY;  Surgeon: Alison Alvarez MD;  Location: [address] [place] [address] ENDOSCOPY   • FOOT SURGERY Right Systems:    Constitutional:  Fevers, weakness, falls. HEENT:  No visual changes, oral ulcers, sore throat, difficulty swallowing. Respiratory: Negative for cough, sputum, hemoptysis, chest pain, wheezing, dyspnea on exertion, or stridor.    Bk Martin Soft, NT/ND. Bowel sounds present. No organomegaly. Extremity: No edema. Skin: No rashes or lesions. Neurological: No focal neurologic deficits.     Lab Data Review:  Lab Results   Component Value Date    WBC 6.8 11/03/2021    HGB 9.6 11/03/2021

## 2021-11-03 NOTE — OCCUPATIONAL THERAPY NOTE
OCCUPATIONAL THERAPY EVALUATION - INPATIENT     Room Number: XFRW44/ZIDU73-Z  Evaluation Date: 11/3/2021  Type of Evaluation: Initial  Presenting Problem: sepsis due to UTI    Physician Order: IP Consult to Occupational Therapy  Reason for Therapy: ADL/IAD Anxiety state    • B12 deficiency 2013   • Back problem    • Bipolar affective (HCC)    • Cataract     both eyes   • CKD (chronic kidney disease), stage 4 (severe)     sees nephrology   • Complex regional pain syndrome of right lower extremity    • Coronar ASSESSMENT  Rating: Unable to rate  Location: RLE        Behavioral/Emotional/Social: appears anxious; some self-limiting behavior; needs encouragement    RANGE OF MOTION   Upper extremity RUE AROM WFL; limited L shoulder AROM 2* OA; all other joints [facility] 3-5x/wk    Leonardo Lora   Occupational Therapist  [address] [place] [address]

## 2021-11-04 ENCOUNTER — APPOINTMENT (OUTPATIENT)
Dept: ULTRASOUND IMAGING | Facility: HOSPITAL | Age: 81
DRG: 871 | End: 2021-11-04
Attending: HOSPITALIST
Payer: MEDICARE

## 2021-11-04 PROCEDURE — 80048 BASIC METABOLIC PNL TOTAL CA: CPT | Performed by: HOSPITALIST

## 2021-11-04 PROCEDURE — 76770 US EXAM ABDO BACK WALL COMP: CPT | Performed by: HOSPITALIST

## 2021-11-04 PROCEDURE — 85025 COMPLETE CBC W/AUTO DIFF WBC: CPT | Performed by: HOSPITALIST

## 2021-11-04 PROCEDURE — 97530 THERAPEUTIC ACTIVITIES: CPT

## 2021-11-04 PROCEDURE — 83735 ASSAY OF MAGNESIUM: CPT | Performed by: HOSPITALIST

## 2021-11-04 PROCEDURE — 82962 GLUCOSE BLOOD TEST: CPT

## 2021-11-04 RX ORDER — SODIUM CHLORIDE 9 MG/ML
INJECTION, SOLUTION INTRAVENOUS CONTINUOUS
Status: DISCONTINUED | OUTPATIENT
Start: 2021-11-04 | End: 2021-11-06

## 2021-11-04 RX ORDER — LORAZEPAM 2 MG/ML
0.5 INJECTION INTRAMUSCULAR ONCE
Status: DISCONTINUED | OUTPATIENT
Start: 2021-11-04 | End: 2021-11-04

## 2021-11-04 NOTE — PLAN OF CARE
Problem: Patient Centered Care  Goal: Patient preferences are identified and integrated in the patient's plan of care  Description: Interventions:  - What would you like us to know as we care for you?   - Provide timely, complete, and accurate informatio period  Description: INTERVENTIONS  - Monitor WBC  - Administer growth factors as ordered  - Implement neutropenic guidelines  Outcome: Progressing     Problem: SAFETY ADULT - FALL  Goal: Free from fall injury  Description: INTERVENTIONS:  - Assess pt freq Consider collaborating with pharmacy to review patient's medication profile  - Implement strategies to promote bladder emptying  Outcome: Progressing     Problem: SKIN/TISSUE INTEGRITY - ADULT  Goal: Skin integrity remains intact  Description: INTERVENTION

## 2021-11-04 NOTE — PHYSICAL THERAPY NOTE
PHYSICAL THERAPY TREATMENT NOTE - INPATIENT     Room Number: 557/557-A       Presenting Problem: urosepsis, UTI, weakness    Problem List  Principal Problem:    Sepsis due to urinary tract infection ([facility] [address]      PHYSICAL THERAPY ASSESSMENT     PT wore 2 drop rate)  Location: chronic pain/CRPS of RLE  Management Techniques: Relaxation    BALANCE                                                                                                                     Static Sitting: Fair -  Dynamic Sitting: Poor + Status Mod A x 2   Goal #3 Patient is able to ambulate 50 feet with assist device: walker - rolling at assistance level: modified independent   Goal #3   Current Status Side steps w/ RW & Min A x 2   Goal #4 Patient to demonstrate independence with home ac

## 2021-11-04 NOTE — PROGRESS NOTES
[facility] Hospitalist Progress Note     CC: Hospital Follow up    PCP: Karely Man MD       Assessment/Plan:     Principal Problem:    Sepsis due to urinary tract infection ([facility] [address]    Erich Maldonado a 80year old female with history of CKD (104.3 kg)  08/07/21 2207 : 225 lb (102.1 kg)  04/05/21 0626 : 225 lb 3.2 oz (102.2 kg)  04/04/21 0636 : 223 lb 9.6 oz (101.4 kg)  04/01/21 2021 : 217 lb 14.4 oz (98.8 kg)  04/01/21 1609 : 230 lb (104.3 kg)      Exam   Gen: No acute distress, alert and dom mcg Oral Nightly   • Lithium Carbonate ER  300 mg Oral Daily     • sodium chloride 50 mL/hr at 11/04/21 1127     acetaminophen, melatonin, polyethylene glycol (PEG 3350), bisacodyl, ondansetron, ALPRAZolam, HYDROcodone-acetaminophen

## 2021-11-04 NOTE — CM/SW NOTE
PT rec is [facility]. Per dc rounds pt is declining [facility] however, her brother is at bedside and he would like pt to go to [facility]. Cm  Met with pt and brother at bedside to discuss dc planning. Pt was starting PT.  CM spoke with brother alone to provide update o

## 2021-11-04 NOTE — PROGRESS NOTES
Tess Jha is a 80year old female. Patient presents with:  Urinary Symptoms      HPI:    Seen this am with dr Yulissa Davidson:   A comprehensive 11 point review of systems was completed.   Pertinent positives and negatives noted in the th Pulse 76   Temp 97.9 °F (36.6 °C) (Oral)   Resp 18   Ht 5' 7\" (1.702 m)   Wt 225 lb (102.1 kg)   LMP  (LMP Unknown)   SpO2 96%   BMI 35.24 kg/m²   GENERAL:  Awake, alert, oriented x3. Non-tox, non-septic and in NAD.   HEENT:  Normocephalic, Nonicteric, Co SARS-CoV-2 by PCR 11/02/2021 Not Detected  Not Detected Final    This test is intended for the qualitative detection of nucleic acid from the SARS-CoV-2 viral RNA from individuals who are suspected of COVID-19 infection by their healthcare provider.     A \ 3.80 - 5.30 x10(6)uL Final   • HGB 11/02/2021 11.1* 12.0 - 16.0 g/dL Final   • HCT 11/02/2021 37.1  35.0 - 48.0 % Final   • MCV 11/02/2021 106.0* 80.0 - 100.0 fL Final   • MCH 11/02/2021 31.7  26.0 - 34.0 pg Final   • MCHC 11/02/2021 29.9* 31.0 - 37.0 g/dL 300* 275 - 295 mOsm/kg Final   • GFR, Non- 11/03/2021 16* >=60 Final   • GFR, -American 11/03/2021 19* >=60 Final   • ALT 11/03/2021 10* 13 - 56 U/L Final   • AST 11/03/2021 11* 15 - 37 U/L Final   • Alkaline Phosphatase 11/03/2021 1 5.0  3.5 - 5.1 mmol/L Final   • [place] 11/04/2021 121* 98 - 112 mmol/L Final   • CO2 11/04/2021 22.0  21.0 - 32.0 mmol/L Final   • Anion Gap 11/04/2021 2  0 - 18 mmol/L Final   • BUN 11/04/2021 27* 7 - 18 mg/dL Final   • Creatinine 11/04/2021 2.95* 0.55 during the hospital encounter of 11/02/21   Urinalysis with Culture Reflex    Specimen: Urine   Result Value Ref Range    Urine Color Yellow Yellow    Clarity Urine Cloudy (A) Clear    Spec Gravity 1.008 1.001 - 1.030    Glucose Urine Negative Negative mg/ -American 19 (L) >=60    ALT 10 (L) 13 - 56 U/L    AST 11 (L) 15 - 37 U/L    Alkaline Phosphatase 107 55 - 142 U/L    Bilirubin, Total 0.4 0.1 - 2.0 mg/dL    Total Protein 5.8 (L) 6.4 - 8.2 g/dL    Albumin 2.4 (L) 3.4 - 5.0 g/dL    Globulin  3.4 2.8 Result Value Ref Range    Blood Culture Result No Growth 2 Days    Blood Culture    Specimen: Blood,peripheral   Result Value Ref Range    Blood Culture Result No Growth 2 Days    CBC W/ DIFFERENTIAL   Result Value Ref Range    WBC 6.5 4.0 - 11.0 x10(3) Value Ref Range    WBC 6.8 4.0 - 11.0 x10(3) uL    RBC 3.22 (L) 3.80 - 5.30 x10(6)uL    HGB 9.9 (L) 12.0 - 16.0 g/dL    HCT 35.1 35.0 - 48.0 %    .0 (H) 80.0 - 100.0 fL    MCH 30.7 26.0 - 34.0 pg    MCHC 28.2 (L) 31.0 - 37.0 g/dL    RDW-SD 54.3 (H)

## 2021-11-05 PROCEDURE — 80178 ASSAY OF LITHIUM: CPT | Performed by: HOSPITALIST

## 2021-11-05 PROCEDURE — 80048 BASIC METABOLIC PNL TOTAL CA: CPT | Performed by: HOSPITALIST

## 2021-11-05 PROCEDURE — 83735 ASSAY OF MAGNESIUM: CPT | Performed by: HOSPITALIST

## 2021-11-05 PROCEDURE — 02HV33Z INSERTION OF INFUSION DEVICE INTO SUPERIOR VENA CAVA, PERCUTANEOUS APPROACH: ICD-10-PCS | Performed by: INTERNAL MEDICINE

## 2021-11-05 PROCEDURE — 85025 COMPLETE CBC W/AUTO DIFF WBC: CPT | Performed by: HOSPITALIST

## 2021-11-05 NOTE — PLAN OF CARE
Problem: Patient Centered Care  Goal: Patient preferences are identified and integrated in the patient's plan of care  Description: Interventions:  - What would you like us to know as we care for you?   - Provide timely, complete, and accurate informatio anticipated neutropenic period  Description: INTERVENTIONS  - Monitor WBC  - Administer growth factors as ordered  - Implement neutropenic guidelines  Outcome: Progressing     Problem: SAFETY ADULT - FALL  Goal: Free from fall injury  Description: [place] protocol/standard of care  - Consider collaborating with pharmacy to review patient's medication profile  - Implement strategies to promote bladder emptying  Outcome: Progressing     Problem: SKIN/TISSUE INTEGRITY - ADULT  Goal: Skin integrity remains [employer]

## 2021-11-05 NOTE — PROGRESS NOTES
Jessica Aleman is a 80year old female admitted to [address] [place] [address] on 11/02 with Cystitis, has hist of recurrent UTI,      HPI:    Patient seen and examined,   Afebrile,   Previous entries noted,   No new complaints,   Worried about constipation,     Allergies:    A • Incontinence    • Muscle weakness    • Osteoarthritis    • OSTEOPENIA    • OTHER DISEASES     bipolar   • OTHER DISEASES     renal insufficiency   • Pneumonia due to organism    • Renal disorder    • TIA (transient ischemic attack) 1/2013   • UTI (lowe Metabolic Panel (8)   Result Value Ref Range    Glucose 126 (H) 70 - 99 mg/dL    Sodium 144 136 - 145 mmol/L    Potassium      [place] 118 (H) 98 - 112 mmol/L    CO2 18.0 (L) 21.0 - 32.0 mmol/L    Anion Gap 8 0 - 18 mmol/L    BUN 21 (H) 7 - 18 mg/dL    Cr 10.0 - 20.0    Calcium, Total 9.0 8.5 - 10.1 mg/dL    Calculated Osmolality 305 (H) 275 - 295 mOsm/kg    GFR, Non- 14 (L) >=60    GFR, -American 17 (L) >=60   Magnesium   Result Value Ref Range    Magnesium 2.4 1.6 - 2.6 mg/dL   Basi Result No Growth 2 Days    Blood Culture    Specimen: Blood,peripheral   Result Value Ref Range    Blood Culture Result No Growth 2 Days    CBC W/ DIFFERENTIAL   Result Value Ref Range    WBC 6.5 4.0 - 11.0 x10(3) uL    RBC 3.50 (L) 3.80 - 5.30 x10(6)uL x10(3) uL    RBC 3.22 (L) 3.80 - 5.30 x10(6)uL    HGB 9.9 (L) 12.0 - 16.0 g/dL    HCT 35.1 35.0 - 48.0 %    .0 (H) 80.0 - 100.0 fL    MCH 30.7 26.0 - 34.0 pg    MCHC 28.2 (L) 31.0 - 37.0 g/dL    RDW-SD 54.3 (H) 35.1 - 46.3 fL    RDW 13.3 11.0 - 15. 0 nausea, vomiting, diarrhea, or constipation. :  Denies dysuria or hematuria. NEURO:  Denies headaches, photophobia, weakness or neurologic deficits. DERM:  Denies rashes. MUSCULOSKELETAL:  Denies joint pain, muscle pain.   PSYCHIATRIC:  Denies feeling

## 2021-11-05 NOTE — PLAN OF CARE
Patient confused and disoriented this morning. Patient started on IV fluids. Patient had ultrasound of kidney today. Patient comfortable. No pain.    Problem: Patient Centered Care  Goal: Patient preferences are identified and integrated in the patient's pl during anticipated neutropenic period  Description: INTERVENTIONS  - Monitor WBC  - Administer growth factors as ordered  - Implement neutropenic guidelines  Outcome: Progressing     Problem: SAFETY ADULT - FALL  Goal: Free from fall injury  Description: I protocol/standard of care  - Consider collaborating with pharmacy to review patient's medication profile  - Implement strategies to promote bladder emptying  Outcome: Progressing     Problem: SKIN/TISSUE INTEGRITY - ADULT  Goal: Skin integrity remains [place]

## 2021-11-05 NOTE — VASCULAR ACCESS
Midline catheter insertion request:  Clarified with Mohini MCDANIEL- no definite discharge plans for IV abx.this weekend. ID notes indicates possible dc on po abx. Will re-assess for midline insertion on Monday.

## 2021-11-05 NOTE — PROGRESS NOTES
[facility] Hospitalist Progress Note     CC: Hospital Follow up    PCP: Pierce Rueda MD       Assessment/Plan:     Principal Problem:    Sepsis due to urinary tract infection ([facility] [address]    Norberto Marker a 80year old female with history of CKD per 24 hour   Intake 787.5 ml   Output 1525 ml   Net -737.5 ml       Last 3 Weights  11/02/21 1459 : 225 lb (102.1 kg)  11/02/21 1113 : 230 lb (104.3 kg)  08/07/21 2207 : 225 lb (102.1 kg)  04/05/21 0626 : 225 lb 3.2 oz (102.2 kg)  04/04/21 0636 : 223 lb 9

## 2021-11-05 NOTE — PLAN OF CARE
No acute changes during shift. Patient scheduled for Midline insertion tomorrow 11/6 for longterm IV abx. Patient updated with plan of care. Enema done today and patient has had increased stool occurrences.      Problem: Patient Centered Care  Goal: Patient interventions unsuccessful or patient reports new pain  - Anticipate increased pain with activity and pre-medicate as appropriate  Outcome: Progressing     Problem: RISK FOR INFECTION - ADULT  Goal: Absence of fever/infection during anticipated neutropenic GENITOURINARY - ADULT  Goal: Absence of urinary retention  Description: INTERVENTIONS:  - Assess patient’s ability to void and empty bladder  - Monitor intake/output and perform bladder scan as needed  - Follow urinary retention protocol/standard of care

## 2021-11-06 PROCEDURE — 83735 ASSAY OF MAGNESIUM: CPT | Performed by: HOSPITALIST

## 2021-11-06 PROCEDURE — 80048 BASIC METABOLIC PNL TOTAL CA: CPT | Performed by: HOSPITALIST

## 2021-11-06 PROCEDURE — 85025 COMPLETE CBC W/AUTO DIFF WBC: CPT | Performed by: HOSPITALIST

## 2021-11-06 RX ORDER — DEXTROSE MONOHYDRATE 50 MG/ML
INJECTION, SOLUTION INTRAVENOUS CONTINUOUS
Status: DISCONTINUED | OUTPATIENT
Start: 2021-11-06 | End: 2021-11-07

## 2021-11-06 NOTE — PROGRESS NOTES
[facility] Pharmacy Note:  Renal Adjustment for cefepime (MAXIPIME)    Dann Beauchamp is a 80year old patient who has been prescribed cefepime (MAXIPIME) 1 g every 12 hrs. The estimated creatinine clearance is 16 mL/min (A) (based on SCr of 2.69 mg/dL (H)).  Ivy Mcnair

## 2021-11-06 NOTE — PROGRESS NOTES
Maricruz Adorno is a 80year old female admitted to [address] [place] [address] on 11/02 with Cystitis, has hist of recurrent UTI,      HPI:    Patient seen and examined,   Afebrile,   Previous entries noted,   No new complaints,   Worried about constipation,   Son at the bedsid • HYPOTHYROIDISM    • Incontinence    • Muscle weakness    • Osteoarthritis    • OSTEOPENIA    • OTHER DISEASES     bipolar   • OTHER DISEASES     renal insufficiency   • Pneumonia due to organism    • Renal disorder    • TIA (transient ischemic attack) /HPF   Basic Metabolic Panel (8)   Result Value Ref Range    Glucose 126 (H) 70 - 99 mg/dL    Sodium 144 136 - 145 mmol/L    Potassium      [place] 118 (H) 98 - 112 mmol/L    CO2 18.0 (L) 21.0 - 32.0 mmol/L    Anion Gap 8 0 - 18 mmol/L    BUN 21 (H) 7 - 1 Ratio 9.2 (L) 10.0 - 20.0    Calcium, Total 9.0 8.5 - 10.1 mg/dL    Calculated Osmolality 305 (H) 275 - 295 mOsm/kg    GFR, Non- 14 (L) >=60    GFR, -American 17 (L) >=60   Magnesium   Result Value Ref Range    Magnesium 2.4 1.6 - 2. Detected Not Detected   Urine Culture, Routine    Specimen: Urine, clean catch   Result Value Ref Range    Urine Culture >100,000 CFU/ML Pseudomonas aeruginosa (A)        Susceptibility    Pseudomonas aeruginosa -  (no method available)     Amikacin 32 Int .0 150.0 - 450.0 10(3)uL    Neutrophil Absolute Prelim 2.88 1.50 - 7.70 x10 (3) uL    Neutrophil Absolute 2.88 1.50 - 7.70 x10(3) uL    Lymphocyte Absolute 2.93 1.00 - 4.00 x10(3) uL    Monocyte Absolute 0.38 0.10 - 1.00 x10(3) uL    Eosinophil Abso 0. 51 0.10 - 1.00 x10(3) uL    Eosinophil Absolute 0.67 0.00 - 0.70 x10(3) uL    Basophil Absolute 0.03 0.00 - 0.20 x10(3) uL    Immature Granulocyte Absolute 0.01 0.00 - 1.00 x10(3) uL    Neutrophil % 52.9 %    Lymphocyte % 31.8 %    Monocyte % 6.4 %    Eo 100%   BMI 35.24 kg/m²   GENERAL:  Awake, alert, oriented x3. Non-tox, non-septic and in NAD. HEENT:  Normocephalic, no scleral abnormalities. Oropharynx clear, trachea ML. NECK:  Supple, no masses, no lymphadenopathy.   LUNGS:  Clear to auscultation b/l

## 2021-11-06 NOTE — PROGRESS NOTES
[facility] Hospitalist Progress Note     CC: Hospital Follow up    PCP: Aaron Angel MD       Assessment/Plan:     Principal Problem:    Sepsis due to urinary tract infection ([facility] [address]    Marilia Gene a 80year old female with history of CKD data filed at 11/6/2021 1052  Gross per 24 hour   Intake 250 ml   Output 1200 ml   Net -950 ml       Last 3 Weights  11/02/21 1459 : 225 lb (102.1 kg)  11/02/21 1113 : 230 lb (104.3 kg)  08/07/21 2207 : 225 lb (102.1 kg)  04/05/21 0626 : 225 lb 3.2 oz (102 HYDROcodone-acetaminophen

## 2021-11-06 NOTE — PLAN OF CARE
Problem: Patient Centered Care  Goal: Patient preferences are identified and integrated in the patient's plan of care  Description: Interventions:  - What would you like us to know as we care for you?  Im from [facility]  - Provide timely, complete, and ac growth factors as ordered  - Implement neutropenic guidelines  Outcome: Progressing     Problem: SAFETY ADULT - FALL  Goal: Free from fall injury  Description: INTERVENTIONS:  - Assess pt frequently for physical needs  - Identify cognitive and physical def profile  - Implement strategies to promote bladder emptying  Outcome: Progressing     Problem: SKIN/TISSUE INTEGRITY - ADULT  Goal: Skin integrity remains intact  Description: INTERVENTIONS  - Assess and document risk factors for pressure ulcer development

## 2021-11-07 PROCEDURE — 83735 ASSAY OF MAGNESIUM: CPT | Performed by: HOSPITALIST

## 2021-11-07 PROCEDURE — 85025 COMPLETE CBC W/AUTO DIFF WBC: CPT | Performed by: HOSPITALIST

## 2021-11-07 PROCEDURE — 80048 BASIC METABOLIC PNL TOTAL CA: CPT | Performed by: HOSPITALIST

## 2021-11-07 RX ORDER — DEXTROSE MONOHYDRATE 50 MG/ML
INJECTION, SOLUTION INTRAVENOUS CONTINUOUS
Status: ACTIVE | OUTPATIENT
Start: 2021-11-07 | End: 2021-11-07

## 2021-11-07 NOTE — PROGRESS NOTES
Domo Flores is a 80year old female admitted to [address] [place] [address] on 11/02 with Cystitis, has hist of recurrent UTI,      HPI:    Patient seen and examined,   Afebrile,   Previous entries noted,   No new complaints,   Worried about constipation,   Son at the bedsid • HYPOTHYROIDISM    • Incontinence    • Muscle weakness    • Osteoarthritis    • OSTEOPENIA    • OTHER DISEASES     bipolar   • OTHER DISEASES     renal insufficiency   • Pneumonia due to organism    • Renal disorder    • TIA (transient ischemic attack) /HPF   Basic Metabolic Panel (8)   Result Value Ref Range    Glucose 126 (H) 70 - 99 mg/dL    Sodium 144 136 - 145 mmol/L    Potassium      Chloride 118 (H) 98 - 112 mmol/L    CO2 18.0 (L) 21.0 - 32.0 mmol/L    Anion Gap 8 0 - 18 mmol/L    BUN 21 (H) 7 - 1 Ratio 9.2 (L) 10.0 - 20.0    Calcium, Total 9.0 8.5 - 10.1 mg/dL    Calculated Osmolality 305 (H) 275 - 295 mOsm/kg    GFR, Non- 14 (L) >=60    GFR, -American 17 (L) >=60   Magnesium   Result Value Ref Range    Magnesium 2.4 1.6 - 2. Non- 16 (L) >=60    GFR, -American 18 (L) >=60   Magnesium   Result Value Ref Range    Magnesium 2.1 1.6 - 2.6 mg/dL   REDRAW BMP   Result Value Ref Range    Potassium 4.1 3.5 - 5.1 mmol/L   POCT Glucose   Result Value Ref Range    P Eosinophil Absolute 0.49 0.00 - 0.70 x10(3) uL    Basophil Absolute 0.03 0.00 - 0.20 x10(3) uL    Immature Granulocyte Absolute 0.01 0.00 - 1.00 x10(3) uL    Neutrophil % 64.0 %    Lymphocyte % 22.6 %    Monocyte % 5.1 %    Eosinophil % 7.6 %    Hilary Monocyte % 6.2 %    Eosinophil % 9.2 %    Basophil % 0.3 %    Immature Granulocyte % 0.1 %   CBC W/ DIFFERENTIAL   Result Value Ref Range    WBC 8.0 4.0 - 11.0 x10(3) uL    RBC 3.27 (L) 3.80 - 5.30 x10(6)uL    HGB 10.1 (L) 12.0 - 16.0 g/dL    HCT 35.2 35. 0 HGB 10.0 (L) 12.0 - 16.0 g/dL    HCT 35.1 35.0 - 48.0 %    .3 (H) 80.0 - 100.0 fL    MCH 30.9 26.0 - 34.0 pg    MCHC 28.5 (L) 31.0 - 37.0 g/dL    RDW-SD 55.0 (H) 35.1 - 46.3 fL    RDW 13.5 11.0 - 15.0 %    .0 150.0 - 450.0 10(3)uL    Neutroph cyanosis. NEURO:  No focal neurologic deficits. DERM:  Warm, dry, no rashes. IMPRESSION:   1.   Evolving sepsis with fevers,weakness & falls:   - Secondary to  source of infection,   - admitted with dysuria, improving now,   - UA abn, Cul with PSAR

## 2021-11-07 NOTE — PROGRESS NOTES
[facility] Hospitalist Progress Note     CC: Hospital Follow up    PCP: Luigi Jenkins MD       Assessment/Plan:     Principal Problem:    Sepsis due to urinary tract infection ([facility] [address]    Ghulam Comment a 80year old female with history of CKD 5' 7\" (1.702 m), weight 225 lb (102.1 kg), SpO2 100 %, not currently breastfeeding.     Temp:  [97.6 °F (36.4 °C)-98.2 °F (36.8 °C)] 98.1 °F (36.7 °C)  Pulse:  [59-71] 71  Resp:  [16-18] 18  BP: (135-142)/(58-72) 140/58      Intake/Output:    Intake/Output mg Oral Nightly   • aspirin  81 mg Oral Daily   • atorvastatin  10 mg Oral Nightly   • ferrous sulfate  325 mg Oral Daily   • levothyroxine  125 mcg Oral Nightly   • Lithium Carbonate ER  300 mg Oral Daily     • dextrose 50 mL/hr at 11/07/21 1030     bc

## 2021-11-07 NOTE — PLAN OF CARE
Problem: Patient Centered Care  Goal: Patient preferences are identified and integrated in the patient's plan of care  Description: Interventions:  - What would you like us to know as we care for you?  Im from [facility]  - Provide timely, complete, and ac growth factors as ordered  - Implement neutropenic guidelines  Outcome: Progressing     Problem: SAFETY ADULT - FALL  Goal: Free from fall injury  Description: INTERVENTIONS:  - Assess pt frequently for physical needs  - Identify cognitive and physical def profile  - Implement strategies to promote bladder emptying  Outcome: Progressing     Problem: SKIN/TISSUE INTEGRITY - ADULT  Goal: Skin integrity remains intact  Description: INTERVENTIONS  - Assess and document risk factors for pressure ulcer development

## 2021-11-07 NOTE — PLAN OF CARE
No acute changes throughout the night. PRN norco for chronic right lower extremity pain. Safety precautions in place.     Problem: Patient Centered Care  Goal: Patient preferences are identified and integrated in the patient's plan of care  Description: Int interventions unsuccessful or patient reports new pain  - Anticipate increased pain with activity and pre-medicate as appropriate  Outcome: Progressing     Problem: RISK FOR INFECTION - ADULT  Goal: Absence of fever/infection during anticipated neutropenic GENITOURINARY - ADULT  Goal: Absence of urinary retention  Description: INTERVENTIONS:  - Assess patient’s ability to void and empty bladder  - Monitor intake/output and perform bladder scan as needed  - Follow urinary retention protocol/standard of care

## 2021-11-08 ENCOUNTER — APPOINTMENT (OUTPATIENT)
Dept: PICC SERVICES | Facility: HOSPITAL | Age: 81
DRG: 871 | End: 2021-11-08
Attending: INTERNAL MEDICINE
Payer: MEDICARE

## 2021-11-08 VITALS
WEIGHT: 225 LBS | DIASTOLIC BLOOD PRESSURE: 86 MMHG | HEIGHT: 67 IN | SYSTOLIC BLOOD PRESSURE: 152 MMHG | BODY MASS INDEX: 35.31 KG/M2 | HEART RATE: 78 BPM | TEMPERATURE: 98 F | RESPIRATION RATE: 16 BRPM | OXYGEN SATURATION: 94 %

## 2021-11-08 PROCEDURE — 83735 ASSAY OF MAGNESIUM: CPT | Performed by: HOSPITALIST

## 2021-11-08 PROCEDURE — 80048 BASIC METABOLIC PNL TOTAL CA: CPT | Performed by: HOSPITALIST

## 2021-11-08 PROCEDURE — 76937 US GUIDE VASCULAR ACCESS: CPT

## 2021-11-08 PROCEDURE — 36410 VNPNXR 3YR/> PHY/QHP DX/THER: CPT

## 2021-11-08 PROCEDURE — 85025 COMPLETE CBC W/AUTO DIFF WBC: CPT | Performed by: HOSPITALIST

## 2021-11-08 RX ORDER — LIDOCAINE HYDROCHLORIDE 10 MG/ML
5 INJECTION, SOLUTION EPIDURAL; INFILTRATION; INTRACAUDAL; PERINEURAL ONCE
Status: DISCONTINUED | OUTPATIENT
Start: 2021-11-08 | End: 2021-11-08

## 2021-11-08 NOTE — PHYSICAL THERAPY NOTE
Attempt x 1    Chart reviewed ,  RN approve participation. Pt received on room air supine in bed . Resting HR  70 O2 sats 97 % . Pt is alert , oriented to place, person and time. Therapy POC Introduced. Pt declined participation .       \" I need t

## 2021-11-08 NOTE — CDS QUERY
How to Answer this Query    1.) Click \"Edit Button\" on the toolbar  2.) Type an \"X\" in the bracket for the diagnosis that applies. (You may also add additional clinical details as you feel necessary to substantiate your response).   3.) Finally click \" you have any questions, please contact Clinical :  Jorge Gomez RN CCDS at [phone]  . Thank You!      THIS FORM IS A PERMANENT PART OF THE MEDICAL RECORD

## 2021-11-08 NOTE — DISCHARGE SUMMARY
[facility]    Discharge Summary     Patient Status:  Inpatient    1940 MRN [id number]   [facility] 5SW/SE Attending Gerardo Soto, [address] [facility] Day # 6 PCP Gracia So MD     Date of Admission:  - hypernatremia has resolved w/ D5w  - encourage po intake  - renal US was obtained - no obstruction  - op f/u w/ Dr. Zully Woods      Encephalopathy  - likely 2/2 to above  - lithium level checked and ok  - cognitively appears to be baseline cognition; howev sodium 100 MG Caps  Commonly known as: COLACE  Take 100 mg by mouth 2 (two) times daily as needed for constipation. HYDROcodone-acetaminophen  MG Tabs  Commonly known as: NORCO  Take 2 tablets by mouth every 6 (six) hours as needed for Pain.   Not

## 2021-11-08 NOTE — PLAN OF CARE
No acute changes throughout the night. PRN norco for chronic right lower extremity pain. Safety precautions in place.     Problem: Patient Centered Care  Goal: Patient preferences are identified and integrated in the patient's plan of care  Description: Int MD/LIP if interventions unsuccessful or patient reports new pain  - Anticipate increased pain with activity and pre-medicate as appropriate  Outcome: Progressing     Problem: RISK FOR INFECTION - ADULT  Goal: Absence of fever/infection during anticipated n Problem: GENITOURINARY - ADULT  Goal: Absence of urinary retention  Description: INTERVENTIONS:  - Assess patient’s ability to void and empty bladder  - Monitor intake/output and perform bladder scan as needed  - Follow urinary retention protocol/standar

## 2021-11-08 NOTE — PROGRESS NOTES
[address] [facility] Infectious Disease  Progress Note    Jaxon Nieves Patient Status:  Inpatient    1940 MRN [id number]   Location [facility] 5SW/SE Attending Layla Loredo, [address] [facility] Day # 6 PCP Timur Deleon MD Generalized weakness with a h/o falling PTA  - No acute injuries or LOC     3. Bipolar disorder with cognitive decline  - Supportive care     4. Disposition - stable from ID.   Midline being placed and patient will be discharged on continued cefepime IV t

## 2021-11-08 NOTE — PROGRESS NOTES
[facility] Hospitalist Progress Note     CC: Hospital Follow up    PCP: Danita Hunt MD     Subjective:     - upset about having to have picc as she had a horrible experience with anesthesia, we discussed that there will be no Gen anesthesia ne 7. 2   .0 186.0 187.0         Recent Labs   Lab 11/06/21  0523 11/07/21  0622 11/07/21  0806 11/08/21  0529   GLU 87 89  --  92   BUN 19* 17  --  17   CREATSERUM 2.69* 2.69*  --  2.52*   GFRAA 18* 18*  --  20*   GFRNAA 16* 16*  --  17*   CA 9.2 9.0 obtained - no obstruction    Encephalopathy  - likely 2/2 to above  - lithium level checked and ok  - cognitively appears to be baseline cognition; however, remains anxious     Hypothyroidism  - continue synthroid     Bipolar DO   - Lithium resumed  - Cont

## 2021-11-09 NOTE — PROGRESS NOTES
Attempted x3  to give report to RN at [facility] in room 309 at [facility], unable to reach RN. Gave writer RNs number for callback to Jelani .   Will try to call back before shift ends.  midline in place at time discharge on right up

## 2021-11-09 NOTE — PLAN OF CARE
Problem: Patient Centered Care  Goal: Patient preferences are identified and integrated in the patient's plan of care  Description: Interventions:  - What would you like us to know as we care for you?  Im from [facility]  - Provide timely, complete, and ac INTERVENTIONS  - Monitor WBC  - Administer growth factors as ordered  - Implement neutropenic guidelines  Outcome: Adequate for Discharge     Problem: SAFETY ADULT - FALL  Goal: Free from fall injury  Description: INTERVENTIONS:  - Assess pt frequently for care  - Consider collaborating with pharmacy to review patient's medication profile  - Implement strategies to promote bladder emptying  Outcome: Adequate for Discharge     Problem: SKIN/TISSUE INTEGRITY - ADULT  Goal: Skin integrity remains intact  Jose Maria

## 2021-11-20 ENCOUNTER — HOSPITAL ENCOUNTER (EMERGENCY)
Facility: HOSPITAL | Age: 81
Discharge: HOME | End: 2021-11-20
Attending: EMERGENCY MEDICINE
Payer: MEDICARE

## 2021-11-20 ENCOUNTER — APPOINTMENT (OUTPATIENT)
Dept: GENERAL RADIOLOGY | Facility: HOSPITAL | Age: 81
End: 2021-11-20
Attending: EMERGENCY MEDICINE
Payer: MEDICARE

## 2021-11-20 VITALS
HEIGHT: 67 IN | TEMPERATURE: 99 F | BODY MASS INDEX: 36.1 KG/M2 | DIASTOLIC BLOOD PRESSURE: 84 MMHG | OXYGEN SATURATION: 100 % | SYSTOLIC BLOOD PRESSURE: 188 MMHG | RESPIRATION RATE: 20 BRPM | WEIGHT: 230 LBS | HEART RATE: 69 BPM

## 2021-11-20 DIAGNOSIS — R41.0 TRANSIENT CONFUSION: Primary | ICD-10-CM

## 2021-11-20 PROCEDURE — 87077 CULTURE AEROBIC IDENTIFY: CPT | Performed by: EMERGENCY MEDICINE

## 2021-11-20 PROCEDURE — 81001 URINALYSIS AUTO W/SCOPE: CPT | Performed by: EMERGENCY MEDICINE

## 2021-11-20 PROCEDURE — 85025 COMPLETE CBC W/AUTO DIFF WBC: CPT | Performed by: EMERGENCY MEDICINE

## 2021-11-20 PROCEDURE — 71045 X-RAY EXAM CHEST 1 VIEW: CPT | Performed by: EMERGENCY MEDICINE

## 2021-11-20 PROCEDURE — 80048 BASIC METABOLIC PNL TOTAL CA: CPT | Performed by: EMERGENCY MEDICINE

## 2021-11-20 PROCEDURE — 87186 SC STD MICRODIL/AGAR DIL: CPT | Performed by: EMERGENCY MEDICINE

## 2021-11-20 PROCEDURE — 99283 EMERGENCY DEPT VISIT LOW MDM: CPT

## 2021-11-20 PROCEDURE — 87086 URINE CULTURE/COLONY COUNT: CPT | Performed by: EMERGENCY MEDICINE

## 2021-11-20 PROCEDURE — 36415 COLL VENOUS BLD VENIPUNCTURE: CPT

## 2021-11-20 NOTE — ED PROVIDER NOTES
Patient Seen in: [facility] AND [facility] Emergency Department      History   Patient presents with:  Urinary Symptoms    Stated Complaint:     Subjective:   HPI    Patient 72-year-old female that was discharged from the hospital on November 8 after to admit fo reading glasses              Past Surgical History:   Procedure Laterality Date   • BENIGN BIOPSY LEFT      over 10 years ago   • CHOLECYSTECTOMY  2001   • COLONOSCOPY  2006    repeat 2011   • COLONOSCOPY     • COLONOSCOPY N/A 4/1/2019    Procedure: [place] Abdominal: Soft. Bowel sounds are normal. Exhibits no distension and no mass. There is no tenderness. There is no rebound and no guarding. Musculoskeletal: Normal range of motion. Exhibits no edema or tenderness.    Lymphadenopathy: No cervical adenopat (CPT=71045)    Result Date: 11/20/2021  CONCLUSION:  1. No acute findings.     Dictated by (CST): Irma Monterroso MD on 11/20/2021 at 3:25 PM     Finalized by (CST): Brina Sylvester MD on 11/20/2021 at 3:26 PM                   MDM      Work

## 2021-11-30 ENCOUNTER — HOSPITAL ENCOUNTER (EMERGENCY)
Facility: HOSPITAL | Age: 81
Discharge: HOME | End: 2021-11-30
Attending: EMERGENCY MEDICINE
Payer: MEDICARE

## 2021-11-30 VITALS
DIASTOLIC BLOOD PRESSURE: 70 MMHG | RESPIRATION RATE: 16 BRPM | SYSTOLIC BLOOD PRESSURE: 149 MMHG | BODY MASS INDEX: 36.1 KG/M2 | HEIGHT: 67 IN | WEIGHT: 230 LBS | OXYGEN SATURATION: 97 % | HEART RATE: 70 BPM | TEMPERATURE: 98 F

## 2021-11-30 DIAGNOSIS — R40.4 TRANSIENT ALTERATION OF AWARENESS: Primary | ICD-10-CM

## 2021-11-30 DIAGNOSIS — N30.00 ACUTE CYSTITIS WITHOUT HEMATURIA: ICD-10-CM

## 2021-11-30 PROCEDURE — 85025 COMPLETE CBC W/AUTO DIFF WBC: CPT | Performed by: EMERGENCY MEDICINE

## 2021-11-30 PROCEDURE — 81001 URINALYSIS AUTO W/SCOPE: CPT | Performed by: EMERGENCY MEDICINE

## 2021-11-30 PROCEDURE — 80178 ASSAY OF LITHIUM: CPT | Performed by: EMERGENCY MEDICINE

## 2021-11-30 PROCEDURE — 93010 ELECTROCARDIOGRAM REPORT: CPT | Performed by: EMERGENCY MEDICINE

## 2021-11-30 PROCEDURE — 87086 URINE CULTURE/COLONY COUNT: CPT | Performed by: EMERGENCY MEDICINE

## 2021-11-30 PROCEDURE — 36591 DRAW BLOOD OFF VENOUS DEVICE: CPT

## 2021-11-30 PROCEDURE — 93005 ELECTROCARDIOGRAM TRACING: CPT

## 2021-11-30 PROCEDURE — 99285 EMERGENCY DEPT VISIT HI MDM: CPT

## 2021-11-30 PROCEDURE — 87077 CULTURE AEROBIC IDENTIFY: CPT | Performed by: EMERGENCY MEDICINE

## 2021-11-30 PROCEDURE — 80048 BASIC METABOLIC PNL TOTAL CA: CPT | Performed by: EMERGENCY MEDICINE

## 2021-11-30 RX ORDER — CEFUROXIME AXETIL 500 MG/1
500 TABLET ORAL ONCE
Status: COMPLETED | OUTPATIENT
Start: 2021-11-30 | End: 2021-11-30

## 2021-11-30 RX ORDER — CEFUROXIME AXETIL 500 MG/1
250 TABLET ORAL 2 TIMES DAILY
Qty: 7 TABLET | Refills: 0 | Status: SHIPPED | OUTPATIENT
Start: 2021-11-30 | End: 2021-12-07

## 2021-11-30 RX ORDER — CEFUROXIME AXETIL 500 MG/1
250 TABLET ORAL 2 TIMES DAILY
Qty: 7 TABLET | Refills: 0 | Status: SHIPPED | OUTPATIENT
Start: 2021-11-30 | End: 2021-11-30

## 2021-11-30 NOTE — ED PROVIDER NOTES
Patient Seen in: [facility] AND [facility] Emergency Department      History   Patient presents with:  Altered Mental Status    Stated Complaint: AMS/UTI    Subjective:   HPI    80-year-old female presents for evaluation for some confusion.   Patient's brother [de-identified] HISTORY  01/2020    EGD                Social History    Tobacco Use      Smoking status: Never Smoker      Smokeless tobacco: Never Used    Vaping Use      Vaping Use: Never used    Alcohol use:  Yes      Alcohol/week: 1.0 standard drink      Types: 1 Glas pain and normal range of motion. Comments: PICC present in LUE. No induration, fluctuance, crepitus, erythema, tenderness or drainage. Skin:     General: Skin is warm and dry. Neurological:      General: No focal deficit present.       Mental Statu tests on the individual orders. LITHIUM (ESKALITH)   URINE CULTURE, ROUTINE     EKG    Rate, intervals and axes as noted on EKG Report. Rate: 68  Rhythm: Sinus Rhythm  Reading: no stemi, interpreted by myself.                         MDM    Patient's exa pressure. Medications Prescribed:  Current Discharge Medication List    START taking these medications    cefuroxime 500 MG Oral Tab  Take 0.5 tablets (250 mg total) by mouth 2 (two) times daily for 7 days.   Qty: 7 tablet Refills: 0

## 2021-11-30 NOTE — ED QUICK NOTES
Tamika Hair arrived with EMS from [facility] for concerns of transient confusion. Episodes of confusion witnessed by her brother who is now seated at bedside.  States Tamika Hair was talking with him on the phone \"trying to find the way back to her room at [facility]

## 2021-11-30 NOTE — ED PROVIDER NOTES
Signout taken with disposition pending lithium level in setting of UTI in otherwise AF/HDS, nontoxic patient without leukocytosis/bandemia with plan for discharge pending same. Lithium nontoxic, patient to be discharged as previously anticipated.     Recent Neutrophil Absolute Prelim 3.57 1.50 - 7.70 x10 (3) uL    Neutrophil Absolute 3.57 1.50 - 7.70 x10(3) uL    Lymphocyte Absolute 2.18 1.00 - 4.00 x10(3) uL    Monocyte Absolute 0.51 0.10 - 1.00 x10(3) uL    Eosinophil Absolute 0.63 0.00 - 0.70 x10(3) uL

## 2021-11-30 NOTE — CM/SW NOTE
Pt has been accepted to [facility] in [place]. Spoke to Juan Diego Waterman at [employer]. I also spoke to patient and brother (Kaya Gris) at the bedside and they are okay with going to [address] [place] [address]. I have sent a referral through [facility] as well.  Pt will be discharged wit

## 2021-11-30 NOTE — ED INITIAL ASSESSMENT (HPI)
Patient brought in by [facility] for evaluation of AMS. Patient was reportedly hallucinating at the nursing home last night night per the brother. Concerned for UTI.

## 2021-11-30 NOTE — CM/SW NOTE
Per Tomas Barba at [facility], patient was accepted at [facility] in [place] and was going to discharge there today. Updated Jillian Castellanos, to follow-up with [facility] regarding admission.

## 2021-12-01 NOTE — ED QUICK NOTES
Az Amador assisted into her clothing, clean Depends provided. Preparing for transport to [facility] via [employer]. Pt assisted into wheelchair. Verbal report called to VIOLETA Avalos.

## 2021-12-01 NOTE — ED QUICK NOTES
Ronnell Goodwin is resting without complaint at this time. Brother remains at bedside. Updated on status of [facility].

## 2023-08-25 NOTE — OCCUPATIONAL THERAPY NOTE
Chart reviewed, RN stated patient okay to see. OT attempted patient for treatment session. Patient received supine in bed, patient's brother present. Patient declined to participate in therapy session at this time despite encouragement.  Reported the plan i Cheek-To-Nose Interpolation Flap Text: A decision was made to reconstruct the defect utilizing an interpolation axial flap and a staged reconstruction.  A telfa template was made of the defect.  This telfa template was then used to outline the Cheek-To-Nose Interpolation flap.  The donor area for the pedicle flap was then injected with anesthesia.  The flap was excised through the skin and subcutaneous tissue down to the layer of the underlying musculature.  The interpolation flap was carefully excised within this deep plane to maintain its blood supply.  The edges of the donor site were undermined.   The donor site was closed in a primary fashion.  The pedicle was then rotated into position and sutured.  Once the tube was sutured into place, adequate blood supply was confirmed with blanching and refill.  The pedicle was then wrapped with xeroform gauze and dressed appropriately with a telfa and gauze bandage to ensure continued blood supply and protect the attached pedicle.

## 2024-07-08 NOTE — PROGRESS NOTES
[facility]    Progress Note    Jessica Aleman Patient Status:  Observation    1940 MRN [id number]   Location [facility] 5SW/SE Attending Sommer Saucedo MD   Hosp Day # 0 PCP Yaakov Armstrong MD       Subjective:   Jessica Aleman Per Francie Ashby, the patient is all set 07/22 he is out of town next week.   DDIMER 0.67 (H) 07/25/2017    ESRML 19 04/10/2018    CRP 0.15 04/10/2018    MG 2.2 03/27/2019    PHOS 3.4 03/28/2019    B12 1,847 (H) 03/26/2019                   Shayy Ram MD  3/28/2019

## (undated) DEVICE — Device: Brand: SPOT EX ENDOSCOPIC TATTOO

## (undated) DEVICE — Device: Brand: CUSTOM PROCEDURE KIT

## (undated) DEVICE — CONMED SCOPE SAVER BITE BLOCK, 20X27 MM: Brand: SCOPE SAVER

## (undated) DEVICE — 3 ML SYRINGE LUER-LOCK TIP: Brand: MONOJECT

## (undated) DEVICE — FORCEP RADIAL JAW 4

## (undated) DEVICE — NEEDLE CONTRAST INTERJECT 25G

## (undated) DEVICE — Device: Brand: DEFENDO AIR/WATER/SUCTION AND BIOPSY VALVE

## (undated) DEVICE — MEDI-VAC NON-CONDUCTIVE SUCTION TUBING 6MM X 1.8M (6FT.) L: Brand: CARDINAL HEALTH

## (undated) DEVICE — 6 ML SYRINGE LUER-LOCK TIP: Brand: MONOJECT

## (undated) DEVICE — LINE MNTR ADLT SET O2 INTMD

## (undated) DEVICE — SNARE OPTMZ PLPCTM TRP

## (undated) DEVICE — SNARE CAPTIFLEX MICRO-OVL OLY

## (undated) NOTE — IP AVS SNAPSHOT
[facility]            (For Outpatient Use Only) Initial Admit Date: 11/2/2021   Inpt/Obs Admit Date: Inpt: 11/2/21 / Obs: N/A   Discharge Date:    Leslee Vivar:  [de-identified]   MRN: [de-identified]   CSN: [id number]   CEID: [id number]        [facility] Payor:  Plan:    Group Number:  Insurance Type:    Subscriber Name:  Subscriber :    Subscriber ID:  Pt Rel to Subscriber:    Hospital Account Financial Class: Medicare    2021

## (undated) NOTE — IP AVS SNAPSHOT
Patient Demographics     Address  [address] [facility] [address] Phone  [phone] [facility])  [phone] (Mobile) *Preferred* E-mail Address  [email or web address]      Emergency Contact(s)     Name Relation Home Work Mobile    Kolby Montoya Take 650 mg by mouth daily. atorvastatin 10 MG Tabs  Commonly known as: LIPITOR  Next dose due: Tonight 9 pm      Take 1 tablet (10 mg total) by mouth nightly.    Bobbi Newsome MD         ayr saline nasal Gel  Next dose due: Anytime as Needed ** SITE UNKNOWN **     Order ID Medication Name Action Time Action Reason Comments    [id number] ALPRAZolam Deal Mace) tab 0.5 mg 02/13/21 2234 Given      [id number] Amitriptyline HCl (ELAVIL) tab 50 mg 02/13/21 2144 Given      [id number] HYDROcodone-acetaminop Components    Component Value Reference Range Flag Lab   Hold Lavender Auto Resulted — — [place] [facility])            BASIC METABOLIC PANEL (8) [[id number]] (Abnormal)  Resulted: 02/14/21 0743, Result status: Final result   Ordering provider: Lena Boyd Lithium 1.2 0.6 - 1.2 mmol/L — [place] [facility])            Testing Performed By     Lab - Abbreviation Name Director Address Valid Date Range    [address] [facility] ([facility]) Leeroy Feng Juldebbie January ANNA Reyes. 78  Mars Hi • B12 deficiency 2013   • Back problem    • Bipolar affective ([facility]    • Cataract     both eyes   • CKD (chronic kidney disease), stage 4 (severe)     sees nephrology   • Coronary atherosclerosis    • Depression    • Disorder of thyroid    • High cholester •  LEVOTHYROXINE SODIUM 75 MCG Oral Tab, TAKE ONE TABLET BY MOUTH ONE TIME DAILY , Disp: 90 tablet, Rfl: 2    •  Vitamin B-12 1000 MCG Oral Tab, Take 1,000 mcg by mouth daily. , Disp: , Rfl:     •  Ferrous Sulfate (IRON) 325 (65 Fe) MG Oral Tab, Take 1 tabl /58 (BP Location: Left arm)   Pulse 58   Temp 97.5 °F (36.4 °C) (Oral)   Resp 18   Ht 5' 7\" (1.702 m)   Wt 231 lb 11.2 oz (105.1 kg)   LMP  (LMP Unknown)   SpO2 98%   BMI 36.29 kg/m²   General:  Alert, no distress   Head:  Normocephalic, without obv - cre on admit 2.5  - did have diarrhea last week  - gentle IVF    Hypothyroidism  - TSH 7.7, free T4 0.8  - increase levothyroxine to 100mcg from 75    Bipolar DO / elevated lithium level  - Lithium 1.4  - Pysc consulted   - can adjust per renal      Johny HISTORY OF PRESENT ILLNESS:  The patient is an 29-year-old  single female with history of bipolar disorder on lithium for over 42 years, history of depression, CAD, hypertension, hypothyroidism and CKD stage 4, who presented to the hospital for in The patient, upon coming to the emergency room, found with creatinine of 2.52, albumin of 3.3, TSH of 8.58, hemoglobin 11.8, and lithium level of 1.4.   The patient herself today reporting feeling tired but reporting that she does not want to change lithium PAST PSYCHIATRIC HISTORY:  History of bipolar disorder with multiple previous admissions and a few admissions for lithium toxicity. The patient has been seen by Dr. Ney Cornejo. The patient has been taking Lithobid 300 mg daily.     SOCIAL HISTORY:  The jorden PLAN:  Discussed risks and benefits acknowledging the patient and the previous history and how the patient is attached to lithium, which is fairly appropriate, otherwise the patient has been demonstrating impairment in her kidneys function with impairment Bipolar disorder, unspecified ([facility]    Renal insufficiency    Metabolic acidosis    Acquired hypothyroidism    Elevated lithium level    Lithium intoxication, accidental or unintentional, initial encounter[TL.2]      PHYSICAL THERAPY ASSESSMENT   During OBJECTIVE[TL.1]  Precautions: Bed/chair alarm[TL.2]    WEIGHT BEARING RESTRICTION[TL.1]  Weight Bearing Restriction: None[TL.2]                PAIN ASSESSMENT[TL.1]   Ratin[TL.2]          BALANCE[TL.1] Goal #1 Patient is able to demonstrate supine - sit EOB @ level: modified independent      Goal #1   Current Status  Min A x 1 supine>sit.  Max A x 2 sit>supine   Goal #2 Patient is able to demonstrate transfers Sit to/from Stand at assistance level: superv Patient is a[ND.3 [de-identified]year old[ND.2] female admitted 2/10/2021 for weakness resulting in inability to walk; dehydration. Cleared for session by RN. PPE donned: gloves, mask, goggles, and gown. Pt greeted sitting in chair.  Pt's brother enters room at start PT Treatment Plan: Bed mobility; Body mechanics; Endurance; Energy conservation;Patient education;Gait training;Neuromuscular re-educate;Strengthening;Transfer training;Balance training  Rehab Potential : Guarded  Frequency (Obs): 3-5x/week[ND.2]       PHYSIC Performed by Herve Frye MD at [address] [place] [address]   • FOOT SURGERY Right    • GENICULAR NERVE BLOCK Right 1/8/2018    Performed by Rohan Hernandez MD at [address] [place] [address]   • LUCIA LOCALIZATION WIRE 1 SITE LEFT (CPT=19281)     • OTHER How much help from another person does the patient currently need. .. [ND.1]   -   Moving to and from a bed to a chair (including a wheelchair)?: A Lot   -   Need to walk in hospital room?: Total   -   Climbing 3-5 steps with a railing?: Total[ND.2]     AM-P Author: Trini Daigle PT Service: Rehab Author Type: Physical Therapist    Filed: 2/11/2021  3:37 PM Date of Service: 2/11/2021 10:15 AM Status: Signed    : Trini Daigle PT (Physical Therapist)    Related Notes: Addendum by Trini Daigle The patient's[ND.1] Approx Degree of Impairment: 76.75%[ND.2] has been calculated based on documentation in the [facility] '6 clicks' Inpatient Basic Mobility Short Form. Research supports that patients with this level of impairment may benefit from LTAC. PT re • OTHER DISEASES     renal insufficiency   • Pneumonia due to organism    • Renal disorder    • TIA (transient ischemic attack) 1/2013   • UTI (lower urinary tract infection)    • Visual impairment     reading glasses[ND.2]       Past Surgical History[ND. 1 Static Sitting: Not tested  Dynamic Sitting: Not tested  Static Standing: Poor -  Dynamic Standing: Not tested[ND.2]    ACTIVITY TOLERANCE  Pt exhibits self limiting behaviors. Appears anxious with mobility.      AM-PAC '6-Clicks' INPATIENT SHORT FORM - BAS Goal #3 Patient is able to ambulate 50 feet with assist device: walker - rolling at assistance level: supervision   Goal #3   Current Status    Goal #4    Goal #4   Current Status    Goal #5 Patient to demonstrate independence with home activity/exercise i Pt presents below prior baseline. Pt req Brittaney with bed mobility, dizzy once seated EOB (/72). Pt participating in seated ex's EOB with CGA; marching, ankle pumps, & reaching overhead. Req ModA x2 for STS t/f with RW.  Tolerating stand approx 45 second Elevated lithium level    Lithium intoxication, accidental or unintentional, initial encounter[RM.2]    Past Medical History[RM.1]  Past Medical History:   Diagnosis Date   • Anxiety state    • B12 deficiency 2013   • Back problem    • Bipolar affective Prior Level of [place]: PTA pt resides in ILF with 24 hr caregiver support, pt ambulatory with walker.  CG assisted with ADLs as needed    SUBJECTIVE  'I cant do this'     OCCUPATIONAL THERAPY EXAMINATION      OBJECTIVE[RM.1]  Precautions: Bed/chair al Patients self stated goal is: return home     Patient will complete functional transfer with Eduardo  Comment:     Patient will complete toileting with ModA  Comment:     Patient will tolerate standing for 2 minutes in prep for adls with Eduardo  Comment: - Follow medication plan  - monitor labs  - See additional Care Plan goals for specific interventions   Patient/Family Short Term Goal   (Resolved)     Interdisciplinary Completed    Description: Patient's Short Term Goal: To feel better    Interventions:

## (undated) NOTE — IP AVS SNAPSHOT
Patient Demographics     Address  [address] [facility] Phone  [phone] (Home) *Preferred* E-mail Address  [email or web address]      Emergency Contact(s)     Name Relation Home Work Mobile    ΛΕΜΕΣΟΣ Brothchris   [phone]      Al Next dose due:  01/30/2017      Take 1 tablet by mouth daily. Hina Mendez,          Cefuroxime Axetil 500 MG Tabs  Commonly known as:  CEFTIN  Next dose due:  01/30/2017      Take 1 tablet (500 mg total) by mouth daily.   Stop taking on:  1/31/2018   Janna Zaragoza [address] [facility], [phone] [address], [address] [facility] [address]    Phone:  [phone]   ferrous sulfate 325 (65 FE) MG Tbec  Miconazole Nitrate 2 % Powd     Please  your prescriptions at the location directed by your doctor or nurs [id number] docusate sodium (COLACE) cap 100 mg 01/28/18 2003 Given      [id number] docusate sodium (COLACE) cap 100 mg 01/29/18 0952 Given      [id number] ferrous sulfate EC tab 325 mg 01/29/18 7295 Given            LEFT LOWER ABDOMEN     Order ID Medication GFR, Non-African American 25 >=60 [facility]   GFR, African-American 30 >=60 [facility]   Comment:           Chronic Kidney Disease: GFR <60 ml/min/1.73 m2  Kidney failure: GFR <15 ml/min/1.73 m2    The accuracy of the MDRD equation is not suitable Blood Culture FREQ X 2 [[id number]] Collected:  01/24/18 1501    Order Status:  Completed Lab Status:  Final result Updated:  01/29/18 1600    Specimen:  Blood from Blood,peripheral      Blood Culture Result No Growth 5 Days    Respiratory Panel FLU expand occasional cough, no fevers or chills, no headaches or vision changes, no diarrhea or burning with urination, no chest pain or sob currently.   No other complaints.[TN.2]      PMH  Past Medical History:   Diagnosis Date   • B12 deficiency 2013   • CKD ( Lungs:[TN.1]   Slight wheezing in right lower lung, no crackles[TN.2]   Chest wall:  No tenderness or deformity. Heart:  Regular rate and rhythm, S1, S2 normal, no murmur, rub or gallop appreciated   Abdomen:   Soft, non-tender.  Bowel sounds normal. No m - hgb 9.2, prior 11  - check iron studies  - heme + in ED  - poss also component of ACD given CKD  - trend h/h    Nasal Congestion  - present for several weeks  - seen by ENT as outpatient  - started on nasonex as outaptient  - continue nasonex, add zyrtec Date of Admission:  1/24/2018  Date of Consult:  1/26/2018  Reason for Consultation:   Patient with history of bipolar disorder indicated for psych consult to clear for rehab. Dr. Erika Silva MD requests psychiatric consult for evaluation and advice.     Co No date: HYPERTENSION  No date: HYPOTHYROIDISM  No date: OSTEOPENIA  No date: OTHER DISEASES      Comment: bipolar  No date: OTHER DISEASES      Comment: renal insufficiency  1/2013: TIA (transient ischemic attack)  No date: UTI (lower urinary tract infect Q4H PRN   Levothyroxine Sodium (SYNTHROID, LEVOTHROID) tab 75 mcg 75 mcg Oral Daily   Mometasone Furoate (NASONEX) nasal spray 1 spray 1 spray Each Nare Daily   [place] Carbonate ER (LITHOBID) CR tab 300 mg 300 mg Oral Daily   Amitriptyline HCl (ELAVIL) ta T4F 0.9 04/25/2017   TSH 1.728 11/29/2017   DDIMER 0.67 (H) 07/25/2017   MG 2.3 01/26/2018   PHOS 4.1 11/29/2017   B12 131 (L) 01/25/2018         Imaging:  Xr Chest Ap Portable  (cpt=71045)    Result Date: 1/24/2018  CONCLUSION: Right hilar and right lower CBC With Differential With Platelet      Magnesium      Basic Metabolic Panel (8)      CBC With Differential With Platelet      Magnesium      Blood Culture FREQ X 2      Respiratory Panel FLU expanded Once    Meds This Visit:    No prescriptions reque cough, no fevers or chills, no headaches or vision changes, no diarrhea or burning with urination, no chest pain or sob currently. No other complaints.       Hospital Course:    Right lower lobe PNA / generalized weakness  - infiltrate noted on chest xray, EXTREMITIES: no edema, no calf tenderness    Operative Procedures:   Radiology:         Discharge Instructions     Medication List      START taking these medications    acetaminophen 325 MG Tabs  Commonly known as:  TYLENOL  Take 2 tablets (650 mg total) Commonly known as:  SYNTHROID, LEVOTHROID  TAKE ONE TABLET BY MOUTH ONE TIME DAILY     Mometasone Furoate 50 MCG/ACT Susp  Commonly known as:  NASONEX  SPRAY 2 PUFFS IN EACH NOSTRIL EVERY DAY        STOP taking these medications    aspirin 81 MG Tabs     s Answering Service number: [phone]  1/29/2018[LA.1]    Electronically signed by Bryant Paris DO on 1/29/2018  3:41 PM   Attribution Key    LA. 1 - Bryant Paris DO on 1/29/2018  3:37 PM                        Physical Therapy Notes (last 72 hours) (Notes repeated cueing and reassurance provided that pt is safe with therapist.  Pt demonstrated knock knee on RLE, smaller step length and shuffling steps to noted. Pt needed cueing to improve her step length and to increase DARBY.   Educated patient regarding bene -   Turning over in bed (including adjusting bedclothes, sheets and blankets)?: A Little   -   Sitting down on and standing up from a chair with arms (e.g., wheelchair, bedside commode, etc.): A Little   -   Moving from lying on back to sitting on the side ST.1 Beto Angel, PT on 1/29/2018 10:43 AM                        Occupational Therapy Notes (last 72 hours) (Notes from 1/26/2018  4:55 PM through 1/29/2018  4:55 PM)      Occupational Therapy Note signed by Stephane Beach OT at 1/29/2018 12:32 PM new learning and demonstrated fair carryover of new tasks and is only self limiting when it is an activity she does not feel confident in completing -     DISCHARGE RECOMMENDATIONS  OT Discharge Recommendations: Sub-acute rehabilitation  OT Device Recommen depends   Upper Extremity Dressing: set up   Lower Extremity Dressing: set up of items - MOD I     Education Provided: Role of OT, continue plan of care - body mechanics and energy conservation   Patient End of Session: Up in chair;Call light within reach;

## (undated) NOTE — IP AVS SNAPSHOT
Patient Demographics     Address  [address] [facility] [address] Phone  [phone] [facility])  [phone] (Mobile) *Preferred* E-mail Address  [email or web address]      Emergency Contact(s)     Name Relation Home Work Mobile    Kolby Montoya Commonly known as: KEFLEX      Take 1 capsule (250 mg total) by mouth every evening. Redia Close, DO         Ciprofloxacin HCl 500 MG Tabs  Commonly known as: CIPRO  Start taking on: April 1, 2021  Next dose due:  Tomorrow 9 am      Take 1 tablet (5 1423 Given      [id number] HYDROcodone-acetaminophen (NORCO)  MG per tab 2 tablet 03/30/21 2009 Given      [id number] HYDROcodone-acetaminophen (NORCO)  MG per tab 2 tablet 03/31/21 0541 Given      [id number] Levothyroxine Sodium tab 125 mcg 03/31 Lab ([facility])   CO2 18.0 21.0 - 32.0 mmol/L [facility] ([facility])   Anion Gap 7 0 - 18 mmol/L — [facility] ([facility])   BUN 14 7 - 18 mg/dL [facility])   Creatinine 2.23 0.55 - 1.02 mg/dL H [facility])   BUN/CREA Ratio 6.3 10.0 - 20.0 L [place] 03/30/21 1256          Components    Component Value Reference Range Flag Lab   Glucose 121 70 - 99 mg/dL H [facility])   Sodium 146 136 - 145 mmol/L [facility] ([facility])   Potassium 3.9 3.5 - 5.1 mmol/L — [facility])   Chloride 120 98 - 112 result Updated: 03/28/21 1302    Specimen: Other from Nares      Rapid SARS-CoV-2 by PCR Not Detected         H&P - H&P Note      H&P signed by Sommer Saucedo MD at 3/28/2021  3:50 PM  Version 1 of 1    Author: Sommer Saucedo MD Service: Hospitalist Author Type: P infection)    • Visual impairment     reading glasses        PSH  Past Surgical History:   Procedure Laterality Date   • BENIGN BIOPSY LEFT      over 10 years ago   • CHOLECYSTECTOMY  2001   • COLONOSCOPY  2006    repeat 2011   • COLONOSCOPY     • Viviana Garcia Rfl:   aspirin 81 MG Oral Tab, Take 650 mg by mouth daily. , Disp: , Rfl:   docusate sodium 100 MG Oral Cap, Take 100 mg by mouth 2 (two) times daily as needed for constipation. , Disp: 60 capsule, Rfl: 0  Amitriptyline HCl 50 MG Oral Tab, Take 1 tablet (5 BUN 19*   CREATSERUM 2.43*   *   CA 8.9       No results for input(s): ALT, AST, ALB, AMYLASE, LIPASE, LDH in the last 168 hours. Invalid input(s): ALPHOS, TBIL, DBIL, TPROT    No results for input(s): TROP in the last 168 hours.          Radiol MD on 3/28/2021  3:44 PM                        Consults - MD Consult Notes      Consults signed by Michi Shepard MD at 3/29/2021  6:48 PM     Author:  Michi Shepard MD Service: Psychiatry Author Type: Physician    Filed: 3/29/2021  6:48 PM Date of Lithium level on 3/28/2021 is 1.1. Visiting the patient today in the presence of her brother. Brother faced me with verbal attack claiming time playing with her lithium and they should not do anything without his consent.   Brother confronted about his Pneumonia due to organism    • Renal disorder    • TIA (transient ischemic attack) 1/2013   • UTI (lower urinary tract infection)    • Visual impairment     reading glasses       Past Surgical History  Past Surgical History:   Procedure Laterality Date   • Heparin Sodium (Porcine) 5000 UNIT/ML injection 5,000 Units, 5,000 Units, Subcutaneous, 2 times per day  acetaminophen (TYLENOL) tab 650 mg, 650 mg, Oral, Q6H PRN  PEG 3350 (MIRALAX) powder packet 17 g, 17 g, Oral, Daily PRN  bisacodyl (DULCOLAX) rectal s 03/29/2021    .0 03/29/2021    CREATSERUM 2.14 (H) 03/29/2021    BUN 18 03/29/2021     03/29/2021    K 4.6 03/29/2021     (H) 03/29/2021    CO2 17.0 (L) 03/29/2021    GLU 84 03/29/2021    CA 8.7 03/29/2021    ALB 3.0 (L) 02/11/2021    AL Impression:        Bipolar disorder type I recent episode depression. Drug induced sialorrhea. CKD.   Weakness generalized  Urinary tract infection without hematuria, site unspecified      Discussed risk and benefit, acknowledging the current symptom and Therapist)        PHYSICAL THERAPY EVALUATION - INPATIENT     Room Number: 573/573-A  Evaluation Date: 3/29/2021  Type of Evaluation: Initial   Physician Order: PT Eval and Treat    Presenting Problem: weakness, dizziness, vomiting, UTI; had not been OOB i preparation for discharge. DISCHARGE RECOMMENDATIONS  PT Discharge Recommendations: Sub-acute rehabilitation    PLAN  PT Treatment Plan: Bed mobility;Transfer training;Gait training;Family education;Patient education; Energy conservation; Endurance; Coordi Renal disorder    • TIA (transient ischemic attack) 1/2013   • UTI (lower urinary tract infection)    • Visual impairment     reading glasses       Past Surgical History  Past Surgical History:   Procedure Laterality Date   • BENIGN BIOPSY LEFT      over 1 for assistance and decreased awareness of need for safety        BALANCE  Static Sitting: Fair +  Dynamic Sitting: Fair +  Static Standing: Poor +  Dynamic Standing: Not tested    ADDITIONAL TESTS                                    NEUROLOGICAL FINDINGS Patient is able to ambulate 100 feet with assist device: walker - rolling at assistance level: modified independent   Goal #3   Current Status    Goal #4 Patient to demonstrate independence with home activity/exercise instructions provided to patient in pr female admitted 3/28/2021 for weakness, not out of bed in 2 days; dizziness and vomiting. UTI.   In this OT evaluation patient presents with the following impairments: decreased activity tolerance and endurance, limited functional reach, decreased standing Mod A x 2 --additional time to come to upright position. Third attempt to stand , pt barely clearing bottom off of bed.  Offered to bring bedside chair over to patient, but pt strongly defers further mobility stating \"I can't , I can't\" and appearing anxi bipolar   • OTHER DISEASES     renal insufficiency   • Pneumonia due to organism    • Renal disorder    • TIA (transient ischemic attack) 1/2013   • UTI (lower urinary tract infection)    • Visual impairment     reading glasses       Past Surgical History regular lower body clothing?: A Lot  -   Bathing (including washing, rinsing, drying)?: A Lot  -   Toileting, which includes using toilet, bedpan or urinal? : Total  -   Putting on and taking off regular upper body clothing?: A Lot  -   Taking care of pers Therapist) filed at 3/29/2021 12:52 PM       OCCUPATIONAL THERAPY EVALUATION - INPATIENT     Room Number: 573/573-A  Evaluation Date: 3/29/2021  Type of Evaluation: Initial  Presenting Problem: dizziness and vomiting     Physician Order: IP Consult to [employer] his report, pt becomes upset. Pt has been supine in bed since Saturday (she did walk to the bathroom once with RW on Saturday). Pt completed ADLs and functional mobility with up to total A. Pt was motivated to get out of bed to pull up depends.  Pt inco deficiency 2013   • Back problem    • [facility]    • Cataract     both eyes   • CKD (chronic kidney disease), stage 4 (severe)     sees nephrology   • Coronary atherosclerosis    • Depression    • Disorder of thyroid    • High cholesterol Bed/chair alarm  Fall Risk: High fall risk    PAIN ASSESSMENT  Ratin          ACTIVITY TOLERANCE  Room air    COGNITION  Decreased insight into need for assist , safety     RANGE OF MOTION   Upper extremity ROM is within functional limits     STRENGTH 3-5x/wk[KH. 1]     Attribution Key    [address] [place] [address]. 1 - Joanna Kaufman OT on 3/29/2021 12:38 PM                     Video Swallow Study Notes    No notes of this type exist for this encounter. SLP Notes    No notes of this type exist for this encounter.

## (undated) NOTE — IP AVS SNAPSHOT
[facility]            (For Outpatient Use Only) Initial Admit Date: 3/28/2021   Inpt/Obs Admit Date: Inpt: N/A / Obs: 03/28/21   Discharge Date:    Martita Friend:  [de-identified]   MRN: [de-identified]   CSN: [id number]   CEID: [id number]        EN Insurance Type:    Subscriber Name:  Subscriber :    Subscriber ID:  Pt Rel to Subscriber:    Hospital Account Financial Class: Medicare    2021

## (undated) NOTE — LETTER
[address], [place]  Authorization for Invasive Procedures  1.  I hereby authorize Dr. Brunilda Zapien , my physician and whomever may be designated as the doctor's assistant, to perform the following operation and/or procedure:  Col performed for the purposes of advancing medicine, science, and/or education, provided my identity is not revealed. If the procedure has been videotaped, the physician/surgeon will obtain the original videotape.  The hospital will not be responsible for stor My signature below affirms that prior to the time of the procedure, I have explained to the patient and/or her legal representative, the risks and benefits involved in the proposed treatment and any reasonable alternative to the proposed treatment.  I have

## (undated) NOTE — MR AVS SNAPSHOT
[address] [facility]  [phone]               Thank you for choosing us for your health care visit with Vitaly Chavis MD.  We are glad to serve you and happy to provide you with this summar Loperamide HCl 2 MG Caps   Take 2 mg by mouth 4 (four) times daily as needed for Diarrhea.    Commonly known as:  IMODIUM           NASONEX 50 MCG/ACT Susp   Generic drug:  Mometasone Furoate   SPRAY 2 PUFFS IN EACH NOSTRIL EVERY DAY           sulfa

## (undated) NOTE — ED AVS SNAPSHOT
[place]   MRN: [id number]    Department:  [facility] Emergency Department   Date of Visit:  6/15/2018           Disclosure     Insurance plans vary and the physician(s) referred by the ER may not be covered by your plan.  Please contact within the next three months to obtain basic health screening including reassessment of your blood pressure.     IF THERE IS ANY CHANGE OR WORSENING OF YOUR CONDITION, CALL YOUR PRIMARY CARE PHYSICIAN AT ONCE OR RETURN IMMEDIATELY TO THE EMERGENCY DEPARTMEN

## (undated) NOTE — LETTER
[facility]  Administration of Anesthesia  1. I, Analy Holland, or _________________________________ acting on her behalf, (Patient) (Dependent/Representative) request to receive anesthesia for my pending procedure/operation/treatment. infections, high spinal block, spinal bleeding, seizure, cardiac arrest and death. 7. AWARENESS: I understand that it is possible (but unlikely) to have explicit memory of events from the operating room while under general anesthesia.   8. ELECTROCONVULSIV unconscious pt /Relationship    My signature below affirms that prior to the time of the procedure, I have explained to the patient and/or his/her guardian, the risks and benefits of undergoing anesthesia, as well as any reasonable alternatives.     _______

## (undated) NOTE — IP AVS SNAPSHOT
Patient Demographics     Address  [address] [place] [address] Phone  [phone] [facility])  [phone] (Mobile) *Preferred* E-mail Address  [email or web address]      Emergency Contact(s)     Name Relation Home Work Mobile    Kolby Montoya MD Jagruti         ayr saline nasal Gel  Next dose due: Anytime as Needed      1 Application by Nasal route as needed. Cefepime HCl 1 g in sodium chloride 100 mL      Inject 1 g into the vein daily.    Cecilia Neville DO         cyanocobalamine 10 [id number] dextrose 5% infusion 11/07/21 1815 New Bag      [id number] ferrous sulfate EC tab 325 mg 11/08/21 0935 Given      [id number] levothyroxine tab 125 mcg 11/07/21 2010 Given              Recent Vital Signs       Most Recent Value   Vitals 146/68 Filed [facility])   GFR, -American 20 >=60 L [facility])            CBC With Differential With Platelet [[id number]] (Abnormal)  Resulted: 11/08/21 0549, Result status: Final result   Ordering provider: Perri Mobley MD  11/07/21 1989 Resulting 2  Sensitive     Ciprofloxacin <=1  Sensitive     Gentamicin 8  Intermediate     Levofloxacin 1  Sensitive     Meropenem <=1  Sensitive     Piperacillin + Tazobactam <=4  Sensitive     Tobramycin 2  Sensitive                   Linear View • OSTEOPENIA    • OTHER DISEASES     bipolar   • OTHER DISEASES     renal insufficiency   • Pneumonia due to organism    • Renal disorder    • TIA (transient ischemic attack) 1/2013   • UTI (lower urinary tract infection)    • Visual impairment     readi effort   Chest wall:  No tenderness or deformity. Heart:  Regular rate and rhythm, S1, S2 normal    Abdomen:   Soft, non-tender. Bowel sounds normal. No masses,  No organomegaly.  Non distended   Extremities: Extremities normal, atraumatic, no cyanosis or amitriptyline     Anxiety  - prn xanax    FN:  - IVF:s/p bolus  - Diet: adv diet    DVT Prophy:scd, heparin  Lines: PIV    Dispo: pending clinical course    Outpatient records or previous hospital records reviewed.      Further recommendations pending patie cultures are isolating >100K pseudomonas. In March 2021 she had e.coli isolated.   She has now been started on IV zosyn and we are asked to see and assist.    History:  Past Medical History:   Diagnosis Date   • Anxiety state    • B12 deficiency 2013   • B HIVES    Medications:    Current Facility-Administered Medications:   •  Heparin Sodium (Porcine) 5000 UNIT/ML injection 5,000 Units, 5,000 Units, Subcutaneous, Q8H [address]  •  acetaminophen (TYLENOL) tab 650 mg, 650 mg, Oral, Q6H PRN  •  melatonin tab 3 mg, 3 disorder. Remainder of 12 point review of systems otherwise negative.     Vital signs in last 24 hours:  Patient Vitals for the past 24 hrs:   BP Temp Temp src Pulse Resp SpO2 Height Weight   11/03/21 0810 126/61 97.8 °F (36.6 °C) Oral 64 — 96 % — —   11 and Plan:    1.   Evolving sepsis with fevers, elevated LA, weakness, falls and  source most likely  - Patient with a h/o recurrent UTI issues and presented with dysuria and active urine sediment  - Urine cultures now isolating pseudomonas  - IV zosyn sta Occupational Therapy Notes (last 72 hours)      Occupational Therapy Note signed by Aleisha Vines at 11/8/2021  3:29 PM  Version 1 of 1    Author: Hari Hollins OT Service: — Author Type: Occupational Therapist    Filed: 11/8/2021  3:29 Goals        Problem: Patient/Family Goals    Goal Priority Disciplines Outcome Interventions   Patient/Family Long Term Goal     Interdisciplinary Progressing    Description: Patient's Long Term Goal: Get better    Interventions:  -   - See additional Car

## (undated) NOTE — ED AVS SNAPSHOT
Bobbi Christensen   MRN: [id number]    Department:  [facility] Emergency Department   Date of Visit:  9/30/2018           Disclosure     Insurance plans vary and the physician(s) referred by the ER may not be covered by your plan.  Please contact within the next three months to obtain basic health screening including reassessment of your blood pressure.     IF THERE IS ANY CHANGE OR WORSENING OF YOUR CONDITION, CALL YOUR PRIMARY CARE PHYSICIAN AT ONCE OR RETURN IMMEDIATELY TO THE EMERGENCY DEPARTMEN

## (undated) NOTE — IP AVS SNAPSHOT
[facility]            (For Outpatient Use Only) Initial Admit Date: 2/10/2021   Inpt/Obs Admit Date: Inpt: 2/10/21 / Obs: N/A   Discharge Date:    Weston Correa:  [de-identified]   MRN: [de-identified]   CSN: [id number]   CEID: [id number]        [facility] Group Number:  Insurance Type:    Subscriber Name:  Subscriber :    Subscriber ID:  Pt Rel to Subscriber:    Hospital Account Financial Class: Medicare    2021

## (undated) NOTE — IP AVS SNAPSHOT
[facility]            (For Outpatient Use Only) Initial Admit Date: 4/1/2021   Inpt/Obs Admit Date: Inpt: 4/1/21 / Obs: N/A   Discharge Date:    Jen Arredondo:  [de-identified]   MRN: [de-identified]   CSN: [id number]   CEID: [id number]        [facility] Number:  Insurance Type:    Subscriber Name:  Subscriber :    Subscriber ID:  Pt Rel to Subscriber:    Hospital Account Financial Class: Medicare    2021

## (undated) NOTE — IP AVS SNAPSHOT
Patient Demographics     Address  [address] [address] Phone  [phone] (Home) *Preferred*  [phone] [facility]) E-mail Address  [email or web address]      Emergency Contact(s)     Name Relation Home Work Mobile    Kolby Montoya Next dose due:  Anytime as needed      Take 100 mg by mouth 2 (two) times daily as needed for constipation. DO GLORIA Abarca  Next dose due:  4/3/19 morning      1 spray by Nasal route daily.  1 SPRAY IN EACH NARE          HYDROcodone-ace Pilocarpine HCl 5 MG Tabs           [phone] - MAR ACTION REPORT  (last 24 hrs)    ** SITE UNKNOWN **     Order ID Medication Name Action Time Action Reason Comments    [id number] ALPRAZolam Donniegiuliano Kumar) tab 0.5 mg 04/01/19 2230 Given      [id number] ALPRAZolam Temp  98.2 °F (36.8 °C) Filed at 04/02/2019 1420   SpO2  93 % Filed at 04/02/2019 1420      Patient's Most Recent Weight       Most Recent Value   Patient Weight  95.7 kg (211 lb)         Lab Results Last 24 Hours      RENAL FUNCTION PANEL [[id number]] (Abn Filed:  3/26/2019  4:55 PM Date of Service:  3/26/2019  3:49 PM Status:  Signed    :  Mamie Lin MD (Physician)         [address]    History & Physical (or consult)    Dorene Spencer Patient Status Problem Relation Age of Onset   • Breast Cancer Mother 61   • Breast Cancer Maternal Aunt       Social History:  Social History    Tobacco Use      Smoking status: Never Smoker      Smokeless tobacco: Never Used    Alcohol use: Yes      Comment: occ      D ESRML 19 04/10/2018    CRP 0.15 04/10/2018    MG 2.4 01/29/2018    PHOS 4.0 12/04/2018    B12 297 06/13/2018       Lab Data:  Reviewed in Epic  Recent Labs   Lab  03/26/19   1241   RBC  3.44*   HGB  10.8*   HCT  36.2   MCV  105.2*   MCH  31.4   MCHC  29.8 clinical course[AW.2]    Will continue to follow while hospitalized. Please contact me or the on call [facility] hospitalist at [phone] with any questions/concerns.     Jaz Grier MD[AW.1]    Electronically signed by Felix Higginbotham MD on 3/2 evaluation she was found to have a macrocytic anemia which has been chronic. A stool Hemoccult was positive. The patient states that her appetite over the past several months has diminished. She believes she has lost about 20 pounds of weight.   She de artificial saliva substitute (MOUTH KOTE) solution SOLN  Mouth/Throat PRN   ALPRAZolam (XANAX) tab 0.5 mg 0.5 mg Oral Nightly PRN   Amitriptyline HCl (ELAVIL) tab 50 mg 50 mg Oral Nightly   atorvastatin (LIPITOR) tab 10 mg 10 mg Oral Nightly   sulfamethoxa Ferrous Sulfate (IRON) 325 (65 Fe) MG Oral Tab Take 1 tablet by mouth daily. Disp:  Rfl:    aspirin 81 MG Oral Tab Take 81 mg by mouth daily.  Disp:  Rfl:    docusate sodium 100 MG Oral Cap Take 100 mg by mouth 2 (two) times daily as needed for constipati Stress: Not on file    Relationships      Social connections:        Talks on phone: Not on file        Gets together: Not on file        Attends [employer] service: Not on file        Active member of club or organization: Not on file        Attends me Recent Labs   Lab  03/29/19   0747  03/30/19   0634  03/31/19   0619   RBC  3.38*  3.21*  3.22*   HGB  10.4*  10.1*  10.0*   HCT  36.3  34.7*  34.3*   MCV  107.4*  108.1*  106.5*   MCH  30.8  31.5  31.1   MCHC  28.7*  29.1*  29.2*   RDW  12.3  12.2  12.2 % 7.0 7.3 5.0    Eosinophils %      % 5.9 6.4 7.8    Basophils %      % 0.3 0.3 0.2    Immature Granulocyte %      % 0.3 0.3 0.2      Component      Latest Ref Rng & Units 3/27/2019 3/26/2019 12/4/2018 6/13/2018                WBC      4.0 - 11.0 x10(3 26.0 - 34.0 pg 30.8 31.7 32.2 (H) 31.8   MCHC      31.0 - 37.0 g/dL 30.0 (L) 31.3 (L) 31.9 (L) 31.9 (L)   RDW      11.0 - 15.0 % 12.1 14.4 14.8 14.3   RDW-SD      35.1 - 46.3 fL 45.9      Prelim Neutrophil Abs      1.50 - 7.70 x10 (3) uL 3.95      Neut % 56 52 58    Lymphocytes %      % 30 35 30    Monocytes %      % 6 5 5    Eosinophils %      % 9 8 6    Basophils %      % 0 0 0    Immature Granulocyte %      %         Component      Latest Ref Rng & Units 1/24/2018 11/29/2017 6/8/2017 10/25/2016 1.00 - 4.00 x10(3) uL 2.4 2.0 2.1   Monocytes Absolute      0.10 - 1.00 x10(3) uL 0.5 0.4 0.5   Eosinophils Absolute      0.00 - 0.70 x10(3) uL 0.4 0.6 (H) 0.6 (H)   Basophils Absolute      0.00 - 0.20 x10(3) uL 0.0 0.0 0.0   Immature Granulocyte Absol optimally requires evaluation. We discussed that the positive result could be related to anorectal irritation, more diffuse mucosal inflammation, polyp, neoplasm or vascular malformation.   I have discussed proceeding with a colonoscopy and upper endoscopy Hospital Discharge Diagnoses: weakness  Disposition: SNF    Important follow up:  -PCP in [x] within 7 days [] within 14 days [] other    No follow-up provider specified.     Please refer to prior H&P by Dr. Otoniel Romo on 3/26/19    HPI:   Karen Cabezas 4. Suboptimal colonoscopic preparation  -  If upper tract symptoms or progressive anemia are noted in the future, the issue of an upper GI endoscopy can be revisited.  Aborted d/t anesthetic issues this admit       #neuro  -consult neurology due to lithium · how much to take  · how to take this  · when to take this  · additional instructions        CONTINUE taking these medications    Amitriptyline HCl 50 MG Tabs  Commonly known as:  ELAVIL  Take 1 tablet (50 mg total) by mouth nightly.      aspirin 81 MG Tab Electronically signed by Gary Sandoval MD on 4/2/2019  3:40 PM   Attribution Key    CY. 1 - Gary Sandoval MD on 4/2/2019  3:35 PM                        Physical Therapy Notes (last 72 hours) (Notes from 3/30/2019  5:10 PM through 4/2/2019  5: DISCHARGE RECOMMENDATIONS  PT Discharge Recommendations: Sub-acute rehabilitation     PLAN  PT Treatment Plan: Balance training;Transfer training;Gait training    SUBJECTIVE  cooperative    OBJECTIVE  Precautions: Bed/chair alarm    WEIGHT BEARING RESTRICT within reach; Needs met; All patient questions and concerns addressed;RN aware of session/findings    CURRENT GOALS     Goals to be met by: 2 wks  Patient Goal Patient's self-stated goal is: Return home   Goal #1 Patient is able to demonstrate supine - sit E :  Yoli Sifuentes PT (Physical Therapist)       Attempted to see patient for physical therapy session. Patient leaving to go to colonoscopy. Will attempt to see patient tomorrow for physical therapy session. RN aware and agreeable. [JG.1]      A PT Treatment Plan: Bed mobility; Body mechanics; Endurance; Patient education;Gait training;Strengthening;Transfer training;Balance training    SUBJECTIVE  Pt was agreeable to therapy session.      OBJECTIVE  Precautions: Bed/chair alarm    WEIGHT BEARING REST Patient Goal Patient's self-stated goal is: Return home   Goal #1 Patient is able to demonstrate supine - sit EOB @ level: supervision     Goal #1   Current Status NT   Goal #2 Patient is able to demonstrate transfers Sit to/from Stand at assistance level: :  Alanna Perez OT (Occupational Therapist)       Chart reviewed. Attempted OT treatment, however patient is transferring off of floor for EGD. Will follow up. [AO.1]     Attribution Ortiz    AO. 1 - Donna Lackey OT on 4/1/2019  2:30 PM

## (undated) NOTE — ED AVS SNAPSHOT
Emily Fitzgerald   MRN: [id number]    Department:  [facility] Emergency Department   Date of Visit:  8/27/2019           Disclosure     Insurance plans vary and the physician(s) referred by the ER may not be covered by your plan.  Please contact within the next three months to obtain basic health screening including reassessment of your blood pressure.     IF THERE IS ANY CHANGE OR WORSENING OF YOUR CONDITION, CALL YOUR PRIMARY CARE PHYSICIAN AT ONCE OR RETURN IMMEDIATELY TO THE EMERGENCY DEPARTMEN

## (undated) NOTE — ED AVS SNAPSHOT
Jordanaranza Ramoso   MRN: [id number]    Department:  [facility] Emergency Department   Date of Visit:  12/20/2018           Disclosure     Insurance plans vary and the physician(s) referred by the ER may not be covered by your plan.  Please contac within the next three months to obtain basic health screening including reassessment of your blood pressure.     IF THERE IS ANY CHANGE OR WORSENING OF YOUR CONDITION, CALL YOUR PRIMARY CARE PHYSICIAN AT ONCE OR RETURN IMMEDIATELY TO THE EMERGENCY DEPARTMEN

## (undated) NOTE — IP AVS SNAPSHOT
Patient Demographics     Address  [address] [place] [address] Phone  [phone] [facility])  [phone] (Mobile) *Preferred* E-mail Address  [email or web address]      Emergency Contact(s)     Name Relation Home Work Mobile    Kolby Montoya tablet (10 mg total) by mouth nightly. Zabrina Garcia MD         ayr saline nasal Gel  Next dose due: Anytime as Needed      1 Application by Nasal route as needed. Cefuroxime Axetil 500 MG Tabs  Commonly known as: CEFTIN  Next dose due:  Tonight (last 24 hrs)    ** SITE UNKNOWN **     Order ID Medication Name Action Time Action Reason Comments    [id number] ALPRAZolam Cristino Peak) tab 0.5 mg 04/04/21 2135 Given      [id number] Amitriptyline HCl (ELAVIL) tab 50 mg 04/04/21 2134 Given      [id number] HYDROc 0800, Result status: Final result   Ordering provider: Shannon Galdamez DO  04/05/21 0646 Resulting lab: [facility])    Specimen Information    Type Source Collected On   Blood — 04/05/21 0646          Components    [facility] MRSA Screen By PCR Negative    Rapid SARS-CoV-2 by PCR STAT [[id number]]  (Normal) Collected: 04/01/21 9543    Order Status: Completed Lab Status: Final result Updated: 04/01/21 1811    Specimen: Other from Nares      Rapid SARS-CoV-2 by PCR Not Detected Bipolar DO / elevated lithium level  - Lithium normal on admit  - Divya snyder consulted   - Continue current meds     Coronary artery disease  - continue asa, statin     Depression  - continue amitriptyline     Anxiety  - prn xanax    FEN  -no IVF, lytes i PMH[JH.1]  Past Medical History:   Diagnosis Date   • Anxiety state    • B12 deficiency 2013   • Back problem    • Bipolar affective ([facility] [address]    • Cataract     both eyes   • CKD (chronic kidney disease), stage 4 (severe)     sees nephrology   • Coronary a Yes      Alcohol/week: 1.0 standard drinks      Types: 1 Glasses of wine per week      Comment: occ[JH.2]         Fam Hx[JH.1]  Family History   Problem Relation Age of Onset   • Breast Cancer Mother 61   • Breast Cancer Maternal Aunt[JH.2]            Obje CONCLUSION:  1. No acute intracranial process. 2.  There are mild microvascular white matter ischemic changes, likely related to long-standing hypertension and/or diabetes. 3.  Chronic inflammation of the right sphenoid sinus.   Dictated by (CST): Edi Farmer 4/1/2021  8:18 PM Date of Service: 4/1/2021  8:05 PM Status: Signed    : Steward Kussmaul, MD (Physician)    Related Notes: Addendum by Steward Kussmaul, MD (Physician) filed at 4/1/2021  8:47 PM         [facility] Hospitalist H&P     CC:[JH.1] Patient pres clinical course.   [facility] hospitalist to continue to follow patient while in house    Patient and/or patient's family given opportunity to ask questions and note understanding and agreeing with therapeutic plan as outlined    Thank You,  Jose Paula MD  DM HYPOTHYROIDISM    • Incontinence    • Muscle weakness    • Osteoarthritis    • OSTEOPENIA    • OTHER DISEASES     bipolar   • OTHER DISEASES     renal insufficiency   • Pneumonia due to organism    • Renal disorder    • TIA (transient ischemic attack) 1/20 has the wrong year and intermittent nonsensical words.   Does follow commands   Neck Supple, no JVD  Pulm: Lungs clear, normal respiratory effort, No wheezing or crackles  CV: Heart with regular rate and rhythm, No murmurs, rubs, gallops  Abd: Abdomen soft, thrombosis.      Dictated by (CST): Jocelin Díaz MD on 4/01/2021 at 7:00 PM     Finalized by (CST): Jocelin Díaz MD on 4/01/2021 at 7:03 PM          CT ABDOMEN+PELVIS [facility] LONG TERM 2D RNDR(NO IV,NO ORAL)(CPT=74176)    Result Date: 4/1/2021  CONC Consult:  4/5/2021   Reason for Consultation:   HALLIE    History of Present Illness:   Patient is a 80year old female who was admitted to the hospital for Cellulitis of right leg.     Pt was recently admitted from 3/28-3/31 for weakness and Ecoli UTI and dis [facility]   • LUCIA LOCALIZATION WIRE 1 SITE LEFT (CPT=19281)     • OTHER SURGICAL HISTORY  2000    left breast bx benign   • OTHER SURGICAL HISTORY  01/2020    EGD       Family History  Family History   Problem Relation Age of Onset   • Ashia tablets by mouth every 6 (six) hours as needed for Pain. Daily and then as needed )  ALPRAZolam 0.5 MG Oral Tab, Take 1 tablet (0.5 mg total) by mouth 2 (two) times daily as needed.  May also take 1 or 2 tabs during the day prn  Levothyroxine Sodium 125 MCG regular rate and rhythm, no edema  Abd: Abdomen soft, nontender, nondistended, no organomegaly, bowel sounds present  Skin: no symptoms reported  Psych: alert and oriented        Results:     Laboratory Data:  Recent Labs   Lab 04/01/21  1621 04/02/21  054 PHYSICAL THERAPY EVALUATION - INPATIENT     Room Number: 553/553-A  Evaluation Date: 4/3/2021  Type of Evaluation: Initial   Physician Order: PT Eval and Treat    Presenting Problem: AMS, acute encephalopathy, RLE cellulitis, recurrent UTI  Reason for Ther which are below the patient's pre-admission status. The patient's Approx Degree of Impairment: 57.7% has been calculated based on documentation in the [facility] '6 clicks' Inpatient Basic Mobility Short Form.   Research supports that patients with this level of 1/2013   • UTI (lower urinary tract infection)    • Visual impairment     reading glasses     Past Surgical History  Past Surgical History:   Procedure Laterality Date   • BENIGN BIOPSY LEFT      over 10 years ago   • CHOLECYSTECTOMY  2001   • COLONOSCOPY Sitting: Fair +  Dynamic Sitting: Fair  Static Standing: Fair -  Dynamic Standing: Fair -    ACTIVITY TOLERANCE  Pre-activity, supine:  SPO2 98% on room air  HR 75 bpm  BP /76 mmHg  *mild dizziness seated EOB, resolved with 2 min rest    AM-PAC '6-C #2 Patient is able to demonstrate transfers Sit to/from Stand at assistance level: supervision with walker - rolling     Goal #2  Current Status    Goal #3 Patient is able to ambulate 100 feet with assist device: walker - rolling at assistance level: CGA 4/2/2021  2:06 PM                        Occupational Therapy Notes (last 72 hours) (Notes from 4/2/2021 12:34 PM through 4/5/2021 12:34 PM)      Occupational Therapy Note signed by Cris Chavez OT at 4/3/2021  4:59 PM  Version 1 of 1    Author: Cris Chavez not gotten out of bed in 5 days, however with pt's cooperation, she was able to assist very well physically. Pt made comfortable in bed after clean up and left with all needs met, call light within reach, bed alarm on. RN was updated.      In this OT evalua toxicity   • HOSPITALIZATIONS 2008    cardiac cath, stenting of OM   • HYPERLIPIDEMIA    • HYPERTENSION    • HYPOTHYROIDISM    • Incontinence    • Muscle weakness    • Osteoarthritis    • OSTEOPENIA    • OTHER DISEASES     bipolar   • OTHER DISEASES     re 75HR, BP /76    COGNITION  Following Commands:  follows one step commands with repetition  Safety Judgement:  decreased awareness of need for assistance and decreased awareness of need for safety    VISION  Unable to assess at this time due to cogni addressed; Alarm set; Family present    OT Goals  Patients self stated goal is: To be able to walk again      Patient will complete functional transfer with SBA with RW. Comment:     Patient will complete toileting with SBA.    Comment:     Patient will doreen 10/26/20     INFLUENZA 10/21/19     INFLUENZA 10/08/18     INFLUENZA 09/01/17     INFLUENZA 09/15/16     INFLUENZA 12/23/15     INFLUENZA 09/27/13     INFLUENZA 09/10/12     INFLUENZA 11/09/10     INFLUENZA 09/22/09     INFLUENZA 10/13/08     INFLUENZA 10/

## (undated) NOTE — IP AVS SNAPSHOT
[facility]            (For Outpatient Use Only) Initial Admit Date: 3/26/2019   Inpt/Obs Admit Date: Inpt: 3/28/19 / Obs: 03/26/19   Discharge Date:    Renetta Lambert:  [de-identified]   MRN: [de-identified]   CSN: [id number]        ENCOUNTER  Patient Subscriber ID:  Pt Rel to Subscriber:    Hospital Account Financial Class: Medicare    April 2, 2019

## (undated) NOTE — Clinical Note
Meliza Torres Md  [address] [place], [address]       01/24/2017        Patient: Marylene Emory   YOB: 1940   Date of Visit: 1/23/2017       Dear  Dr. Luma Cabrera MD,      Thank you for referring Marylenwilliam [place] to

## (undated) NOTE — MR AVS SNAPSHOT
[address] [facility]  [phone]               Thank you for choosing us for your health care visit with Vitaly Dover MD.  We are glad to serve you and happy to provide you with this summar Commonly known as:  NORCO           Levothyroxine Sodium 75 MCG Tabs   Take 1 tablet (75 mcg total) by mouth daily. Commonly known as:  SYNTHROID, LEVOTHROID           Lithium Carbonate  MG Tbcr   Take 450 mg by mouth daily.  1 tab in the morning